# Patient Record
Sex: MALE | Race: WHITE | NOT HISPANIC OR LATINO
[De-identification: names, ages, dates, MRNs, and addresses within clinical notes are randomized per-mention and may not be internally consistent; named-entity substitution may affect disease eponyms.]

---

## 2017-08-18 ENCOUNTER — RX RENEWAL (OUTPATIENT)
Age: 47
End: 2017-08-18

## 2017-09-02 ENCOUNTER — EMERGENCY (EMERGENCY)
Facility: HOSPITAL | Age: 47
LOS: 1 days | Discharge: HOME | End: 2017-09-02
Attending: EMERGENCY MEDICINE | Admitting: EMERGENCY MEDICINE
Payer: MEDICARE

## 2017-09-02 VITALS
HEART RATE: 70 BPM | WEIGHT: 205.03 LBS | DIASTOLIC BLOOD PRESSURE: 99 MMHG | RESPIRATION RATE: 16 BRPM | TEMPERATURE: 99 F | SYSTOLIC BLOOD PRESSURE: 162 MMHG | HEIGHT: 70 IN | OXYGEN SATURATION: 98 %

## 2017-09-02 VITALS
TEMPERATURE: 98 F | OXYGEN SATURATION: 98 % | RESPIRATION RATE: 16 BRPM | SYSTOLIC BLOOD PRESSURE: 162 MMHG | HEART RATE: 62 BPM | DIASTOLIC BLOOD PRESSURE: 97 MMHG

## 2017-09-02 DIAGNOSIS — Z98.1 ARTHRODESIS STATUS: Chronic | ICD-10-CM

## 2017-09-02 LAB
ALBUMIN SERPL ELPH-MCNC: 4 G/DL — SIGNIFICANT CHANGE UP (ref 3.3–5)
ALP SERPL-CCNC: 87 U/L — SIGNIFICANT CHANGE UP (ref 30–120)
ALT FLD-CCNC: 43 U/L DA — SIGNIFICANT CHANGE UP (ref 10–60)
AMPHET UR-MCNC: NEGATIVE — SIGNIFICANT CHANGE UP
ANION GAP SERPL CALC-SCNC: 12 MMOL/L — SIGNIFICANT CHANGE UP (ref 5–17)
APPEARANCE UR: CLEAR — SIGNIFICANT CHANGE UP
AST SERPL-CCNC: 41 U/L — HIGH (ref 10–40)
BACTERIA # UR AUTO: NEGATIVE — SIGNIFICANT CHANGE UP
BARBITURATES UR SCN-MCNC: NEGATIVE — SIGNIFICANT CHANGE UP
BASOPHILS # BLD AUTO: 0.2 K/UL — SIGNIFICANT CHANGE UP (ref 0–0.2)
BASOPHILS NFR BLD AUTO: 1.6 % — SIGNIFICANT CHANGE UP (ref 0–2)
BENZODIAZ UR-MCNC: NEGATIVE — SIGNIFICANT CHANGE UP
BILIRUB SERPL-MCNC: 1.2 MG/DL — SIGNIFICANT CHANGE UP (ref 0.2–1.2)
BILIRUB UR-MCNC: NEGATIVE — SIGNIFICANT CHANGE UP
BUN SERPL-MCNC: 16 MG/DL — SIGNIFICANT CHANGE UP (ref 7–23)
CALCIUM SERPL-MCNC: 9.1 MG/DL — SIGNIFICANT CHANGE UP (ref 8.4–10.5)
CHLORIDE SERPL-SCNC: 107 MMOL/L — SIGNIFICANT CHANGE UP (ref 96–108)
CK MB CFR SERPL CALC: 4.7 NG/ML — HIGH (ref 0–3.6)
CO2 SERPL-SCNC: 24 MMOL/L — SIGNIFICANT CHANGE UP (ref 22–31)
COCAINE METAB.OTHER UR-MCNC: NEGATIVE — SIGNIFICANT CHANGE UP
COLOR SPEC: YELLOW — SIGNIFICANT CHANGE UP
CREAT SERPL-MCNC: 0.94 MG/DL — SIGNIFICANT CHANGE UP (ref 0.5–1.3)
DIFF PNL FLD: ABNORMAL
EOSINOPHIL # BLD AUTO: 0.1 K/UL — SIGNIFICANT CHANGE UP (ref 0–0.5)
EOSINOPHIL NFR BLD AUTO: 0.9 % — SIGNIFICANT CHANGE UP (ref 0–6)
EPI CELLS # UR: SIGNIFICANT CHANGE UP
GLUCOSE SERPL-MCNC: 79 MG/DL — SIGNIFICANT CHANGE UP (ref 70–99)
GLUCOSE UR QL: NEGATIVE MG/DL — SIGNIFICANT CHANGE UP
HCT VFR BLD CALC: 45.6 % — SIGNIFICANT CHANGE UP (ref 39–50)
HGB BLD-MCNC: 14.4 G/DL — SIGNIFICANT CHANGE UP (ref 13–17)
KETONES UR-MCNC: ABNORMAL
LEUKOCYTE ESTERASE UR-ACNC: NEGATIVE — SIGNIFICANT CHANGE UP
LYMPHOCYTES # BLD AUTO: 2.1 K/UL — SIGNIFICANT CHANGE UP (ref 1–3.3)
LYMPHOCYTES # BLD AUTO: 22 % — SIGNIFICANT CHANGE UP (ref 13–44)
MCHC RBC-ENTMCNC: 28.9 PG — SIGNIFICANT CHANGE UP (ref 27–34)
MCHC RBC-ENTMCNC: 31.6 GM/DL — LOW (ref 32–36)
MCV RBC AUTO: 91.3 FL — SIGNIFICANT CHANGE UP (ref 80–100)
METHADONE UR-MCNC: NEGATIVE — SIGNIFICANT CHANGE UP
MONOCYTES # BLD AUTO: 0.8 K/UL — SIGNIFICANT CHANGE UP (ref 0–0.9)
MONOCYTES NFR BLD AUTO: 8.3 % — SIGNIFICANT CHANGE UP (ref 2–14)
NEUTROPHILS # BLD AUTO: 6.3 K/UL — SIGNIFICANT CHANGE UP (ref 1.8–7.4)
NEUTROPHILS NFR BLD AUTO: 67.1 % — SIGNIFICANT CHANGE UP (ref 43–77)
NITRITE UR-MCNC: NEGATIVE — SIGNIFICANT CHANGE UP
OPIATES UR-MCNC: NEGATIVE — SIGNIFICANT CHANGE UP
PCP SPEC-MCNC: SIGNIFICANT CHANGE UP
PCP UR-MCNC: NEGATIVE — SIGNIFICANT CHANGE UP
PH UR: 5 — SIGNIFICANT CHANGE UP (ref 5–8)
PLATELET # BLD AUTO: 268 K/UL — SIGNIFICANT CHANGE UP (ref 150–400)
POTASSIUM SERPL-MCNC: 3.7 MMOL/L — SIGNIFICANT CHANGE UP (ref 3.5–5.3)
POTASSIUM SERPL-SCNC: 3.7 MMOL/L — SIGNIFICANT CHANGE UP (ref 3.5–5.3)
PROT SERPL-MCNC: 7.4 G/DL — SIGNIFICANT CHANGE UP (ref 6–8.3)
PROT UR-MCNC: 15 MG/DL
RBC # BLD: 5 M/UL — SIGNIFICANT CHANGE UP (ref 4.2–5.8)
RBC # FLD: 12 % — SIGNIFICANT CHANGE UP (ref 10.3–14.5)
RBC CASTS # UR COMP ASSIST: SIGNIFICANT CHANGE UP /HPF (ref 0–4)
SODIUM SERPL-SCNC: 143 MMOL/L — SIGNIFICANT CHANGE UP (ref 135–145)
SP GR SPEC: 1.02 — SIGNIFICANT CHANGE UP (ref 1.01–1.02)
THC UR QL: POSITIVE
TROPONIN I SERPL-MCNC: 0 NG/ML — LOW (ref 0.02–0.06)
UROBILINOGEN FLD QL: NEGATIVE MG/DL — SIGNIFICANT CHANGE UP
WBC # BLD: 9.3 K/UL — SIGNIFICANT CHANGE UP (ref 3.8–10.5)
WBC # FLD AUTO: 9.3 K/UL — SIGNIFICANT CHANGE UP (ref 3.8–10.5)
WBC UR QL: SIGNIFICANT CHANGE UP

## 2017-09-02 PROCEDURE — 80053 COMPREHEN METABOLIC PANEL: CPT

## 2017-09-02 PROCEDURE — 99284 EMERGENCY DEPT VISIT MOD MDM: CPT | Mod: 25

## 2017-09-02 PROCEDURE — 84484 ASSAY OF TROPONIN QUANT: CPT

## 2017-09-02 PROCEDURE — 70450 CT HEAD/BRAIN W/O DYE: CPT

## 2017-09-02 PROCEDURE — 82553 CREATINE MB FRACTION: CPT

## 2017-09-02 PROCEDURE — 70450 CT HEAD/BRAIN W/O DYE: CPT | Mod: 26

## 2017-09-02 PROCEDURE — 71010: CPT | Mod: 26

## 2017-09-02 PROCEDURE — 80307 DRUG TEST PRSMV CHEM ANLYZR: CPT

## 2017-09-02 PROCEDURE — 71045 X-RAY EXAM CHEST 1 VIEW: CPT

## 2017-09-02 PROCEDURE — 99285 EMERGENCY DEPT VISIT HI MDM: CPT

## 2017-09-02 PROCEDURE — 85027 COMPLETE CBC AUTOMATED: CPT

## 2017-09-02 PROCEDURE — 93005 ELECTROCARDIOGRAM TRACING: CPT

## 2017-09-02 PROCEDURE — 81001 URINALYSIS AUTO W/SCOPE: CPT

## 2017-09-02 PROCEDURE — 93010 ELECTROCARDIOGRAM REPORT: CPT

## 2017-09-02 RX ORDER — PANTOPRAZOLE SODIUM 20 MG/1
40 TABLET, DELAYED RELEASE ORAL
Qty: 0 | Refills: 0 | Status: DISCONTINUED | OUTPATIENT
Start: 2017-09-02 | End: 2017-09-06

## 2017-09-02 RX ORDER — SODIUM CHLORIDE 9 MG/ML
3 INJECTION INTRAMUSCULAR; INTRAVENOUS; SUBCUTANEOUS ONCE
Qty: 0 | Refills: 0 | Status: COMPLETED | OUTPATIENT
Start: 2017-09-02 | End: 2017-09-02

## 2017-09-02 RX ADMIN — Medication 30 MILLILITER(S): at 20:03

## 2017-09-02 RX ADMIN — PANTOPRAZOLE SODIUM 40 MILLIGRAM(S): 20 TABLET, DELAYED RELEASE ORAL at 20:03

## 2017-09-02 RX ADMIN — Medication 0.2 MILLIGRAM(S): at 18:01

## 2017-09-02 RX ADMIN — SODIUM CHLORIDE 3 MILLILITER(S): 9 INJECTION INTRAMUSCULAR; INTRAVENOUS; SUBCUTANEOUS at 18:00

## 2017-09-02 NOTE — ED PROVIDER NOTE - PMH
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

## 2017-09-02 NOTE — ED ADULT TRIAGE NOTE - CHIEF COMPLAINT QUOTE
46 yr. old male with fever x 6 days.  Today c/o SOB, and dizziness. 46 yr. old male with fever x 6 days.  Today c/o difficulty breathing, nausea, back pain and dizziness.

## 2017-09-02 NOTE — ED PROVIDER NOTE - OBJECTIVE STATEMENT
47 yo male with hx of HTN on no meds, co 6 days of feeling sick, headache, unsteady, feverish. Yesterday started taking cold medicine thinking he was getting sick with a cold. Today feels like BP is high. Has BP med at home but doesn't take it. Denies CP, SOB, N, V, abd pain or other symptom. 47 yo male with hx of HTN on no meds, co 6 days of feeling sick, headache, unsteady, feverish. Yesterday started taking cold medicine thinking he was getting sick with a cold. Admits to taking falguni seltzer cold and tylenol cold at the same time. Today feels like BP is high and feels anxious. Has BP med at home but doesn't take it. Denies CP, SOB, N, V, abd pain or other symptom.

## 2017-09-02 NOTE — ED ADULT NURSE NOTE - OBJECTIVE STATEMENT
Pt presents with complaint of nausea and vomiting with sharp  constant abdominal pain for 6 days. States chronic back problem is increased as well, No vomiting noted. Extremely anxious. Rapid speech.poor concentration. Abdomen soft Bilateral upper quadrant abdominal tenderness ed. Positive bowel sounds noted

## 2017-09-02 NOTE — ED PROVIDER NOTE - PROGRESS NOTE DETAILS
BP improved. Feels somewhat better. Results of all tests discussed with pt and family. Will avoid decongestants and FU with his PMD on tuesday for repeat BP check.

## 2017-09-02 NOTE — ED ADULT NURSE NOTE - PMH
Ankle Fracture  left  Herniated Disc  with nerve damage  Obesity    Old Disrupted ACL (Anterior Cruciate Ligament)  left Ankle Fracture  left  Herniated Disc  with nerve damage  Hypothyroid    Obesity    Old Disrupted ACL (Anterior Cruciate Ligament)  left

## 2017-09-02 NOTE — ED ADULT NURSE NOTE - CHPI ED SYMPTOMS NEG
no chills/no blood in stool/no fever/no burning urination/no vomiting/no diarrhea/no dysuria/no abdominal distension/no hematuria

## 2017-09-02 NOTE — ED PROVIDER NOTE - MEDICAL DECISION MAKING DETAILS
47 yo male with vague symptoms for 6 days has had htn but doesn't take meds, taking cold medicine since yesterday now with elevated BP. PE otherwise normal. Plan - BP med, labs, ekg, ct brain. Reassess.

## 2017-09-02 NOTE — ED ADULT NURSE NOTE - CHIEF COMPLAINT QUOTE
46 yr. old male with fever x 6 days.  Today c/o difficulty breathing, nausea, back pain and dizziness.

## 2017-09-05 ENCOUNTER — APPOINTMENT (OUTPATIENT)
Dept: FAMILY MEDICINE | Facility: CLINIC | Age: 47
End: 2017-09-05
Payer: MEDICARE

## 2017-09-05 VITALS
BODY MASS INDEX: 29.35 KG/M2 | OXYGEN SATURATION: 98 % | RESPIRATION RATE: 15 BRPM | WEIGHT: 205 LBS | HEIGHT: 70 IN | SYSTOLIC BLOOD PRESSURE: 150 MMHG | HEART RATE: 74 BPM | DIASTOLIC BLOOD PRESSURE: 100 MMHG | TEMPERATURE: 98.4 F

## 2017-09-05 PROCEDURE — 99214 OFFICE O/P EST MOD 30 MIN: CPT

## 2017-10-10 ENCOUNTER — RX RENEWAL (OUTPATIENT)
Age: 47
End: 2017-10-10

## 2018-02-20 ENCOUNTER — RX RENEWAL (OUTPATIENT)
Age: 48
End: 2018-02-20

## 2018-03-26 ENCOUNTER — RX RENEWAL (OUTPATIENT)
Age: 48
End: 2018-03-26

## 2018-04-25 ENCOUNTER — RX RENEWAL (OUTPATIENT)
Age: 48
End: 2018-04-25

## 2018-05-25 ENCOUNTER — RX RENEWAL (OUTPATIENT)
Age: 48
End: 2018-05-25

## 2018-06-26 ENCOUNTER — RX RENEWAL (OUTPATIENT)
Age: 48
End: 2018-06-26

## 2018-07-30 ENCOUNTER — RX RENEWAL (OUTPATIENT)
Age: 48
End: 2018-07-30

## 2018-08-27 ENCOUNTER — RX RENEWAL (OUTPATIENT)
Age: 48
End: 2018-08-27

## 2018-09-03 ENCOUNTER — FORM ENCOUNTER (OUTPATIENT)
Age: 48
End: 2018-09-03

## 2018-09-04 ENCOUNTER — APPOINTMENT (OUTPATIENT)
Age: 48
End: 2018-09-04
Payer: MEDICARE

## 2018-09-04 ENCOUNTER — OUTPATIENT (OUTPATIENT)
Dept: OUTPATIENT SERVICES | Facility: HOSPITAL | Age: 48
LOS: 1 days | End: 2018-09-04
Payer: MEDICARE

## 2018-09-04 ENCOUNTER — APPOINTMENT (OUTPATIENT)
Dept: RADIOLOGY | Facility: HOSPITAL | Age: 48
End: 2018-09-04
Payer: MEDICARE

## 2018-09-04 VITALS
TEMPERATURE: 98.2 F | OXYGEN SATURATION: 98 % | HEART RATE: 61 BPM | BODY MASS INDEX: 31.21 KG/M2 | WEIGHT: 218 LBS | HEIGHT: 70 IN | DIASTOLIC BLOOD PRESSURE: 94 MMHG | SYSTOLIC BLOOD PRESSURE: 152 MMHG

## 2018-09-04 DIAGNOSIS — Z98.1 ARTHRODESIS STATUS: Chronic | ICD-10-CM

## 2018-09-04 DIAGNOSIS — R53.82 CHRONIC FATIGUE, UNSPECIFIED: ICD-10-CM

## 2018-09-04 PROBLEM — E03.9 HYPOTHYROIDISM, UNSPECIFIED: Chronic | Status: ACTIVE | Noted: 2017-09-02

## 2018-09-04 PROCEDURE — 93000 ELECTROCARDIOGRAM COMPLETE: CPT | Mod: 59

## 2018-09-04 PROCEDURE — 99214 OFFICE O/P EST MOD 30 MIN: CPT | Mod: 25

## 2018-09-04 PROCEDURE — 71046 X-RAY EXAM CHEST 2 VIEWS: CPT

## 2018-09-04 PROCEDURE — 71046 X-RAY EXAM CHEST 2 VIEWS: CPT | Mod: 26

## 2018-09-04 NOTE — PLAN
[FreeTextEntry1] : labs ordered. \par EKG sinus bradycardia.\par cardiology referral.\par daily weight, avoid salt. \par monitor BP.\par meds refilled.\par f/u within a month

## 2018-09-04 NOTE — HISTORY OF PRESENT ILLNESS
[FreeTextEntry8] :  C/o weight gain and feet swelling. He ran out of his meds-synthroid, metoprolol which he did not take. no chest pain , sob , fever, chills. He had right shoulder arthroscopic surgery 7 weeks ago. c/o right shoulder pain . He is taking advil  for pain 4 pills every 8 hours. He gained 15 pound in 3 week. c/o fatigue and sob for 5 weeks. denies chest pain , nausea, vomiting.

## 2018-09-04 NOTE — COUNSELING
[Healthy eating counseling provided] : healthy eating [Activity counseling provided] : activity [Behavioral health counseling provided] : behavioral health  [Walking] : Walking [Engage in a relaxing activity] : Engage in a relaxing activity [None] : None [Needs reinforcement, provided] : Patient needs reinforcement on understanding lifestyle changes and  the steps needed to achieve self management goals and reinforcement was provided

## 2018-09-04 NOTE — HEALTH RISK ASSESSMENT
[No falls in past year] : Patient reported no falls in the past year [0] : 2) Feeling down, depressed, or hopeless: Not at all (0) [] : No [de-identified] : orthopedic  [[id number]] : 0

## 2018-09-07 LAB
25(OH)D3 SERPL-MCNC: 26.4 NG/ML
ALBUMIN SERPL ELPH-MCNC: 4.2 G/DL
ALP BLD-CCNC: 77 U/L
ALT SERPL-CCNC: 22 U/L
ANION GAP SERPL CALC-SCNC: 12 MMOL/L
APPEARANCE: CLEAR
AST SERPL-CCNC: 25 U/L
BACTERIA: NEGATIVE
BASOPHILS # BLD AUTO: 0.04 K/UL
BASOPHILS NFR BLD AUTO: 0.5 %
BILIRUB SERPL-MCNC: 0.5 MG/DL
BILIRUBIN URINE: NEGATIVE
BLOOD URINE: NEGATIVE
BUN SERPL-MCNC: 16 MG/DL
CALCIUM SERPL-MCNC: 9.3 MG/DL
CHLORIDE SERPL-SCNC: 108 MMOL/L
CHOLEST SERPL-MCNC: 164 MG/DL
CHOLEST/HDLC SERPL: 2.8 RATIO
CO2 SERPL-SCNC: 26 MMOL/L
COLOR: YELLOW
CREAT SERPL-MCNC: 0.73 MG/DL
CREAT SPEC-SCNC: 282 MG/DL
EOSINOPHIL # BLD AUTO: 0.14 K/UL
EOSINOPHIL NFR BLD AUTO: 1.9 %
GLUCOSE QUALITATIVE U: NEGATIVE MG/DL
GLUCOSE SERPL-MCNC: 90 MG/DL
HBA1C MFR BLD HPLC: 5.5 %
HCT VFR BLD CALC: 42.6 %
HCV AB SER QL: NONREACTIVE
HCV S/CO RATIO: 0.18 S/CO
HDLC SERPL-MCNC: 59 MG/DL
HGB BLD-MCNC: 13.8 G/DL
HIV1+2 AB SPEC QL IA.RAPID: NONREACTIVE
HYALINE CASTS: 1 /LPF
IMM GRANULOCYTES NFR BLD AUTO: 0.3 %
KETONES URINE: NEGATIVE
LDLC SERPL CALC-MCNC: 88 MG/DL
LEUKOCYTE ESTERASE URINE: NEGATIVE
LYMPHOCYTES # BLD AUTO: 2.1 K/UL
LYMPHOCYTES NFR BLD AUTO: 28.5 %
MAN DIFF?: NORMAL
MCHC RBC-ENTMCNC: 29.9 PG
MCHC RBC-ENTMCNC: 32.4 GM/DL
MCV RBC AUTO: 92.4 FL
MICROALBUMIN 24H UR DL<=1MG/L-MCNC: 1.4 MG/DL
MICROALBUMIN/CREAT 24H UR-RTO: 5 MG/G
MICROSCOPIC-UA: NORMAL
MONOCYTES # BLD AUTO: 0.58 K/UL
MONOCYTES NFR BLD AUTO: 7.9 %
NEUTROPHILS # BLD AUTO: 4.48 K/UL
NEUTROPHILS NFR BLD AUTO: 60.9 %
NITRITE URINE: NEGATIVE
NT-PROBNP SERPL-MCNC: 146 PG/ML
PH URINE: 6
PLATELET # BLD AUTO: 244 K/UL
POTASSIUM SERPL-SCNC: 5.1 MMOL/L
PROT SERPL-MCNC: 6.5 G/DL
PROTEIN URINE: NEGATIVE MG/DL
RBC # BLD: 4.61 M/UL
RBC # FLD: 13.8 %
RED BLOOD CELLS URINE: 4 /HPF
SODIUM SERPL-SCNC: 146 MMOL/L
SPECIFIC GRAVITY URINE: 1.03
SQUAMOUS EPITHELIAL CELLS: 1 /HPF
T4 FREE SERPL-MCNC: 1.6 NG/DL
TRIGL SERPL-MCNC: 86 MG/DL
TSH SERPL-ACNC: 1.39 UIU/ML
URATE SERPL-MCNC: 6 MG/DL
UROBILINOGEN URINE: NEGATIVE MG/DL
WBC # FLD AUTO: 7.36 K/UL
WHITE BLOOD CELLS URINE: 1 /HPF

## 2018-09-10 ENCOUNTER — MEDICATION RENEWAL (OUTPATIENT)
Age: 48
End: 2018-09-10

## 2018-11-14 ENCOUNTER — EMERGENCY (EMERGENCY)
Facility: HOSPITAL | Age: 48
LOS: 1 days | Discharge: HOME | End: 2018-11-14
Attending: EMERGENCY MEDICINE | Admitting: EMERGENCY MEDICINE
Payer: MEDICARE

## 2018-11-14 ENCOUNTER — APPOINTMENT (OUTPATIENT)
Dept: FAMILY MEDICINE | Facility: CLINIC | Age: 48
End: 2018-11-14
Payer: MEDICARE

## 2018-11-14 VITALS
HEART RATE: 68 BPM | RESPIRATION RATE: 14 BRPM | SYSTOLIC BLOOD PRESSURE: 126 MMHG | OXYGEN SATURATION: 98 % | DIASTOLIC BLOOD PRESSURE: 83 MMHG | TEMPERATURE: 98 F

## 2018-11-14 VITALS
BODY MASS INDEX: 31.57 KG/M2 | OXYGEN SATURATION: 98 % | WEIGHT: 220 LBS | RESPIRATION RATE: 15 BRPM | HEART RATE: 74 BPM | TEMPERATURE: 98 F

## 2018-11-14 VITALS
TEMPERATURE: 98.2 F | OXYGEN SATURATION: 98 % | SYSTOLIC BLOOD PRESSURE: 118 MMHG | DIASTOLIC BLOOD PRESSURE: 78 MMHG | RESPIRATION RATE: 15 BRPM | HEART RATE: 70 BPM

## 2018-11-14 VITALS
RESPIRATION RATE: 16 BRPM | DIASTOLIC BLOOD PRESSURE: 93 MMHG | TEMPERATURE: 98 F | OXYGEN SATURATION: 97 % | HEART RATE: 70 BPM | HEIGHT: 70 IN | SYSTOLIC BLOOD PRESSURE: 154 MMHG | WEIGHT: 218.92 LBS

## 2018-11-14 DIAGNOSIS — Z98.1 ARTHRODESIS STATUS: Chronic | ICD-10-CM

## 2018-11-14 PROCEDURE — 99282 EMERGENCY DEPT VISIT SF MDM: CPT

## 2018-11-14 PROCEDURE — 99214 OFFICE O/P EST MOD 30 MIN: CPT

## 2018-11-14 PROCEDURE — 99283 EMERGENCY DEPT VISIT LOW MDM: CPT

## 2018-11-14 RX ORDER — QUETIAPINE FUMARATE 50 MG/1
50 TABLET ORAL
Qty: 90 | Refills: 0 | Status: DISCONTINUED | COMMUNITY
Start: 2018-04-11 | End: 2018-11-14

## 2018-11-14 RX ORDER — MELOXICAM 15 MG/1
15 TABLET ORAL
Qty: 40 | Refills: 0 | Status: DISCONTINUED | COMMUNITY
Start: 2018-08-24 | End: 2018-11-14

## 2018-11-14 RX ORDER — HYDROCHLOROTHIAZIDE 12.5 MG/1
12.5 TABLET ORAL
Qty: 10 | Refills: 0 | Status: DISCONTINUED | COMMUNITY
Start: 2018-09-04 | End: 2018-11-14

## 2018-11-14 RX ORDER — PANTOPRAZOLE 40 MG/1
40 TABLET, DELAYED RELEASE ORAL
Qty: 90 | Refills: 0 | Status: DISCONTINUED | COMMUNITY
Start: 2018-08-10 | End: 2018-11-14

## 2018-11-14 RX ORDER — ONDANSETRON 8 MG/1
1 TABLET, FILM COATED ORAL
Qty: 10 | Refills: 0 | OUTPATIENT
Start: 2018-11-14

## 2018-11-14 RX ORDER — ONDANSETRON 8 MG/1
4 TABLET, FILM COATED ORAL ONCE
Qty: 0 | Refills: 0 | Status: DISCONTINUED | OUTPATIENT
Start: 2018-11-14 | End: 2018-11-14

## 2018-11-14 RX ORDER — ONDANSETRON 8 MG/1
4 TABLET, FILM COATED ORAL ONCE
Qty: 0 | Refills: 0 | Status: COMPLETED | OUTPATIENT
Start: 2018-11-14 | End: 2018-11-14

## 2018-11-14 RX ORDER — ALPRAZOLAM 0.25 MG
0.25 TABLET ORAL ONCE
Qty: 0 | Refills: 0 | Status: DISCONTINUED | OUTPATIENT
Start: 2018-11-14 | End: 2018-11-14

## 2018-11-14 RX ADMIN — ONDANSETRON 4 MILLIGRAM(S): 8 TABLET, FILM COATED ORAL at 21:21

## 2018-11-14 RX ADMIN — Medication 0.25 MILLIGRAM(S): at 21:17

## 2018-11-14 NOTE — ED PROVIDER NOTE - ENMT, MLM
Airway patent, large deep ulceration on the septal surface of R nare. No active bleeding. Post uvulectomy.

## 2018-11-14 NOTE — ED PROVIDER NOTE - OBJECTIVE STATEMENT
49 y/o M pt w/ PMHx hypothyroid, obesity presents to the ED c/o spontaneous epistaxis x 3-4 months. Pt reports that he had R shoulder surgery 5 months ago. Since the surgery he has been eating more than usual and noted spontaneous bleeding from his nose. States that he could be talking to someone and suddenly blood is running down his face. Also states that 3 weeks ago started with nausea but admits to having 20 episodes of vomiting since the surgery. Reports that he had an endoscopy performed 6-7 months ago with revealed irritated stomach lining, denies ulcer. Reports he saw his PMD recently, was Rxd a z-pack but when he had another episode of bleeding today he decided to come to ED for further evaluation. Pt denies dizziness, abd pain, diarrhea or any other complaints at this time. 47 y/o M pt w/ PMHx hypothyroid, obesity and PSHx R shoulder, uvulectomy presents to the ED c/o spontaneous epistaxis x 3-4 months. Pt reports that he had R shoulder surgery 5 months ago. Since the surgery he has been eating more than usual and noted spontaneous bleeding from his nose. States that he could be talking to someone and suddenly blood is running down his face. Also states that 3 weeks ago started with nausea and admits to having 20 episodes of vomiting since the surgery. Reports that he had an endoscopy performed 6-7 months ago with revealed irritated stomach lining, denies ulcer. Reports he saw his PMD recently, was Rxd a z-pack but when he had another episode of bleeding today he decided to come to ED for further evaluation. Pt denies dizziness, abd pain, diarrhea or any other complaints at this time.

## 2018-11-14 NOTE — ED ADULT NURSE NOTE - NSIMPLEMENTINTERV_GEN_ALL_ED
Implemented All Universal Safety Interventions:  [place] to call system. Call bell, personal items and telephone within reach. Instruct patient to call for assistance. Room bathroom lighting operational. Non-slip footwear when patient is off stretcher. Physically safe environment: no spills, clutter or unnecessary equipment. Stretcher in lowest position, wheels locked, appropriate side rails in place.

## 2018-11-14 NOTE — ED PROVIDER NOTE - NS_ ATTENDINGSCRIBEDETAILS _ED_A_ED_FT
Shemar Redman MD - The scribe's documentation has been prepared under my direction and personally reviewed by me in its entirety. I confirm that the note above accurately reflects all work, treatment, procedures, and medical decision making performed by me.

## 2018-12-11 ENCOUNTER — RX RENEWAL (OUTPATIENT)
Age: 48
End: 2018-12-11

## 2019-01-03 ENCOUNTER — RX RENEWAL (OUTPATIENT)
Age: 49
End: 2019-01-03

## 2019-01-10 ENCOUNTER — RX RENEWAL (OUTPATIENT)
Age: 49
End: 2019-01-10

## 2019-01-14 ENCOUNTER — APPOINTMENT (OUTPATIENT)
Dept: FAMILY MEDICINE | Facility: CLINIC | Age: 49
End: 2019-01-14
Payer: MEDICARE

## 2019-01-14 VITALS
OXYGEN SATURATION: 99 % | SYSTOLIC BLOOD PRESSURE: 130 MMHG | TEMPERATURE: 97.7 F | WEIGHT: 226 LBS | HEIGHT: 70 IN | HEART RATE: 59 BPM | DIASTOLIC BLOOD PRESSURE: 78 MMHG | BODY MASS INDEX: 32.35 KG/M2

## 2019-01-14 PROCEDURE — 99214 OFFICE O/P EST MOD 30 MIN: CPT

## 2019-01-16 NOTE — PHYSICAL EXAM

## 2019-01-16 NOTE — REVIEW OF SYSTEMS
[Back Pain] : back pain [Negative] : Heme/Lymph [FreeTextEntry9] : right foot pain, right shoulder pain

## 2019-01-16 NOTE — HEALTH RISK ASSESSMENT
[Any fall with injury in past year] : Patient reported fall with injury in the past year [0] : 2) Feeling down, depressed, or hopeless: Not at all (0) [] : No [[id number]] : 0
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
## 2019-01-21 ENCOUNTER — FORM ENCOUNTER (OUTPATIENT)
Age: 49
End: 2019-01-21

## 2019-01-22 ENCOUNTER — OUTPATIENT (OUTPATIENT)
Dept: OUTPATIENT SERVICES | Facility: HOSPITAL | Age: 49
LOS: 1 days | End: 2019-01-22
Payer: MEDICARE

## 2019-01-22 ENCOUNTER — APPOINTMENT (OUTPATIENT)
Dept: ULTRASOUND IMAGING | Facility: HOSPITAL | Age: 49
End: 2019-01-22
Payer: MEDICARE

## 2019-01-22 DIAGNOSIS — Z98.1 ARTHRODESIS STATUS: Chronic | ICD-10-CM

## 2019-01-22 DIAGNOSIS — Z00.8 ENCOUNTER FOR OTHER GENERAL EXAMINATION: ICD-10-CM

## 2019-01-22 PROCEDURE — 76775 US EXAM ABDO BACK WALL LIM: CPT | Mod: 26

## 2019-01-22 PROCEDURE — 76775 US EXAM ABDO BACK WALL LIM: CPT

## 2019-01-24 ENCOUNTER — OTHER (OUTPATIENT)
Age: 49
End: 2019-01-24

## 2019-01-25 ENCOUNTER — FORM ENCOUNTER (OUTPATIENT)
Age: 49
End: 2019-01-25

## 2019-01-26 ENCOUNTER — OUTPATIENT (OUTPATIENT)
Dept: OUTPATIENT SERVICES | Facility: HOSPITAL | Age: 49
LOS: 1 days | End: 2019-01-26
Payer: MEDICARE

## 2019-01-26 ENCOUNTER — APPOINTMENT (OUTPATIENT)
Dept: CT IMAGING | Facility: CLINIC | Age: 49
End: 2019-01-26
Payer: MEDICARE

## 2019-01-26 DIAGNOSIS — Z00.8 ENCOUNTER FOR OTHER GENERAL EXAMINATION: ICD-10-CM

## 2019-01-26 DIAGNOSIS — Z98.1 ARTHRODESIS STATUS: Chronic | ICD-10-CM

## 2019-01-26 PROCEDURE — 74178 CT ABD&PLV WO CNTR FLWD CNTR: CPT

## 2019-01-26 PROCEDURE — 74178 CT ABD&PLV WO CNTR FLWD CNTR: CPT | Mod: 26

## 2019-01-28 ENCOUNTER — APPOINTMENT (OUTPATIENT)
Dept: MRI IMAGING | Facility: CLINIC | Age: 49
End: 2019-01-28
Payer: MEDICARE

## 2019-01-28 ENCOUNTER — OUTPATIENT (OUTPATIENT)
Dept: OUTPATIENT SERVICES | Facility: HOSPITAL | Age: 49
LOS: 1 days | End: 2019-01-28
Payer: MEDICARE

## 2019-01-28 DIAGNOSIS — Z98.1 ARTHRODESIS STATUS: Chronic | ICD-10-CM

## 2019-01-28 DIAGNOSIS — Z00.8 ENCOUNTER FOR OTHER GENERAL EXAMINATION: ICD-10-CM

## 2019-01-28 PROCEDURE — 82565 ASSAY OF CREATININE: CPT

## 2019-01-28 PROCEDURE — 73718 MRI LOWER EXTREMITY W/O DYE: CPT | Mod: 26,RT

## 2019-01-28 PROCEDURE — 73718 MRI LOWER EXTREMITY W/O DYE: CPT

## 2019-02-20 ENCOUNTER — APPOINTMENT (OUTPATIENT)
Dept: UROLOGY | Facility: CLINIC | Age: 49
End: 2019-02-20
Payer: MEDICARE

## 2019-02-20 VITALS
HEIGHT: 70 IN | DIASTOLIC BLOOD PRESSURE: 83 MMHG | HEART RATE: 59 BPM | TEMPERATURE: 98.2 F | SYSTOLIC BLOOD PRESSURE: 131 MMHG | WEIGHT: 220 LBS | BODY MASS INDEX: 31.5 KG/M2 | RESPIRATION RATE: 17 BRPM

## 2019-02-20 PROCEDURE — 99203 OFFICE O/P NEW LOW 30 MIN: CPT

## 2019-02-20 NOTE — ADDENDUM
[FreeTextEntry1] :  I, Paz Chang, acted solely as a scribe for Dr. Joe Parry. The documentation recorded by the scribe accurately reflects the service I personally performed and the decision by me.\par
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
## 2019-02-20 NOTE — REVIEW OF SYSTEMS
[Feeling Tired] : feeling tired [Recent Weight Gain (___ Lbs)] : recent [unfilled] ~Ulb weight gain [Earache] : earache [Sore Throat] : sore throat [Abdominal Pain] : abdominal pain [Heartburn] : heartburn [see HPI] : see HPI [Loss of interest] : loss of interest in sexual activity [Joint Pain] : joint pain [Limb Swelling] : limb swelling [Anxiety] : anxiety [Negative] : Heme/Lymph
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
## 2019-02-21 ENCOUNTER — APPOINTMENT (OUTPATIENT)
Dept: MRI IMAGING | Facility: CLINIC | Age: 49
End: 2019-02-21

## 2019-02-25 ENCOUNTER — APPOINTMENT (OUTPATIENT)
Dept: UROLOGY | Facility: CLINIC | Age: 49
End: 2019-02-25

## 2019-04-01 ENCOUNTER — FORM ENCOUNTER (OUTPATIENT)
Age: 49
End: 2019-04-01

## 2019-04-02 ENCOUNTER — APPOINTMENT (OUTPATIENT)
Dept: MRI IMAGING | Facility: HOSPITAL | Age: 49
End: 2019-04-02
Payer: MEDICARE

## 2019-04-02 ENCOUNTER — OUTPATIENT (OUTPATIENT)
Dept: OUTPATIENT SERVICES | Facility: HOSPITAL | Age: 49
LOS: 1 days | End: 2019-04-02
Payer: MEDICARE

## 2019-04-02 DIAGNOSIS — N28.89 OTHER SPECIFIED DISORDERS OF KIDNEY AND URETER: ICD-10-CM

## 2019-04-02 DIAGNOSIS — Z98.1 ARTHRODESIS STATUS: Chronic | ICD-10-CM

## 2019-04-02 PROCEDURE — 74183 MRI ABD W/O CNTR FLWD CNTR: CPT | Mod: 26

## 2019-04-02 PROCEDURE — 72197 MRI PELVIS W/O & W/DYE: CPT | Mod: 26

## 2019-04-02 PROCEDURE — A9579: CPT

## 2019-04-02 PROCEDURE — 72197 MRI PELVIS W/O & W/DYE: CPT

## 2019-04-02 PROCEDURE — 74183 MRI ABD W/O CNTR FLWD CNTR: CPT

## 2019-04-11 ENCOUNTER — APPOINTMENT (OUTPATIENT)
Age: 49
End: 2019-04-11
Payer: MEDICARE

## 2019-04-11 VITALS
TEMPERATURE: 98.4 F | OXYGEN SATURATION: 96 % | SYSTOLIC BLOOD PRESSURE: 132 MMHG | WEIGHT: 226 LBS | DIASTOLIC BLOOD PRESSURE: 82 MMHG | HEART RATE: 66 BPM | BODY MASS INDEX: 33.47 KG/M2 | HEIGHT: 69 IN

## 2019-04-11 PROCEDURE — 99214 OFFICE O/P EST MOD 30 MIN: CPT

## 2019-04-11 RX ORDER — ALPRAZOLAM 1 MG/1
1 TABLET ORAL
Qty: 30 | Refills: 0 | Status: DISCONTINUED | COMMUNITY
Start: 2018-07-02 | End: 2019-04-11

## 2019-04-11 RX ORDER — CHOLECALCIFEROL (VITAMIN D3) 1250 MCG
1.25 MG CAPSULE ORAL
Qty: 12 | Refills: 0 | Status: DISCONTINUED | COMMUNITY
Start: 2018-09-10 | End: 2019-04-11

## 2019-04-11 RX ORDER — CALCIUM POLYCARBOPHIL 625 MG
625 TABLET ORAL DAILY
Qty: 30 | Refills: 0 | Status: DISCONTINUED | COMMUNITY
Start: 2018-11-14 | End: 2019-04-11

## 2019-04-11 RX ORDER — AZITHROMYCIN 250 MG/1
250 TABLET, FILM COATED ORAL
Qty: 1 | Refills: 0 | Status: DISCONTINUED | COMMUNITY
Start: 2018-11-14 | End: 2019-04-11

## 2019-04-11 NOTE — HISTORY OF PRESENT ILLNESS
[Moderate] : moderate [___ Days ago] : [unfilled] days ago [Paroxysmal] : paroxysmal [Congestion] : congestion [Cough] : cough [Sore Throat] : sore throat [Wheezing] : wheezing [Anorexia] : anorexia [Earache] : earache [Fatigue] : fatigue [Headache] : headache [OTC Remedies] : OTC remedies [At Night] : at night [In Morning] : in the morning [Worsening] : worsening [Chills] : no chills [Shortness Of Breath] : no shortness of breath [Fever] : no fever [FreeTextEntry2] : voice loss [FreeTextEntry5] : ibuprofen [FreeTextEntry8] : No sick contacts, no recent travel.

## 2019-04-11 NOTE — HEALTH RISK ASSESSMENT
[No falls in past year] : Patient reported no falls in the past year [0] : 2) Feeling down, depressed, or hopeless: Not at all (0) [[id number]] : 0 [] : No

## 2019-04-11 NOTE — PHYSICAL EXAM
[No Acute Distress] : no acute distress [Well Developed] : well developed [Normal Sclera/Conjunctiva] : normal sclera/conjunctiva [Well-Appearing] : well-appearing [PERRL] : pupils equal round and reactive to light [Normal Outer Ear/Nose] : the outer ears and nose were normal in appearance [EOMI] : extraocular movements intact [Supple] : supple [No JVD] : no jugular venous distention [Thyroid Normal, No Nodules] : the thyroid was normal and there were no nodules present [No Lymphadenopathy] : no lymphadenopathy [No Respiratory Distress] : no respiratory distress  [Clear to Auscultation] : lungs were clear to auscultation bilaterally [Normal Rate] : normal rate  [No Accessory Muscle Use] : no accessory muscle use [Regular Rhythm] : with a regular rhythm [No Murmur] : no murmur heard [Normal S1, S2] : normal S1 and S2 [No Carotid Bruits] : no carotid bruits [No Abdominal Bruit] : a ~M bruit was not heard ~T in the abdomen [Pedal Pulses Present] : the pedal pulses are present [No Varicosities] : no varicosities [No Extremity Clubbing/Cyanosis] : no extremity clubbing/cyanosis [No Edema] : there was no peripheral edema [No Palpable Aorta] : no palpable aorta [Normal Posterior Cervical Nodes] : no posterior cervical lymphadenopathy [No CVA Tenderness] : no CVA  tenderness [Normal Anterior Cervical Nodes] : no anterior cervical lymphadenopathy [No Spinal Tenderness] : no spinal tenderness [No Rash] : no rash [Normal Affect] : the affect was normal [de-identified] : throat mild redness, nasal congestion  [Normal Insight/Judgement] : insight and judgment were intact

## 2019-04-11 NOTE — COUNSELING
[Healthy eating counseling provided] : healthy eating [Activity counseling provided] : activity [None] : None [Walking] : Walking [Needs reinforcement, provided] : Patient needs reinforcement on understanding lifestyle changes and  the steps needed to achieve self management goals and reinforcement was provided

## 2019-04-11 NOTE — HISTORY OF PRESENT ILLNESS
[Cold Symptoms] : cold symptoms [Moderate] : moderate [___ Weeks ago] :  [unfilled] weeks ago [Gradual] : gradually [Paroxysmal] : paroxysmal [Congestion] : congestion [Fatigue] : fatigue [Rest] : rest [At Night] : at night [In Morning] : in the morning [Worsening] : worsening [Wheezing] : no wheezing [Cough] : no cough [Chills] : no chills [Anorexia] : no anorexia [Shortness Of Breath] : no shortness of breath [Earache] : no earache [Headache] : no headache [Fever] : no fever [FreeTextEntry2] : body aches,nasal mucus with blood [FreeTextEntry8] : sick contacts around him.

## 2019-04-11 NOTE — HEALTH RISK ASSESSMENT
[No falls in past year] : Patient reported no falls in the past year [0] : 2) Feeling down, depressed, or hopeless: Not at all (0) [] : No [[id number]] : 0

## 2019-04-11 NOTE — REVIEW OF SYSTEMS
[Fatigue] : fatigue [Nasal Discharge] : nasal discharge [Cough] : cough [Negative] : Psychiatric [Fever] : no fever [Chills] : no chills [Earache] : no earache [Hearing Loss] : no hearing loss [Nosebleeds] : no nosebleeds [Postnasal Drip] : no postnasal drip [Sore Throat] : no sore throat [Hoarseness] : no hoarseness [Shortness Of Breath] : no shortness of breath [Wheezing] : no wheezing [de-identified] : blood in nasal mucus 3 times

## 2019-04-11 NOTE — PLAN
[FreeTextEntry1] : voice rest. fluids\par salt water gargles.\par supportive care, fluids. \par  rest. tylenol prn.\par if no improvement, return to office.\par probiotic, antibiotic.\par  pulm. f/u.\par  urology apt. next week re: renal mass left side evaluation.

## 2019-04-11 NOTE — PHYSICAL EXAM
[No Acute Distress] : no acute distress [Well Developed] : well developed [Well-Appearing] : well-appearing [Normal Sclera/Conjunctiva] : normal sclera/conjunctiva [PERRL] : pupils equal round and reactive to light [EOMI] : extraocular movements intact [Normal Outer Ear/Nose] : the outer ears and nose were normal in appearance [No JVD] : no jugular venous distention [Supple] : supple [No Lymphadenopathy] : no lymphadenopathy [Thyroid Normal, No Nodules] : the thyroid was normal and there were no nodules present [No Respiratory Distress] : no respiratory distress  [No Accessory Muscle Use] : no accessory muscle use [Normal Rate] : normal rate  [Regular Rhythm] : with a regular rhythm [Normal S1, S2] : normal S1 and S2 [No Murmur] : no murmur heard [No Carotid Bruits] : no carotid bruits [No Abdominal Bruit] : a ~M bruit was not heard ~T in the abdomen [No Varicosities] : no varicosities [Pedal Pulses Present] : the pedal pulses are present [No Edema] : there was no peripheral edema [No Extremity Clubbing/Cyanosis] : no extremity clubbing/cyanosis [No Palpable Aorta] : no palpable aorta [Normal Posterior Cervical Nodes] : no posterior cervical lymphadenopathy [Normal Anterior Cervical Nodes] : no anterior cervical lymphadenopathy [No CVA Tenderness] : no CVA  tenderness [No Spinal Tenderness] : no spinal tenderness [No Rash] : no rash [Normal Affect] : the affect was normal [Normal Insight/Judgement] : insight and judgment were intact [de-identified] : wheezing b/l

## 2019-04-15 ENCOUNTER — APPOINTMENT (OUTPATIENT)
Dept: UROLOGY | Facility: CLINIC | Age: 49
End: 2019-04-15
Payer: MEDICARE

## 2019-04-15 ENCOUNTER — APPOINTMENT (OUTPATIENT)
Dept: UROLOGY | Facility: CLINIC | Age: 49
End: 2019-04-15

## 2019-04-15 PROCEDURE — 99214 OFFICE O/P EST MOD 30 MIN: CPT
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
## 2019-04-15 NOTE — PHYSICAL EXAM
[General Appearance - Well Developed] : well developed [General Appearance - Well Nourished] : well nourished [General Appearance - In No Acute Distress] : no acute distress [Abdomen Soft] : soft [Abdomen Tenderness] : non-tender [Costovertebral Angle Tenderness] : no ~M costovertebral angle tenderness [Edema] : no peripheral edema [Exaggerated Use Of Accessory Muscles For Inspiration] : no accessory muscle use [Oriented To Time, Place, And Person] : oriented to person, place, and time [Normal Station and Gait] : the gait and station were normal for the patient's age

## 2019-05-01 ENCOUNTER — OUTPATIENT (OUTPATIENT)
Dept: OUTPATIENT SERVICES | Facility: HOSPITAL | Age: 49
LOS: 1 days | End: 2019-05-01
Payer: MEDICARE

## 2019-05-01 VITALS
RESPIRATION RATE: 16 BRPM | WEIGHT: 220.02 LBS | SYSTOLIC BLOOD PRESSURE: 130 MMHG | OXYGEN SATURATION: 98 % | HEART RATE: 64 BPM | DIASTOLIC BLOOD PRESSURE: 80 MMHG | HEIGHT: 68 IN | TEMPERATURE: 98 F

## 2019-05-01 DIAGNOSIS — R06.2 WHEEZING: ICD-10-CM

## 2019-05-01 DIAGNOSIS — N28.89 OTHER SPECIFIED DISORDERS OF KIDNEY AND URETER: ICD-10-CM

## 2019-05-01 DIAGNOSIS — M12.9 ARTHROPATHY, UNSPECIFIED: Chronic | ICD-10-CM

## 2019-05-01 DIAGNOSIS — R06.83 SNORING: ICD-10-CM

## 2019-05-01 DIAGNOSIS — Z98.1 ARTHRODESIS STATUS: Chronic | ICD-10-CM

## 2019-05-01 DIAGNOSIS — M17.10 UNILATERAL PRIMARY OSTEOARTHRITIS, UNSPECIFIED KNEE: Chronic | ICD-10-CM

## 2019-05-01 DIAGNOSIS — R06.83 SNORING: Chronic | ICD-10-CM

## 2019-05-01 LAB
ANION GAP SERPL CALC-SCNC: 15 MMO/L — HIGH (ref 7–14)
APPEARANCE UR: CLEAR — SIGNIFICANT CHANGE UP
BILIRUB UR-MCNC: NEGATIVE — SIGNIFICANT CHANGE UP
BLD GP AB SCN SERPL QL: NEGATIVE — SIGNIFICANT CHANGE UP
BLOOD UR QL VISUAL: NEGATIVE — SIGNIFICANT CHANGE UP
BUN SERPL-MCNC: 14 MG/DL — SIGNIFICANT CHANGE UP (ref 7–23)
CALCIUM SERPL-MCNC: 9.3 MG/DL — SIGNIFICANT CHANGE UP (ref 8.4–10.5)
CHLORIDE SERPL-SCNC: 104 MMOL/L — SIGNIFICANT CHANGE UP (ref 98–107)
CO2 SERPL-SCNC: 23 MMOL/L — SIGNIFICANT CHANGE UP (ref 22–31)
COLOR SPEC: YELLOW — SIGNIFICANT CHANGE UP
CREAT SERPL-MCNC: 0.92 MG/DL — SIGNIFICANT CHANGE UP (ref 0.5–1.3)
GLUCOSE SERPL-MCNC: 70 MG/DL — SIGNIFICANT CHANGE UP (ref 70–99)
GLUCOSE UR-MCNC: NEGATIVE — SIGNIFICANT CHANGE UP
HCT VFR BLD CALC: 44.4 % — SIGNIFICANT CHANGE UP (ref 39–50)
HGB BLD-MCNC: 14 G/DL — SIGNIFICANT CHANGE UP (ref 13–17)
KETONES UR-MCNC: NEGATIVE — SIGNIFICANT CHANGE UP
LEUKOCYTE ESTERASE UR-ACNC: NEGATIVE — SIGNIFICANT CHANGE UP
MCHC RBC-ENTMCNC: 29.1 PG — SIGNIFICANT CHANGE UP (ref 27–34)
MCHC RBC-ENTMCNC: 31.5 % — LOW (ref 32–36)
MCV RBC AUTO: 92.3 FL — SIGNIFICANT CHANGE UP (ref 80–100)
NITRITE UR-MCNC: NEGATIVE — SIGNIFICANT CHANGE UP
NRBC # FLD: 0 K/UL — SIGNIFICANT CHANGE UP (ref 0–0)
PH UR: 6 — SIGNIFICANT CHANGE UP (ref 5–8)
PLATELET # BLD AUTO: 294 K/UL — SIGNIFICANT CHANGE UP (ref 150–400)
PMV BLD: 10.9 FL — SIGNIFICANT CHANGE UP (ref 7–13)
POTASSIUM SERPL-MCNC: 3.9 MMOL/L — SIGNIFICANT CHANGE UP (ref 3.5–5.3)
POTASSIUM SERPL-SCNC: 3.9 MMOL/L — SIGNIFICANT CHANGE UP (ref 3.5–5.3)
PROT UR-MCNC: 10 — SIGNIFICANT CHANGE UP
RBC # BLD: 4.81 M/UL — SIGNIFICANT CHANGE UP (ref 4.2–5.8)
RBC # FLD: 12.6 % — SIGNIFICANT CHANGE UP (ref 10.3–14.5)
RH IG SCN BLD-IMP: POSITIVE — SIGNIFICANT CHANGE UP
SODIUM SERPL-SCNC: 142 MMOL/L — SIGNIFICANT CHANGE UP (ref 135–145)
SP GR SPEC: 1.03 — SIGNIFICANT CHANGE UP (ref 1–1.04)
UROBILINOGEN FLD QL: NORMAL — SIGNIFICANT CHANGE UP
WBC # BLD: 8.35 K/UL — SIGNIFICANT CHANGE UP (ref 3.8–10.5)
WBC # FLD AUTO: 8.35 K/UL — SIGNIFICANT CHANGE UP (ref 3.8–10.5)

## 2019-05-01 PROCEDURE — 93010 ELECTROCARDIOGRAM REPORT: CPT

## 2019-05-01 RX ORDER — AZITHROMYCIN 500 MG/1
1 TABLET, FILM COATED ORAL
Qty: 0 | Refills: 0 | COMMUNITY

## 2019-05-01 RX ORDER — SODIUM CHLORIDE 9 MG/ML
1000 INJECTION, SOLUTION INTRAVENOUS
Refills: 0 | Status: DISCONTINUED | OUTPATIENT
Start: 2019-05-15 | End: 2019-05-15

## 2019-05-01 NOTE — H&P PST ADULT - NSICDXPASTMEDICALHX_GEN_ALL_CORE_FT
PAST MEDICAL HISTORY:  Ankle Fracture left    Anxiety     Hearing loss bilateral    Herniated Disc with nerve damage    Hypertension     Hypothyroid     Infection 2010 staph infection -- had PICC line    Kidney mass bilateral -- operating on the left kidney mass on 5-15-19    Migraines     Nephrolithiasis     Obesity     Old Disrupted ACL (Anterior Cruciate Ligament) left    Snoring ZE precautions -- responds affirmatively to STOP BANG questionnaire PAST MEDICAL HISTORY:  Ankle Fracture left    Anxiety     Hearing loss bilateral    Herniated Disc with nerve damage    Hypertension     Hypothyroid     Infection 2010 staph infection in the blood -- had PICC line    Kidney mass bilateral -- operating on the left kidney mass on 5-15-19    Migraines     Nephrolithiasis     Obesity     Old Disrupted ACL (Anterior Cruciate Ligament) left    Snoring ZE precautions -- responds affirmatively to STOP BANG questionnaire

## 2019-05-01 NOTE — H&P PST ADULT - NEGATIVE GASTROINTESTINAL SYMPTOMS
no vomiting/no constipation/no abdominal pain/no diarrhea/no nausea no vomiting/no diarrhea/no constipation/no nausea
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
## 2019-05-01 NOTE — H&P PST ADULT - RESPIRATORY AND THORAX COMMENTS
has had episodes of wheezing but patient stopped the inhalers on his own -- to see pcp for evaluation

## 2019-05-02 PROBLEM — N28.89 OTHER SPECIFIED DISORDERS OF KIDNEY AND URETER: Chronic | Status: ACTIVE | Noted: 2019-05-01

## 2019-05-02 PROBLEM — R06.83 SNORING: Chronic | Status: ACTIVE | Noted: 2019-05-01

## 2019-05-02 PROBLEM — H91.90 UNSPECIFIED HEARING LOSS, UNSPECIFIED EAR: Chronic | Status: ACTIVE | Noted: 2019-05-01

## 2019-05-02 PROBLEM — B99.9 UNSPECIFIED INFECTIOUS DISEASE: Chronic | Status: ACTIVE | Noted: 2019-05-01

## 2019-05-04 LAB
BACTERIA UR CULT: SIGNIFICANT CHANGE UP
SPECIMEN SOURCE: SIGNIFICANT CHANGE UP

## 2019-05-06 ENCOUNTER — APPOINTMENT (OUTPATIENT)
Dept: FAMILY MEDICINE | Facility: CLINIC | Age: 49
End: 2019-05-06
Payer: MEDICARE

## 2019-05-06 VITALS
BODY MASS INDEX: 33.18 KG/M2 | TEMPERATURE: 98 F | HEART RATE: 95 BPM | WEIGHT: 224 LBS | SYSTOLIC BLOOD PRESSURE: 134 MMHG | OXYGEN SATURATION: 97 % | HEIGHT: 69 IN | DIASTOLIC BLOOD PRESSURE: 80 MMHG

## 2019-05-06 DIAGNOSIS — Z87.19 PERSONAL HISTORY OF OTHER DISEASES OF THE DIGESTIVE SYSTEM: ICD-10-CM

## 2019-05-06 DIAGNOSIS — M79.673 PAIN IN UNSPECIFIED FOOT: ICD-10-CM

## 2019-05-06 DIAGNOSIS — Z86.69 PERSONAL HISTORY OF OTHER DISEASES OF THE NERVOUS SYSTEM AND SENSE ORGANS: ICD-10-CM

## 2019-05-06 DIAGNOSIS — M25.512 PAIN IN LEFT SHOULDER: ICD-10-CM

## 2019-05-06 DIAGNOSIS — Z87.898 PERSONAL HISTORY OF OTHER SPECIFIED CONDITIONS: ICD-10-CM

## 2019-05-06 DIAGNOSIS — M79.89 OTHER SPECIFIED SOFT TISSUE DISORDERS: ICD-10-CM

## 2019-05-06 DIAGNOSIS — R09.89 OTHER SPECIFIED SYMPTOMS AND SIGNS INVOLVING THE CIRCULATORY AND RESPIRATORY SYSTEMS: ICD-10-CM

## 2019-05-06 DIAGNOSIS — Z87.448 PERSONAL HISTORY OF OTHER DISEASES OF URINARY SYSTEM: ICD-10-CM

## 2019-05-06 DIAGNOSIS — M25.511 PAIN IN RIGHT SHOULDER: ICD-10-CM

## 2019-05-06 DIAGNOSIS — Z86.79 PERSONAL HISTORY OF OTHER DISEASES OF THE CIRCULATORY SYSTEM: ICD-10-CM

## 2019-05-06 DIAGNOSIS — M54.9 DORSALGIA, UNSPECIFIED: ICD-10-CM

## 2019-05-06 DIAGNOSIS — Z86.39 PERSONAL HISTORY OF OTHER ENDOCRINE, NUTRITIONAL AND METABOLIC DISEASE: ICD-10-CM

## 2019-05-06 DIAGNOSIS — N28.1 CYST OF KIDNEY, ACQUIRED: ICD-10-CM

## 2019-05-06 DIAGNOSIS — Z11.3 ENCOUNTER FOR SCREENING FOR INFECTIONS WITH A PREDOMINANTLY SEXUAL MODE OF TRANSMISSION: ICD-10-CM

## 2019-05-06 PROBLEM — F41.9 ANXIETY DISORDER, UNSPECIFIED: Chronic | Status: ACTIVE | Noted: 2019-05-01

## 2019-05-06 PROBLEM — N20.0 CALCULUS OF KIDNEY: Chronic | Status: ACTIVE | Noted: 2019-05-01

## 2019-05-06 PROBLEM — I10 ESSENTIAL (PRIMARY) HYPERTENSION: Chronic | Status: ACTIVE | Noted: 2019-05-01

## 2019-05-06 PROBLEM — G43.909 MIGRAINE, UNSPECIFIED, NOT INTRACTABLE, WITHOUT STATUS MIGRAINOSUS: Chronic | Status: ACTIVE | Noted: 2019-05-01

## 2019-05-06 PROCEDURE — 99215 OFFICE O/P EST HI 40 MIN: CPT

## 2019-05-06 RX ORDER — IBUPROFEN 800 MG/1
800 TABLET, FILM COATED ORAL EVERY 8 HOURS
Qty: 30 | Refills: 0 | Status: DISCONTINUED | COMMUNITY
Start: 2019-01-11 | End: 2019-05-06

## 2019-05-06 RX ORDER — FLUTICASONE PROPIONATE 44 UG/1
44 AEROSOL, METERED RESPIRATORY (INHALATION)
Qty: 1 | Refills: 0 | Status: DISCONTINUED | COMMUNITY
Start: 2019-04-11 | End: 2019-05-06

## 2019-05-06 RX ORDER — LEVOFLOXACIN 500 MG/1
500 TABLET, FILM COATED ORAL DAILY
Qty: 10 | Refills: 0 | Status: DISCONTINUED | COMMUNITY
Start: 2019-04-11 | End: 2019-05-06

## 2019-05-06 RX ORDER — PROMETHAZINE HYDROCHLORIDE AND DEXTROMETHORPHAN HYDROBROMIDE ORAL SOLUTION 15; 6.25 MG/5ML; MG/5ML
6.25-15 SOLUTION ORAL
Qty: 100 | Refills: 0 | Status: DISCONTINUED | COMMUNITY
Start: 2019-04-11 | End: 2019-05-06

## 2019-05-06 RX ORDER — ALBUTEROL SULFATE 90 UG/1
108 (90 BASE) AEROSOL, METERED RESPIRATORY (INHALATION)
Qty: 1 | Refills: 0 | Status: DISCONTINUED | COMMUNITY
Start: 2019-04-11 | End: 2019-05-06

## 2019-05-06 NOTE — RESULTS/DATA
[] : results reviewed [de-identified] : wnl [de-identified] : sinus bradycardia, sinus rhythm  [de-identified] : wnl

## 2019-05-06 NOTE — PHYSICAL EXAM
[No Acute Distress] : no acute distress [Well Nourished] : well nourished [Well Developed] : well developed [Well-Appearing] : well-appearing [Normal Sclera/Conjunctiva] : normal sclera/conjunctiva [PERRL] : pupils equal round and reactive to light [EOMI] : extraocular movements intact [Normal Outer Ear/Nose] : the outer ears and nose were normal in appearance [Normal Oropharynx] : the oropharynx was normal [No JVD] : no jugular venous distention [No Lymphadenopathy] : no lymphadenopathy [Supple] : supple [Thyroid Normal, No Nodules] : the thyroid was normal and there were no nodules present [No Respiratory Distress] : no respiratory distress  [Clear to Auscultation] : lungs were clear to auscultation bilaterally [No Accessory Muscle Use] : no accessory muscle use [Normal Rate] : normal rate  [Regular Rhythm] : with a regular rhythm [No Murmur] : no murmur heard [Normal S1, S2] : normal S1 and S2 [No Carotid Bruits] : no carotid bruits [No Abdominal Bruit] : a ~M bruit was not heard ~T in the abdomen [Pedal Pulses Present] : the pedal pulses are present [No Varicosities] : no varicosities [No Edema] : there was no peripheral edema [No Extremity Clubbing/Cyanosis] : no extremity clubbing/cyanosis [No Palpable Aorta] : no palpable aorta [Non Tender] : non-tender [Soft] : abdomen soft [Non-distended] : non-distended [No Masses] : no abdominal mass palpated [No HSM] : no HSM [Normal Bowel Sounds] : normal bowel sounds [Normal Posterior Cervical Nodes] : no posterior cervical lymphadenopathy [No CVA Tenderness] : no CVA  tenderness [Normal Anterior Cervical Nodes] : no anterior cervical lymphadenopathy [No Joint Swelling] : no joint swelling [No Spinal Tenderness] : no spinal tenderness [Grossly Normal Strength/Tone] : grossly normal strength/tone [No Rash] : no rash [Normal Gait] : normal gait [Coordination Grossly Intact] : coordination grossly intact [No Focal Deficits] : no focal deficits [Deep Tendon Reflexes (DTR)] : deep tendon reflexes were 2+ and symmetric [Normal Affect] : the affect was normal [Normal Insight/Judgement] : insight and judgment were intact

## 2019-05-06 NOTE — HISTORY OF PRESENT ILLNESS
[No Pertinent Cardiac History] : no history of aortic stenosis, atrial fibrillation, coronary artery disease, recent myocardial infarction, or implantable device/pacemaker [No Adverse Anesthesia Reaction] : no adverse anesthesia reaction in self or family member [No Pertinent Pulmonary History] : no history of asthma, COPD, sleep apnea, or smoking [(Patient denies any chest pain, claudication, dyspnea on exertion, orthopnea, palpitations or syncope)] : Patient denies any chest pain, claudication, dyspnea on exertion, orthopnea, palpitations or syncope [Chronic Anticoagulation] : no chronic anticoagulation [Chronic Kidney Disease] : no chronic kidney disease [Diabetes] : no diabetes [FreeTextEntry1] : left partial nephrectomy possibly open [FreeTextEntry2] : 05/15/2019 [FreeTextEntry3] : dr. Candi Mallory [FreeTextEntry4] : Here for preop. clearance. c/o foggy brain due to left kidney pain .

## 2019-05-06 NOTE — PLAN
[FreeTextEntry1] : no contraindication to surgery. \par labs and EKG reviewed. wnl.\par fax note to MICHELE.

## 2019-05-06 NOTE — COUNSELING
[Healthy eating counseling provided] : healthy eating [Activity counseling provided] : activity [Behavioral health counseling provided] : behavioral health  [___ min/wk activity recommended] : [unfilled] min/wk activity recommended [Walking] : Walking [Engage in a relaxing activity] : Engage in a relaxing activity [None] : None [Needs reinforcement, provided] : Patient needs reinforcement on understanding lifestyle changes and  the steps needed to achieve self management goals and reinforcement was provided

## 2019-05-06 NOTE — ASSESSMENT
[High Risk Surgery - Intraperitoneal, Intrathoracic or Supringuinal Vascular Procedures] : High Risk Surgery - Intraperitoneal, Intrathoracic or Supringuinal Vascular Procedures - No (0) [Ischemic Heart Disease] : Ischemic Heart Disease - No (0) [Prior Cerebrovascular Accident or TIA] : Prior Cerebrovascular Accident or TIA - No (0) [Congestive Heart Failure] : Congestive Heart Failure - No (0) [Creatinine >= 2mg/dL (1 Point)] : Creatinine >= 2mg/dL - No (0) [Insulin-dependent Diabetic (1 Point)] : Insulin-dependent Diabetic - No (0) [Patient Optimized for Surgery] : Patient optimized for surgery [0] : 0 , RCRI Class: I, Risk of Post-Op Cardiac Complications: 0.4%, Procedure Risk: Low-Risk [No Further Testing Recommended] : no further testing recommended

## 2019-05-07 ENCOUNTER — MESSAGE (OUTPATIENT)
Age: 49
End: 2019-05-07

## 2019-05-09 ENCOUNTER — APPOINTMENT (OUTPATIENT)
Dept: UROLOGY | Facility: CLINIC | Age: 49
End: 2019-05-09
Payer: MEDICARE

## 2019-05-09 VITALS
SYSTOLIC BLOOD PRESSURE: 140 MMHG | HEIGHT: 70 IN | RESPIRATION RATE: 14 BRPM | HEART RATE: 63 BPM | BODY MASS INDEX: 31.64 KG/M2 | DIASTOLIC BLOOD PRESSURE: 90 MMHG | OXYGEN SATURATION: 98 % | WEIGHT: 221 LBS | TEMPERATURE: 97.9 F

## 2019-05-09 PROCEDURE — 99214 OFFICE O/P EST MOD 30 MIN: CPT

## 2019-05-13 NOTE — ASSESSMENT
[FreeTextEntry1] : Small renal mass. Discussed that there is about 70-80% chance of this potentially being consistent with kidney cancer, 20-30% benign. Discussed the imperfect nature of radiologic exams in distinguishing between benign and malignant lesions. Also discussed the imperfect nature of renal biopsy with respect to negative predictive value. Discussed options for management including observation, ablation and extirpation. Lesions less <3cm in size have low metastatic potential. Pt most interested in definitive tx which is reasonable given his young age. DIscussed partial nephrectomy via laparoscopic approach. Discussed risks including but not limited to bleeding, infection, injury to intra-abdominal contents, urine leak, conversion to radical nephrectomy conversion to open. Again reinforced with pt that current sx do not appear to be related to renal lesion\par --lap partial nephrectomy as planned\par --Medical clearance prior to surgery
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
## 2019-05-13 NOTE — PHYSICAL EXAM
[General Appearance - Well Developed] : well developed [General Appearance - Well Nourished] : well nourished [General Appearance - In No Acute Distress] : no acute distress [Abdomen Soft] : soft [Abdomen Tenderness] : non-tender [Costovertebral Angle Tenderness] : no ~M costovertebral angle tenderness [Edema] : no peripheral edema [Exaggerated Use Of Accessory Muscles For Inspiration] : no accessory muscle use [Normal Station and Gait] : the gait and station were normal for the patient's age [Oriented To Time, Place, And Person] : oriented to person, place, and time

## 2019-05-14 ENCOUNTER — TRANSCRIPTION ENCOUNTER (OUTPATIENT)
Age: 49
End: 2019-05-14

## 2019-05-15 ENCOUNTER — APPOINTMENT (OUTPATIENT)
Dept: UROLOGY | Facility: HOSPITAL | Age: 49
End: 2019-05-15

## 2019-05-15 ENCOUNTER — INPATIENT (INPATIENT)
Facility: HOSPITAL | Age: 49
LOS: 3 days | Discharge: HOME | End: 2019-05-19
Attending: UROLOGY | Admitting: UROLOGY
Payer: MEDICARE

## 2019-05-15 ENCOUNTER — RESULT REVIEW (OUTPATIENT)
Age: 49
End: 2019-05-15

## 2019-05-15 VITALS
RESPIRATION RATE: 16 BRPM | WEIGHT: 220.02 LBS | HEART RATE: 60 BPM | OXYGEN SATURATION: 96 % | SYSTOLIC BLOOD PRESSURE: 99 MMHG | TEMPERATURE: 98 F | DIASTOLIC BLOOD PRESSURE: 77 MMHG | HEIGHT: 68 IN

## 2019-05-15 DIAGNOSIS — M12.9 ARTHROPATHY, UNSPECIFIED: Chronic | ICD-10-CM

## 2019-05-15 DIAGNOSIS — Z98.1 ARTHRODESIS STATUS: Chronic | ICD-10-CM

## 2019-05-15 DIAGNOSIS — N28.89 OTHER SPECIFIED DISORDERS OF KIDNEY AND URETER: ICD-10-CM

## 2019-05-15 DIAGNOSIS — M17.10 UNILATERAL PRIMARY OSTEOARTHRITIS, UNSPECIFIED KNEE: Chronic | ICD-10-CM

## 2019-05-15 DIAGNOSIS — R06.83 SNORING: Chronic | ICD-10-CM

## 2019-05-15 LAB
ANION GAP SERPL CALC-SCNC: 13 MMO/L — SIGNIFICANT CHANGE UP (ref 7–14)
BASOPHILS # BLD AUTO: 0.02 K/UL — SIGNIFICANT CHANGE UP (ref 0–0.2)
BASOPHILS NFR BLD AUTO: 0.1 % — SIGNIFICANT CHANGE UP (ref 0–2)
BUN SERPL-MCNC: 13 MG/DL — SIGNIFICANT CHANGE UP (ref 7–23)
CALCIUM SERPL-MCNC: 8.4 MG/DL — SIGNIFICANT CHANGE UP (ref 8.4–10.5)
CHLORIDE SERPL-SCNC: 106 MMOL/L — SIGNIFICANT CHANGE UP (ref 98–107)
CO2 SERPL-SCNC: 21 MMOL/L — LOW (ref 22–31)
CREAT SERPL-MCNC: 0.93 MG/DL — SIGNIFICANT CHANGE UP (ref 0.5–1.3)
EOSINOPHIL # BLD AUTO: 0 K/UL — SIGNIFICANT CHANGE UP (ref 0–0.5)
EOSINOPHIL NFR BLD AUTO: 0 % — SIGNIFICANT CHANGE UP (ref 0–6)
GLUCOSE SERPL-MCNC: 145 MG/DL — HIGH (ref 70–99)
HCT VFR BLD CALC: 38.3 % — LOW (ref 39–50)
HGB BLD-MCNC: 12.3 G/DL — LOW (ref 13–17)
IMM GRANULOCYTES NFR BLD AUTO: 0.5 % — SIGNIFICANT CHANGE UP (ref 0–1.5)
LYMPHOCYTES # BLD AUTO: 1.03 K/UL — SIGNIFICANT CHANGE UP (ref 1–3.3)
LYMPHOCYTES # BLD AUTO: 5.6 % — LOW (ref 13–44)
MCHC RBC-ENTMCNC: 28.9 PG — SIGNIFICANT CHANGE UP (ref 27–34)
MCHC RBC-ENTMCNC: 32.1 % — SIGNIFICANT CHANGE UP (ref 32–36)
MCV RBC AUTO: 89.9 FL — SIGNIFICANT CHANGE UP (ref 80–100)
MONOCYTES # BLD AUTO: 0.33 K/UL — SIGNIFICANT CHANGE UP (ref 0–0.9)
MONOCYTES NFR BLD AUTO: 1.8 % — LOW (ref 2–14)
NEUTROPHILS # BLD AUTO: 16.79 K/UL — HIGH (ref 1.8–7.4)
NEUTROPHILS NFR BLD AUTO: 92 % — HIGH (ref 43–77)
NRBC # FLD: 0 K/UL — SIGNIFICANT CHANGE UP (ref 0–0)
PLATELET # BLD AUTO: 289 K/UL — SIGNIFICANT CHANGE UP (ref 150–400)
PMV BLD: 10.2 FL — SIGNIFICANT CHANGE UP (ref 7–13)
POTASSIUM SERPL-MCNC: 4.5 MMOL/L — SIGNIFICANT CHANGE UP (ref 3.5–5.3)
POTASSIUM SERPL-SCNC: 4.5 MMOL/L — SIGNIFICANT CHANGE UP (ref 3.5–5.3)
RBC # BLD: 4.26 M/UL — SIGNIFICANT CHANGE UP (ref 4.2–5.8)
RBC # FLD: 12.6 % — SIGNIFICANT CHANGE UP (ref 10.3–14.5)
RH IG SCN BLD-IMP: POSITIVE — SIGNIFICANT CHANGE UP
SODIUM SERPL-SCNC: 140 MMOL/L — SIGNIFICANT CHANGE UP (ref 135–145)
WBC # BLD: 18.27 K/UL — HIGH (ref 3.8–10.5)
WBC # FLD AUTO: 18.27 K/UL — HIGH (ref 3.8–10.5)

## 2019-05-15 PROCEDURE — 50543 LAPARO PARTIAL NEPHRECTOMY: CPT | Mod: LT

## 2019-05-15 PROCEDURE — 76998 US GUIDE INTRAOP: CPT | Mod: 26

## 2019-05-15 PROCEDURE — 88307 TISSUE EXAM BY PATHOLOGIST: CPT | Mod: 26

## 2019-05-15 PROCEDURE — 88312 SPECIAL STAINS GROUP 1: CPT | Mod: 26

## 2019-05-15 RX ORDER — ONDANSETRON 8 MG/1
4 TABLET, FILM COATED ORAL EVERY 6 HOURS
Refills: 0 | Status: DISCONTINUED | OUTPATIENT
Start: 2019-05-15 | End: 2019-05-19

## 2019-05-15 RX ORDER — TRAMADOL HYDROCHLORIDE 50 MG/1
25 TABLET ORAL EVERY 4 HOURS
Refills: 0 | Status: DISCONTINUED | OUTPATIENT
Start: 2019-05-15 | End: 2019-05-18

## 2019-05-15 RX ORDER — LEVOTHYROXINE SODIUM 125 MCG
100 TABLET ORAL DAILY
Refills: 0 | Status: DISCONTINUED | OUTPATIENT
Start: 2019-05-15 | End: 2019-05-19

## 2019-05-15 RX ORDER — ACETAMINOPHEN 500 MG
1000 TABLET ORAL ONCE
Refills: 0 | Status: COMPLETED | OUTPATIENT
Start: 2019-05-16 | End: 2019-05-16

## 2019-05-15 RX ORDER — SODIUM CHLORIDE 9 MG/ML
1000 INJECTION, SOLUTION INTRAVENOUS
Refills: 0 | Status: DISCONTINUED | OUTPATIENT
Start: 2019-05-15 | End: 2019-05-16

## 2019-05-15 RX ORDER — ACETAMINOPHEN 500 MG
1000 TABLET ORAL ONCE
Refills: 0 | Status: COMPLETED | OUTPATIENT
Start: 2019-05-15 | End: 2019-05-15

## 2019-05-15 RX ORDER — METOPROLOL TARTRATE 50 MG
25 TABLET ORAL DAILY
Refills: 0 | Status: DISCONTINUED | OUTPATIENT
Start: 2019-05-15 | End: 2019-05-19

## 2019-05-15 RX ORDER — FENTANYL CITRATE 50 UG/ML
25 INJECTION INTRAVENOUS
Refills: 0 | Status: DISCONTINUED | OUTPATIENT
Start: 2019-05-15 | End: 2019-05-15

## 2019-05-15 RX ORDER — ONDANSETRON 8 MG/1
4 TABLET, FILM COATED ORAL ONCE
Refills: 0 | Status: DISCONTINUED | OUTPATIENT
Start: 2019-05-15 | End: 2019-05-15

## 2019-05-15 RX ORDER — HYDROMORPHONE HYDROCHLORIDE 2 MG/ML
1 INJECTION INTRAMUSCULAR; INTRAVENOUS; SUBCUTANEOUS
Refills: 0 | Status: DISCONTINUED | OUTPATIENT
Start: 2019-05-15 | End: 2019-05-15

## 2019-05-15 RX ORDER — HEPARIN SODIUM 5000 [USP'U]/ML
5000 INJECTION INTRAVENOUS; SUBCUTANEOUS EVERY 8 HOURS
Refills: 0 | Status: DISCONTINUED | OUTPATIENT
Start: 2019-05-15 | End: 2019-05-19

## 2019-05-15 RX ORDER — HEPARIN SODIUM 5000 [USP'U]/ML
5000 INJECTION INTRAVENOUS; SUBCUTANEOUS EVERY 8 HOURS
Refills: 0 | Status: DISCONTINUED | OUTPATIENT
Start: 2019-05-15 | End: 2019-05-15

## 2019-05-15 RX ORDER — TRAMADOL HYDROCHLORIDE 50 MG/1
50 TABLET ORAL EVERY 4 HOURS
Refills: 0 | Status: DISCONTINUED | OUTPATIENT
Start: 2019-05-15 | End: 2019-05-18

## 2019-05-15 RX ADMIN — SODIUM CHLORIDE 125 MILLILITER(S): 9 INJECTION, SOLUTION INTRAVENOUS at 23:03

## 2019-05-15 RX ADMIN — TRAMADOL HYDROCHLORIDE 50 MILLIGRAM(S): 50 TABLET ORAL at 23:59

## 2019-05-15 RX ADMIN — Medication 1000 MILLIGRAM(S): at 23:04

## 2019-05-15 RX ADMIN — HYDROMORPHONE HYDROCHLORIDE 1 MILLIGRAM(S): 2 INJECTION INTRAMUSCULAR; INTRAVENOUS; SUBCUTANEOUS at 21:47

## 2019-05-15 RX ADMIN — HYDROMORPHONE HYDROCHLORIDE 1 MILLIGRAM(S): 2 INJECTION INTRAMUSCULAR; INTRAVENOUS; SUBCUTANEOUS at 22:05

## 2019-05-15 RX ADMIN — Medication 400 MILLIGRAM(S): at 23:03

## 2019-05-15 RX ADMIN — HYDROMORPHONE HYDROCHLORIDE 1 MILLIGRAM(S): 2 INJECTION INTRAMUSCULAR; INTRAVENOUS; SUBCUTANEOUS at 21:50

## 2019-05-15 RX ADMIN — HEPARIN SODIUM 5000 UNIT(S): 5000 INJECTION INTRAVENOUS; SUBCUTANEOUS at 21:36

## 2019-05-15 RX ADMIN — HYDROMORPHONE HYDROCHLORIDE 1 MILLIGRAM(S): 2 INJECTION INTRAMUSCULAR; INTRAVENOUS; SUBCUTANEOUS at 21:27

## 2019-05-16 DIAGNOSIS — M54.5 LOW BACK PAIN: ICD-10-CM

## 2019-05-16 DIAGNOSIS — Z29.9 ENCOUNTER FOR PROPHYLACTIC MEASURES, UNSPECIFIED: ICD-10-CM

## 2019-05-16 DIAGNOSIS — E03.9 HYPOTHYROIDISM, UNSPECIFIED: ICD-10-CM

## 2019-05-16 DIAGNOSIS — I10 ESSENTIAL (PRIMARY) HYPERTENSION: ICD-10-CM

## 2019-05-16 DIAGNOSIS — F41.9 ANXIETY DISORDER, UNSPECIFIED: ICD-10-CM

## 2019-05-16 LAB
ANION GAP SERPL CALC-SCNC: 11 MMO/L — SIGNIFICANT CHANGE UP (ref 7–14)
BUN SERPL-MCNC: 14 MG/DL — SIGNIFICANT CHANGE UP (ref 7–23)
CALCIUM SERPL-MCNC: 8.3 MG/DL — LOW (ref 8.4–10.5)
CHLORIDE SERPL-SCNC: 105 MMOL/L — SIGNIFICANT CHANGE UP (ref 98–107)
CO2 SERPL-SCNC: 23 MMOL/L — SIGNIFICANT CHANGE UP (ref 22–31)
CREAT SERPL-MCNC: 1.02 MG/DL — SIGNIFICANT CHANGE UP (ref 0.5–1.3)
GLUCOSE SERPL-MCNC: 119 MG/DL — HIGH (ref 70–99)
HCT VFR BLD CALC: 34.1 % — LOW (ref 39–50)
HGB BLD-MCNC: 11.1 G/DL — LOW (ref 13–17)
MCHC RBC-ENTMCNC: 28.6 PG — SIGNIFICANT CHANGE UP (ref 27–34)
MCHC RBC-ENTMCNC: 32.6 % — SIGNIFICANT CHANGE UP (ref 32–36)
MCV RBC AUTO: 87.9 FL — SIGNIFICANT CHANGE UP (ref 80–100)
NRBC # FLD: 0 K/UL — SIGNIFICANT CHANGE UP (ref 0–0)
PLATELET # BLD AUTO: 259 K/UL — SIGNIFICANT CHANGE UP (ref 150–400)
PMV BLD: 10.3 FL — SIGNIFICANT CHANGE UP (ref 7–13)
POTASSIUM SERPL-MCNC: 4.2 MMOL/L — SIGNIFICANT CHANGE UP (ref 3.5–5.3)
POTASSIUM SERPL-SCNC: 4.2 MMOL/L — SIGNIFICANT CHANGE UP (ref 3.5–5.3)
RBC # BLD: 3.88 M/UL — LOW (ref 4.2–5.8)
RBC # FLD: 12.8 % — SIGNIFICANT CHANGE UP (ref 10.3–14.5)
SODIUM SERPL-SCNC: 139 MMOL/L — SIGNIFICANT CHANGE UP (ref 135–145)
WBC # BLD: 16 K/UL — HIGH (ref 3.8–10.5)
WBC # FLD AUTO: 16 K/UL — HIGH (ref 3.8–10.5)

## 2019-05-16 PROCEDURE — 99223 1ST HOSP IP/OBS HIGH 75: CPT

## 2019-05-16 PROCEDURE — 93010 ELECTROCARDIOGRAM REPORT: CPT

## 2019-05-16 RX ORDER — HYDROMORPHONE HYDROCHLORIDE 2 MG/ML
0.5 INJECTION INTRAMUSCULAR; INTRAVENOUS; SUBCUTANEOUS ONCE
Refills: 0 | Status: DISCONTINUED | OUTPATIENT
Start: 2019-05-16 | End: 2019-05-16

## 2019-05-16 RX ORDER — SODIUM CHLORIDE 9 MG/ML
1000 INJECTION, SOLUTION INTRAVENOUS
Refills: 0 | Status: DISCONTINUED | OUTPATIENT
Start: 2019-05-16 | End: 2019-05-16

## 2019-05-16 RX ORDER — CYCLOBENZAPRINE HYDROCHLORIDE 10 MG/1
5 TABLET, FILM COATED ORAL THREE TIMES A DAY
Refills: 0 | Status: DISCONTINUED | OUTPATIENT
Start: 2019-05-16 | End: 2019-05-19

## 2019-05-16 RX ORDER — SODIUM CHLORIDE 9 MG/ML
1000 INJECTION, SOLUTION INTRAVENOUS
Refills: 0 | Status: DISCONTINUED | OUTPATIENT
Start: 2019-05-16 | End: 2019-05-17

## 2019-05-16 RX ORDER — ALPRAZOLAM 0.25 MG
1 TABLET ORAL ONCE
Refills: 0 | Status: DISCONTINUED | OUTPATIENT
Start: 2019-05-16 | End: 2019-05-16

## 2019-05-16 RX ORDER — ALPRAZOLAM 0.25 MG
1 TABLET ORAL EVERY 6 HOURS
Refills: 0 | Status: DISCONTINUED | OUTPATIENT
Start: 2019-05-16 | End: 2019-05-19

## 2019-05-16 RX ORDER — ALPRAZOLAM 0.25 MG
2 TABLET ORAL EVERY 12 HOURS
Refills: 0 | Status: DISCONTINUED | OUTPATIENT
Start: 2019-05-16 | End: 2019-05-16

## 2019-05-16 RX ORDER — KETOROLAC TROMETHAMINE 30 MG/ML
15 SYRINGE (ML) INJECTION ONCE
Refills: 0 | Status: DISCONTINUED | OUTPATIENT
Start: 2019-05-16 | End: 2019-05-16

## 2019-05-16 RX ORDER — DOCUSATE SODIUM 100 MG
100 CAPSULE ORAL
Refills: 0 | Status: DISCONTINUED | OUTPATIENT
Start: 2019-05-16 | End: 2019-05-19

## 2019-05-16 RX ORDER — SENNA PLUS 8.6 MG/1
2 TABLET ORAL AT BEDTIME
Refills: 0 | Status: DISCONTINUED | OUTPATIENT
Start: 2019-05-16 | End: 2019-05-19

## 2019-05-16 RX ORDER — KETOROLAC TROMETHAMINE 30 MG/ML
15 SYRINGE (ML) INJECTION EVERY 6 HOURS
Refills: 0 | Status: DISCONTINUED | OUTPATIENT
Start: 2019-05-16 | End: 2019-05-17

## 2019-05-16 RX ADMIN — ONDANSETRON 4 MILLIGRAM(S): 8 TABLET, FILM COATED ORAL at 00:54

## 2019-05-16 RX ADMIN — HEPARIN SODIUM 5000 UNIT(S): 5000 INJECTION INTRAVENOUS; SUBCUTANEOUS at 21:11

## 2019-05-16 RX ADMIN — TRAMADOL HYDROCHLORIDE 50 MILLIGRAM(S): 50 TABLET ORAL at 17:49

## 2019-05-16 RX ADMIN — Medication 15 MILLIGRAM(S): at 09:00

## 2019-05-16 RX ADMIN — ONDANSETRON 4 MILLIGRAM(S): 8 TABLET, FILM COATED ORAL at 07:21

## 2019-05-16 RX ADMIN — TRAMADOL HYDROCHLORIDE 50 MILLIGRAM(S): 50 TABLET ORAL at 22:10

## 2019-05-16 RX ADMIN — Medication 15 MILLIGRAM(S): at 14:18

## 2019-05-16 RX ADMIN — CYCLOBENZAPRINE HYDROCHLORIDE 5 MILLIGRAM(S): 10 TABLET, FILM COATED ORAL at 15:36

## 2019-05-16 RX ADMIN — Medication 1 MILLIGRAM(S): at 04:13

## 2019-05-16 RX ADMIN — Medication 400 MILLIGRAM(S): at 12:33

## 2019-05-16 RX ADMIN — SENNA PLUS 2 TABLET(S): 8.6 TABLET ORAL at 21:11

## 2019-05-16 RX ADMIN — Medication 100 MICROGRAM(S): at 06:32

## 2019-05-16 RX ADMIN — HEPARIN SODIUM 5000 UNIT(S): 5000 INJECTION INTRAVENOUS; SUBCUTANEOUS at 14:16

## 2019-05-16 RX ADMIN — Medication 400 MILLIGRAM(S): at 18:23

## 2019-05-16 RX ADMIN — Medication 100 MILLIGRAM(S): at 18:23

## 2019-05-16 RX ADMIN — Medication 1 MILLIGRAM(S): at 20:42

## 2019-05-16 RX ADMIN — HYDROMORPHONE HYDROCHLORIDE 0.5 MILLIGRAM(S): 2 INJECTION INTRAMUSCULAR; INTRAVENOUS; SUBCUTANEOUS at 00:55

## 2019-05-16 RX ADMIN — TRAMADOL HYDROCHLORIDE 50 MILLIGRAM(S): 50 TABLET ORAL at 21:12

## 2019-05-16 RX ADMIN — TRAMADOL HYDROCHLORIDE 50 MILLIGRAM(S): 50 TABLET ORAL at 07:19

## 2019-05-16 RX ADMIN — Medication 1000 MILLIGRAM(S): at 13:10

## 2019-05-16 RX ADMIN — TRAMADOL HYDROCHLORIDE 50 MILLIGRAM(S): 50 TABLET ORAL at 11:30

## 2019-05-16 RX ADMIN — CYCLOBENZAPRINE HYDROCHLORIDE 5 MILLIGRAM(S): 10 TABLET, FILM COATED ORAL at 22:13

## 2019-05-16 RX ADMIN — Medication 400 MILLIGRAM(S): at 06:31

## 2019-05-16 RX ADMIN — TRAMADOL HYDROCHLORIDE 50 MILLIGRAM(S): 50 TABLET ORAL at 16:39

## 2019-05-16 RX ADMIN — Medication 15 MILLIGRAM(S): at 20:43

## 2019-05-16 RX ADMIN — TRAMADOL HYDROCHLORIDE 50 MILLIGRAM(S): 50 TABLET ORAL at 07:55

## 2019-05-16 RX ADMIN — Medication 15 MILLIGRAM(S): at 14:37

## 2019-05-16 RX ADMIN — SODIUM CHLORIDE 75 MILLILITER(S): 9 INJECTION, SOLUTION INTRAVENOUS at 12:35

## 2019-05-16 RX ADMIN — Medication 1 MILLIGRAM(S): at 13:00

## 2019-05-16 RX ADMIN — Medication 1000 MILLIGRAM(S): at 18:56

## 2019-05-16 RX ADMIN — HYDROMORPHONE HYDROCHLORIDE 0.5 MILLIGRAM(S): 2 INJECTION INTRAMUSCULAR; INTRAVENOUS; SUBCUTANEOUS at 01:20

## 2019-05-16 RX ADMIN — SODIUM CHLORIDE 75 MILLILITER(S): 9 INJECTION, SOLUTION INTRAVENOUS at 21:12

## 2019-05-16 RX ADMIN — Medication 15 MILLIGRAM(S): at 08:51

## 2019-05-16 RX ADMIN — Medication 10 MILLIGRAM(S): at 12:34

## 2019-05-16 RX ADMIN — Medication 1 MILLIGRAM(S): at 04:51

## 2019-05-16 RX ADMIN — TRAMADOL HYDROCHLORIDE 50 MILLIGRAM(S): 50 TABLET ORAL at 10:44

## 2019-05-16 RX ADMIN — Medication 25 MILLIGRAM(S): at 06:31

## 2019-05-16 RX ADMIN — HEPARIN SODIUM 5000 UNIT(S): 5000 INJECTION INTRAVENOUS; SUBCUTANEOUS at 07:19

## 2019-05-16 RX ADMIN — TRAMADOL HYDROCHLORIDE 50 MILLIGRAM(S): 50 TABLET ORAL at 00:22

## 2019-05-16 NOTE — CONSULT NOTE ADULT - PROBLEM SELECTOR RECOMMENDATION 2
BP well controlled.  -Continue Metoprolol No signs of infection, likely due to stress from the procedure.  -Trend CBC  -Monitor for signs of infection

## 2019-05-16 NOTE — CONSULT NOTE ADULT - ASSESSMENT
This is a 49 y/o male with h/o HTN, [place], herniated disc, low back pain, anxiety admitted for lap left partial nephrectomy. S/P OR, POD#1.

## 2019-05-16 NOTE — CONSULT NOTE ADULT - PROBLEM SELECTOR RECOMMENDATION 5
Asymptomatic. Pt has been anxious, had one episode of panic attack last night.   -Xanax 1mg PO Q6H PRN for anxiety

## 2019-05-16 NOTE — CONSULT NOTE ADULT - SUBJECTIVE AND OBJECTIVE BOX
Patient is a 48y old  Male who presents with a chief complaint of renal mass      HPI:  This is a 47 y/o male with h/o HTN, [place], herniated disc, low back pain, anxiety admitted for lap left partial nephrectomy. S/P OR, POD#1.  Only c/o anxiety and should pain. Denies any CP or SOB, pt had a panic attack last night, relieved by Xanx. (+) flatus this morning.     History limited due to: [ ] Dementia  [ ] Delirium  [ ] Condition    Pain Symptoms if applicable:             	                         	   mild         Pain:	                            	    1-3	       Location:	abd and shoulder  Modifying factors:	  Associated symptoms:	    Function: [x ] Independent  [ ] Assistance  [ ] Total care  [ ] Non-ambulatory    Allergies    No Known Allergies    Intolerances        HOME MEDICATIONS: [x ] Reviewed    MEDICATIONS  (STANDING):  acetaminophen  IVPB .. 1000 milliGRAM(s) IV Intermittent once  dextrose 5% + sodium chloride 0.45%. 1000 milliLiter(s) (75 mL/Hr) IV Continuous <Continuous>  docusate sodium 100 milliGRAM(s) Oral two times a day  heparin  Injectable 5000 Unit(s) SubCutaneous every 8 hours  ketorolac   Injectable 15 milliGRAM(s) IV Push every 6 hours  levothyroxine 100 MICROGram(s) Oral daily  metoprolol succinate ER 25 milliGRAM(s) Oral daily  senna 2 Tablet(s) Oral at bedtime    MEDICATIONS  (PRN):  ALPRAZolam 1 milliGRAM(s) Oral every 6 hours PRN Anxiety  ondansetron Injectable 4 milliGRAM(s) IV Push every 6 hours PRN Nausea and/or Vomiting  traMADol 25 milliGRAM(s) Oral every 4 hours PRN Moderate Pain (4 - 6)  traMADol 50 milliGRAM(s) Oral every 4 hours PRN Severe Pain (7 - 10)      PAST MEDICAL & SURGICAL HISTORY:  Migraines  Hearing loss: bilateral  Snoring: ZE precautions -- responds affirmatively to STOP BANG questionnaire  Infection: 2010 staph infection in the blood -- had PICC line  Nephrolithiasis  Kidney mass: bilateral -- operating on the left kidney mass on 5-15-19  Hypertension  Anxiety  Hypothyroid  Obesity  Ankle Fracture: left  Old Disrupted ACL (Anterior Cruciate Ligament): left  Herniated Disc: with nerve damage  Arthropathy: right shoulder surgery  Snoring: UPPP in approximately 2016  Knee arthropathy: left ACL in 2010  S/P lumbar fusion: double fusion 2010  S/P Injection of Intervertebral Space for Herniated Disc: 2004  [x ] Reviewed     SOCIAL HISTORY:  Residence: [ ] [facility]  [ ] SNF  [x ] Community  [ ] Substance abuse: denies  [ ] Tobacco: denies  [ ] Alcohol use: denies    FAMILY HISTORY:  No pertinent family history in first degree relatives  [ ] No pertinent family history in first degree relatives     REVIEW OF SYSTEMS:    CONSTITUTIONAL: No fever, weight loss, or fatigue  EYES: No eye pain, visual disturbances, or discharge  ENMT:  No difficulty hearing, tinnitus, vertigo; No sinus or throat pain  NECK: No pain or stiffness  BREASTS: No pain, masses, or nipple discharge  RESPIRATORY: No cough, wheezing, chills or hemoptysis; No shortness of breath  CARDIOVASCULAR: No chest pain, palpitations, dizziness, or leg swelling  GASTROINTESTINAL: (+) mild abdominal pain. No nausea, vomiting, or hematemesis; No diarrhea or constipation. No melena or hematochezia. (+) flatus  GENITOURINARY: (+) dawkins  NEUROLOGICAL: No headaches, memory loss, loss of strength, numbness, or tremors  SKIN: No itching, burning, rashes, or lesions   LYMPH NODES: No enlarged glands  ENDOCRINE: No heat or cold intolerance; No hair loss  MUSCULOSKELETAL: No muscle or back pain, (+) shoulder pain  PSYCHIATRIC: No depression, (+) anxiety, no mood swings, or difficulty sleeping  HEME/LYMPH: No easy bruising, or bleeding gums  ALLERGY AND IMMUNOLOGIC: No hives or eczema    [  ] All other ROS negative  [  ] Unable to obtain due to poor mental status    Vital Signs Last 24 Hrs  T(C): 36.9 (16 May 2019 14:06), Max: 36.9 (16 May 2019 14:06)  T(F): 98.5 (16 May 2019 14:06), Max: 98.5 (16 May 2019 14:06)  HR: 73 (16 May 2019 14:06) (58 - 87)  BP: 100/59 (16 May 2019 14:06) (100/59 - 142/78)  BP(mean): 84 (15 May 2019 22:45) (76 - 106)  RR: 18 (16 May 2019 14:06) (13 - 21)  SpO2: 97% (16 May 2019 14:06) (93% - 100%)    PHYSICAL EXAM:    GENERAL: NAD, well-groomed, well-developed, anxious  HEAD:  Atraumatic, Normocephalic  EYES: EOMI, PERRLA, conjunctiva and sclera clear  ENMT: Moist mucous membranes  NECK: Supple, No JVD  RESPIRATORY: Clear to auscultation bilaterally; No rales, rhonchi, wheezing, or rubs  CARDIOVASCULAR: Regular rate and rhythm; No murmurs, rubs, or gallops  GASTROINTESTINAL: Soft, mildly tender, Nondistended; Bowel sounds present  GENITOURINARY: Not examined  EXTREMITIES:  2+ Peripheral Pulses, No clubbing, cyanosis, or edema  NERVOUS SYSTEM:  Alert & Oriented X3; Moving all 4 extremities; No gross sensory deficits  HEME/LYMPH: No lymphadenopathy noted  SKIN: No rashes or lesions; Incisions C/D/I    LABS:                        11.1   16.00 )-----------( 259      ( 16 May 2019 06:56 )             34.1     05-16    139  |  105  |  14  ----------------------------<  119<H>  4.2   |  23  |  1.02    Ca    8.3<L>      16 May 2019 06:56          CAPILLARY BLOOD GLUCOSE          RADIOLOGY & ADDITIONAL STUDIES:    EKG:   Personally Reviewed:  [ ] YES     Imaging:   Personally Reviewed:  [ ] YES               Consultant(s) notes reviewed:    Care Discussed with Consultant(s)/Other Providers:    Advanced Directives: [ ] DNR  [ ] No feeding tube  [ ] MOLST in chart  [ ] MOLST completed today  [ x] Unknown Patient is a 48y old  Male who presents with a chief complaint of renal mass      HPI:  This is a 47 y/o male with h/o HTN, [place], herniated disc, low back pain, anxiety admitted for lap left partial nephrectomy. S/P OR, POD#1.  Only c/o anxiety and should pain. Denies any CP or SOB, pt had a panic attack last night, relieved by Xanx. (+) flatus this morning.     History limited due to: [ ] Dementia  [ ] Delirium  [ ] Condition    Pain Symptoms if applicable:             	                         	   mild         Pain:	                            	    1-3	       Location:	abd and shoulder  Modifying factors:	  Associated symptoms:	    Function: [x ] Independent  [ ] Assistance  [ ] Total care  [ ] Non-ambulatory    Allergies    No Known Allergies    Intolerances        HOME MEDICATIONS: [x ] Reviewed    MEDICATIONS  (STANDING):  acetaminophen  IVPB .. 1000 milliGRAM(s) IV Intermittent once  dextrose 5% + sodium chloride 0.45%. 1000 milliLiter(s) (75 mL/Hr) IV Continuous <Continuous>  docusate sodium 100 milliGRAM(s) Oral two times a day  heparin  Injectable 5000 Unit(s) SubCutaneous every 8 hours  ketorolac   Injectable 15 milliGRAM(s) IV Push every 6 hours  levothyroxine 100 MICROGram(s) Oral daily  metoprolol succinate ER 25 milliGRAM(s) Oral daily  senna 2 Tablet(s) Oral at bedtime    MEDICATIONS  (PRN):  ALPRAZolam 1 milliGRAM(s) Oral every 6 hours PRN Anxiety  ondansetron Injectable 4 milliGRAM(s) IV Push every 6 hours PRN Nausea and/or Vomiting  traMADol 25 milliGRAM(s) Oral every 4 hours PRN Moderate Pain (4 - 6)  traMADol 50 milliGRAM(s) Oral every 4 hours PRN Severe Pain (7 - 10)      PAST MEDICAL & SURGICAL HISTORY:  Migraines  Hearing loss: bilateral  Snoring: ZE precautions -- responds affirmatively to STOP BANG questionnaire  Infection: 2010 staph infection in the blood -- had PICC line  Nephrolithiasis  Kidney mass: bilateral -- operating on the left kidney mass on 5-15-19  Hypertension  Anxiety  Hypothyroid  Obesity  Ankle Fracture: left  Old Disrupted ACL (Anterior Cruciate Ligament): left  Herniated Disc: with nerve damage  Arthropathy: right shoulder surgery  Snoring: UPPP in approximately 2016  Knee arthropathy: left ACL in 2010  S/P lumbar fusion: double fusion 2010  S/P Injection of Intervertebral Space for Herniated Disc: 2004  [x ] Reviewed     SOCIAL HISTORY:  Residence: [ ] detention  [ ] SNF  [x ] Community  [ ] Substance abuse: denies  [ ] Tobacco: denies  [ ] Alcohol use: denies    FAMILY HISTORY:  No pertinent family history in first degree relatives  [ ] No pertinent family history in first degree relatives     REVIEW OF SYSTEMS:    CONSTITUTIONAL: No fever, weight loss, or fatigue  EYES: No eye pain, visual disturbances, or discharge  ENMT:  No difficulty hearing, tinnitus, vertigo; No sinus or throat pain  NECK: No pain or stiffness  BREASTS: No pain, masses, or nipple discharge  RESPIRATORY: No cough, wheezing, chills or hemoptysis; No shortness of breath  CARDIOVASCULAR: No chest pain, palpitations, dizziness, or leg swelling  GASTROINTESTINAL: (+) mild abdominal pain. No nausea, vomiting, or hematemesis; No diarrhea or constipation. No melena or hematochezia. (+) flatus  GENITOURINARY: (+) dawkins  NEUROLOGICAL: No headaches, memory loss, loss of strength, numbness, or tremors  SKIN: No itching, burning, rashes, or lesions   LYMPH NODES: No enlarged glands  ENDOCRINE: No heat or cold intolerance; No hair loss  MUSCULOSKELETAL: No muscle or back pain, (+) shoulder pain  PSYCHIATRIC: No depression, (+) anxiety, no mood swings, or difficulty sleeping  HEME/LYMPH: No easy bruising, or bleeding gums  ALLERGY AND IMMUNOLOGIC: No hives or eczema    [  ] All other ROS negative  [  ] Unable to obtain due to poor mental status    Vital Signs Last 24 Hrs  T(C): 36.9 (16 May 2019 14:06), Max: 36.9 (16 May 2019 14:06)  T(F): 98.5 (16 May 2019 14:06), Max: 98.5 (16 May 2019 14:06)  HR: 73 (16 May 2019 14:06) (58 - 87)  BP: 100/59 (16 May 2019 14:06) (100/59 - 142/78)  BP(mean): 84 (15 May 2019 22:45) (76 - 106)  RR: 18 (16 May 2019 14:06) (13 - 21)  SpO2: 97% (16 May 2019 14:06) (93% - 100%)    PHYSICAL EXAM:    GENERAL: NAD, well-groomed, well-developed, anxious  HEAD:  Atraumatic, Normocephalic  EYES: EOMI, PERRLA, conjunctiva and sclera clear  ENMT: Moist mucous membranes  NECK: Supple, No JVD  RESPIRATORY: Clear to auscultation bilaterally; No rales, rhonchi, wheezing, or rubs  CARDIOVASCULAR: Regular rate and rhythm; No murmurs, rubs, or gallops  GASTROINTESTINAL: Soft, mildly tender, Nondistended; Bowel sounds present  GENITOURINARY: Not examined  EXTREMITIES:  2+ Peripheral Pulses, No clubbing, cyanosis, or edema  NERVOUS SYSTEM:  Alert & Oriented X3; Moving all 4 extremities; No gross sensory deficits  HEME/LYMPH: No lymphadenopathy noted  SKIN: No rashes or lesions; Incisions C/D/I    LABS:                        11.1   16.00 )-----------( 259      ( 16 May 2019 06:56 )             34.1     05-16    139  |  105  |  14  ----------------------------<  119<H>  4.2   |  23  |  1.02    Ca    8.3<L>      16 May 2019 06:56          CAPILLARY BLOOD GLUCOSE          RADIOLOGY & ADDITIONAL STUDIES:    EKG:   Personally Reviewed:  [x ] YES Sinus luis at 58, no ST, TW changes    Imaging:   Personally Reviewed:  [ ] YES               Consultant(s) notes reviewed:    Care Discussed with Consultant(s)/Other Providers:    Advanced Directives: [ ] DNR  [ ] No feeding tube  [ ] MOLST in chart  [ ] MOLST completed today  [ x] Unknown

## 2019-05-16 NOTE — PATIENT PROFILE ADULT - HARM RISK FACTORS
[FreeTextEntry1] : 19 mo here with right acute otitis media and bronchiolitis \par No respiratory distress, given one neb in office for wheezing and patient had a good response \par Mother educated re: neb setup, to continue every 4-6 hours \par Augmentin given recent hx of otitis BID x 10 days \par Supportive measures for upper respiratory infection were discussed. These measures including placing a humidifier in the room where the child sleeps, use nasal saline and suction as needed to clear the nasal passages and ensure adequate hydration. Tylenol can be used every 4 hours as needed for fever or pain and Motrin can be used every 6 hours as needed for fever or pain.\par Return on Thursday for recheck, sooner PRN \par Follow up PRN worsening symptoms, persistent fever of 100.4 or more or failure to improve.\par 
yes

## 2019-05-16 NOTE — CONSULT NOTE ADULT - PROBLEM SELECTOR RECOMMENDATION 4
Pt has been anxious, had one episode of panic attack last night.   -Xanax 1mg PO Q6H PRN for anxiety Cont. Levothyroxine

## 2019-05-16 NOTE — CONSULT NOTE ADULT - PROBLEM SELECTOR RECOMMENDATION 9
S/P left partial nephrectomy, POD#1.  -Management as per   -Pain control  -Bowel regimen  -Incentive spirometer  -OOB and ambulate  -DVT prophylaxis

## 2019-05-17 LAB
ANION GAP SERPL CALC-SCNC: 8 MMO/L — SIGNIFICANT CHANGE UP (ref 7–14)
BUN SERPL-MCNC: 12 MG/DL — SIGNIFICANT CHANGE UP (ref 7–23)
CALCIUM SERPL-MCNC: 8.2 MG/DL — LOW (ref 8.4–10.5)
CHLORIDE SERPL-SCNC: 106 MMOL/L — SIGNIFICANT CHANGE UP (ref 98–107)
CO2 SERPL-SCNC: 24 MMOL/L — SIGNIFICANT CHANGE UP (ref 22–31)
CREAT FLD-MCNC: 1.13 MG/DL — SIGNIFICANT CHANGE UP
CREAT SERPL-MCNC: 1.09 MG/DL — SIGNIFICANT CHANGE UP (ref 0.5–1.3)
GLUCOSE SERPL-MCNC: 109 MG/DL — HIGH (ref 70–99)
POTASSIUM SERPL-MCNC: 4.2 MMOL/L — SIGNIFICANT CHANGE UP (ref 3.5–5.3)
POTASSIUM SERPL-SCNC: 4.2 MMOL/L — SIGNIFICANT CHANGE UP (ref 3.5–5.3)
SODIUM SERPL-SCNC: 138 MMOL/L — SIGNIFICANT CHANGE UP (ref 135–145)

## 2019-05-17 PROCEDURE — 99233 SBSQ HOSP IP/OBS HIGH 50: CPT

## 2019-05-17 RX ORDER — MAGNESIUM HYDROXIDE 400 MG/1
30 TABLET, CHEWABLE ORAL DAILY
Refills: 0 | Status: DISCONTINUED | OUTPATIENT
Start: 2019-05-17 | End: 2019-05-19

## 2019-05-17 RX ORDER — ACETAMINOPHEN 500 MG
650 TABLET ORAL EVERY 6 HOURS
Refills: 0 | Status: DISCONTINUED | OUTPATIENT
Start: 2019-05-17 | End: 2019-05-18

## 2019-05-17 RX ADMIN — HEPARIN SODIUM 5000 UNIT(S): 5000 INJECTION INTRAVENOUS; SUBCUTANEOUS at 21:40

## 2019-05-17 RX ADMIN — TRAMADOL HYDROCHLORIDE 50 MILLIGRAM(S): 50 TABLET ORAL at 02:00

## 2019-05-17 RX ADMIN — Medication 100 MILLIGRAM(S): at 05:12

## 2019-05-17 RX ADMIN — Medication 15 MILLIGRAM(S): at 11:19

## 2019-05-17 RX ADMIN — TRAMADOL HYDROCHLORIDE 50 MILLIGRAM(S): 50 TABLET ORAL at 05:40

## 2019-05-17 RX ADMIN — CYCLOBENZAPRINE HYDROCHLORIDE 5 MILLIGRAM(S): 10 TABLET, FILM COATED ORAL at 04:41

## 2019-05-17 RX ADMIN — HEPARIN SODIUM 5000 UNIT(S): 5000 INJECTION INTRAVENOUS; SUBCUTANEOUS at 05:12

## 2019-05-17 RX ADMIN — HEPARIN SODIUM 5000 UNIT(S): 5000 INJECTION INTRAVENOUS; SUBCUTANEOUS at 13:18

## 2019-05-17 RX ADMIN — Medication 100 MILLIGRAM(S): at 16:50

## 2019-05-17 RX ADMIN — SENNA PLUS 2 TABLET(S): 8.6 TABLET ORAL at 21:40

## 2019-05-17 RX ADMIN — Medication 1 MILLIGRAM(S): at 03:21

## 2019-05-17 RX ADMIN — TRAMADOL HYDROCHLORIDE 50 MILLIGRAM(S): 50 TABLET ORAL at 06:40

## 2019-05-17 RX ADMIN — TRAMADOL HYDROCHLORIDE 50 MILLIGRAM(S): 50 TABLET ORAL at 20:22

## 2019-05-17 RX ADMIN — MAGNESIUM HYDROXIDE 30 MILLILITER(S): 400 TABLET, CHEWABLE ORAL at 13:18

## 2019-05-17 RX ADMIN — Medication 15 MILLIGRAM(S): at 05:12

## 2019-05-17 RX ADMIN — Medication 1 ENEMA: at 15:55

## 2019-05-17 RX ADMIN — Medication 1 MILLIGRAM(S): at 12:14

## 2019-05-17 RX ADMIN — Medication 10 MILLIGRAM(S): at 15:11

## 2019-05-17 RX ADMIN — Medication 15 MILLIGRAM(S): at 16:46

## 2019-05-17 RX ADMIN — TRAMADOL HYDROCHLORIDE 50 MILLIGRAM(S): 50 TABLET ORAL at 12:00

## 2019-05-17 RX ADMIN — TRAMADOL HYDROCHLORIDE 50 MILLIGRAM(S): 50 TABLET ORAL at 01:23

## 2019-05-17 RX ADMIN — CYCLOBENZAPRINE HYDROCHLORIDE 5 MILLIGRAM(S): 10 TABLET, FILM COATED ORAL at 16:46

## 2019-05-17 RX ADMIN — CYCLOBENZAPRINE HYDROCHLORIDE 5 MILLIGRAM(S): 10 TABLET, FILM COATED ORAL at 21:40

## 2019-05-17 RX ADMIN — TRAMADOL HYDROCHLORIDE 50 MILLIGRAM(S): 50 TABLET ORAL at 21:10

## 2019-05-17 RX ADMIN — Medication 100 MICROGRAM(S): at 05:12

## 2019-05-17 RX ADMIN — Medication 1 MILLIGRAM(S): at 20:22

## 2019-05-17 RX ADMIN — Medication 25 MILLIGRAM(S): at 05:12

## 2019-05-17 RX ADMIN — TRAMADOL HYDROCHLORIDE 50 MILLIGRAM(S): 50 TABLET ORAL at 11:18

## 2019-05-18 DIAGNOSIS — R50.82 POSTPROCEDURAL FEVER: ICD-10-CM

## 2019-05-18 LAB
HCT VFR BLD CALC: 30.6 % — LOW (ref 39–50)
HGB BLD-MCNC: 10 G/DL — LOW (ref 13–17)
MCHC RBC-ENTMCNC: 29.4 PG — SIGNIFICANT CHANGE UP (ref 27–34)
MCHC RBC-ENTMCNC: 32.7 % — SIGNIFICANT CHANGE UP (ref 32–36)
MCV RBC AUTO: 90 FL — SIGNIFICANT CHANGE UP (ref 80–100)
NRBC # FLD: 0 K/UL — SIGNIFICANT CHANGE UP (ref 0–0)
PLATELET # BLD AUTO: 206 K/UL — SIGNIFICANT CHANGE UP (ref 150–400)
PMV BLD: 10.5 FL — SIGNIFICANT CHANGE UP (ref 7–13)
RBC # BLD: 3.4 M/UL — LOW (ref 4.2–5.8)
RBC # FLD: 12.9 % — SIGNIFICANT CHANGE UP (ref 10.3–14.5)
WBC # BLD: 12.33 K/UL — HIGH (ref 3.8–10.5)
WBC # FLD AUTO: 12.33 K/UL — HIGH (ref 3.8–10.5)

## 2019-05-18 PROCEDURE — 99233 SBSQ HOSP IP/OBS HIGH 50: CPT

## 2019-05-18 PROCEDURE — 71045 X-RAY EXAM CHEST 1 VIEW: CPT | Mod: 26

## 2019-05-18 RX ORDER — OXYCODONE HYDROCHLORIDE 5 MG/1
10 TABLET ORAL
Refills: 0 | Status: DISCONTINUED | OUTPATIENT
Start: 2019-05-18 | End: 2019-05-18

## 2019-05-18 RX ORDER — IBUPROFEN 200 MG
600 TABLET ORAL EVERY 6 HOURS
Refills: 0 | Status: DISCONTINUED | OUTPATIENT
Start: 2019-05-18 | End: 2019-05-19

## 2019-05-18 RX ORDER — KETOROLAC TROMETHAMINE 30 MG/ML
15 SYRINGE (ML) INJECTION ONCE
Refills: 0 | Status: DISCONTINUED | OUTPATIENT
Start: 2019-05-18 | End: 2019-05-18

## 2019-05-18 RX ORDER — ACETAMINOPHEN 500 MG
1000 TABLET ORAL ONCE
Refills: 0 | Status: COMPLETED | OUTPATIENT
Start: 2019-05-18 | End: 2019-05-18

## 2019-05-18 RX ORDER — ACETAMINOPHEN 500 MG
975 TABLET ORAL EVERY 6 HOURS
Refills: 0 | Status: DISCONTINUED | OUTPATIENT
Start: 2019-05-18 | End: 2019-05-19

## 2019-05-18 RX ORDER — OXYCODONE HYDROCHLORIDE 5 MG/1
5 TABLET ORAL EVERY 4 HOURS
Refills: 0 | Status: DISCONTINUED | OUTPATIENT
Start: 2019-05-18 | End: 2019-05-19

## 2019-05-18 RX ORDER — OXYCODONE HYDROCHLORIDE 5 MG/1
5 TABLET ORAL
Refills: 0 | Status: DISCONTINUED | OUTPATIENT
Start: 2019-05-18 | End: 2019-05-18

## 2019-05-18 RX ADMIN — Medication 100 MILLIGRAM(S): at 06:16

## 2019-05-18 RX ADMIN — Medication 100 MICROGRAM(S): at 06:16

## 2019-05-18 RX ADMIN — HEPARIN SODIUM 5000 UNIT(S): 5000 INJECTION INTRAVENOUS; SUBCUTANEOUS at 14:05

## 2019-05-18 RX ADMIN — SENNA PLUS 2 TABLET(S): 8.6 TABLET ORAL at 22:07

## 2019-05-18 RX ADMIN — Medication 1 MILLIGRAM(S): at 15:24

## 2019-05-18 RX ADMIN — HEPARIN SODIUM 5000 UNIT(S): 5000 INJECTION INTRAVENOUS; SUBCUTANEOUS at 22:07

## 2019-05-18 RX ADMIN — Medication 15 MILLIGRAM(S): at 19:57

## 2019-05-18 RX ADMIN — Medication 100 MILLIGRAM(S): at 19:05

## 2019-05-18 RX ADMIN — CYCLOBENZAPRINE HYDROCHLORIDE 5 MILLIGRAM(S): 10 TABLET, FILM COATED ORAL at 22:07

## 2019-05-18 RX ADMIN — OXYCODONE HYDROCHLORIDE 10 MILLIGRAM(S): 5 TABLET ORAL at 15:22

## 2019-05-18 RX ADMIN — TRAMADOL HYDROCHLORIDE 50 MILLIGRAM(S): 50 TABLET ORAL at 07:02

## 2019-05-18 RX ADMIN — TRAMADOL HYDROCHLORIDE 50 MILLIGRAM(S): 50 TABLET ORAL at 07:45

## 2019-05-18 RX ADMIN — OXYCODONE HYDROCHLORIDE 10 MILLIGRAM(S): 5 TABLET ORAL at 21:12

## 2019-05-18 RX ADMIN — OXYCODONE HYDROCHLORIDE 5 MILLIGRAM(S): 5 TABLET ORAL at 23:33

## 2019-05-18 RX ADMIN — OXYCODONE HYDROCHLORIDE 10 MILLIGRAM(S): 5 TABLET ORAL at 14:04

## 2019-05-18 RX ADMIN — Medication 15 MILLIGRAM(S): at 09:29

## 2019-05-18 RX ADMIN — Medication 1 MILLIGRAM(S): at 08:12

## 2019-05-18 RX ADMIN — HEPARIN SODIUM 5000 UNIT(S): 5000 INJECTION INTRAVENOUS; SUBCUTANEOUS at 06:16

## 2019-05-18 RX ADMIN — Medication 15 MILLIGRAM(S): at 19:05

## 2019-05-18 RX ADMIN — Medication 15 MILLIGRAM(S): at 10:01

## 2019-05-18 RX ADMIN — Medication 25 MILLIGRAM(S): at 06:15

## 2019-05-18 RX ADMIN — Medication 600 MILLIGRAM(S): at 22:07

## 2019-05-18 RX ADMIN — OXYCODONE HYDROCHLORIDE 10 MILLIGRAM(S): 5 TABLET ORAL at 20:37

## 2019-05-18 RX ADMIN — CYCLOBENZAPRINE HYDROCHLORIDE 5 MILLIGRAM(S): 10 TABLET, FILM COATED ORAL at 07:02

## 2019-05-18 RX ADMIN — Medication 1 MILLIGRAM(S): at 22:08

## 2019-05-18 RX ADMIN — Medication 600 MILLIGRAM(S): at 22:49

## 2019-05-18 RX ADMIN — Medication 400 MILLIGRAM(S): at 08:08

## 2019-05-18 NOTE — PROVIDER CONTACT NOTE (OTHER) - ASSESSMENT
Pt c/o abd pain 8/10 extending from suprapubic to mid abd. Describes pain as sharp. Pt is noted to be diaphoretic. All VS stable: Temp 98.5 deg F (oral), HR 78 (electronic), /73 (electronic), O2 sat 98% on room air. Pt c/o abd pain 8/10 extending from suprapubic to mid abd. Describes pain as sharp. Pt is noted to be diaphoretic. All VS stable: Temp 98.5 deg F (oral), HR 78 (electronic), /73 (electronic), O2 sat 98% on room air. Pt states that he is passing flatus and had "large, loose" BM this morning. Abd is softly distended, tender to palpation, BS hypoactive. LLQ MANNY in place w/small serosang drainage. No pain meds due at this time; pt previously given Tramadol and IV Tylenol.

## 2019-05-19 ENCOUNTER — TRANSCRIPTION ENCOUNTER (OUTPATIENT)
Age: 49
End: 2019-05-19

## 2019-05-19 VITALS
TEMPERATURE: 99 F | RESPIRATION RATE: 16 BRPM | SYSTOLIC BLOOD PRESSURE: 118 MMHG | DIASTOLIC BLOOD PRESSURE: 72 MMHG | HEART RATE: 80 BPM | OXYGEN SATURATION: 99 %

## 2019-05-19 LAB
SPECIMEN SOURCE: SIGNIFICANT CHANGE UP
SPECIMEN SOURCE: SIGNIFICANT CHANGE UP

## 2019-05-19 PROCEDURE — 99233 SBSQ HOSP IP/OBS HIGH 50: CPT

## 2019-05-19 RX ORDER — IBUPROFEN 200 MG
1 TABLET ORAL
Qty: 0 | Refills: 0 | DISCHARGE
Start: 2019-05-19

## 2019-05-19 RX ORDER — OXYCODONE HYDROCHLORIDE 5 MG/1
1 TABLET ORAL
Qty: 8 | Refills: 0
Start: 2019-05-19

## 2019-05-19 RX ORDER — OXYCODONE HYDROCHLORIDE 5 MG/1
1 TABLET ORAL
Qty: 10 | Refills: 0
Start: 2019-05-19

## 2019-05-19 RX ORDER — CYCLOBENZAPRINE HYDROCHLORIDE 10 MG/1
1 TABLET, FILM COATED ORAL
Qty: 10 | Refills: 0
Start: 2019-05-19

## 2019-05-19 RX ORDER — DOCUSATE SODIUM 100 MG
1 CAPSULE ORAL
Qty: 0 | Refills: 0 | DISCHARGE
Start: 2019-05-19

## 2019-05-19 RX ORDER — ACETAMINOPHEN 500 MG
3 TABLET ORAL
Qty: 0 | Refills: 0 | DISCHARGE
Start: 2019-05-19

## 2019-05-19 RX ORDER — SENNA PLUS 8.6 MG/1
2 TABLET ORAL
Qty: 0 | Refills: 0 | DISCHARGE
Start: 2019-05-19

## 2019-05-19 RX ADMIN — Medication 25 MILLIGRAM(S): at 05:27

## 2019-05-19 RX ADMIN — OXYCODONE HYDROCHLORIDE 5 MILLIGRAM(S): 5 TABLET ORAL at 10:40

## 2019-05-19 RX ADMIN — Medication 975 MILLIGRAM(S): at 02:17

## 2019-05-19 RX ADMIN — OXYCODONE HYDROCHLORIDE 5 MILLIGRAM(S): 5 TABLET ORAL at 05:28

## 2019-05-19 RX ADMIN — OXYCODONE HYDROCHLORIDE 5 MILLIGRAM(S): 5 TABLET ORAL at 00:05

## 2019-05-19 RX ADMIN — CYCLOBENZAPRINE HYDROCHLORIDE 5 MILLIGRAM(S): 10 TABLET, FILM COATED ORAL at 13:20

## 2019-05-19 RX ADMIN — Medication 975 MILLIGRAM(S): at 01:30

## 2019-05-19 RX ADMIN — Medication 100 MICROGRAM(S): at 05:27

## 2019-05-19 RX ADMIN — Medication 1 MILLIGRAM(S): at 12:17

## 2019-05-19 RX ADMIN — Medication 100 MILLIGRAM(S): at 05:27

## 2019-05-19 RX ADMIN — Medication 1 MILLIGRAM(S): at 05:28

## 2019-05-19 RX ADMIN — HEPARIN SODIUM 5000 UNIT(S): 5000 INJECTION INTRAVENOUS; SUBCUTANEOUS at 05:28

## 2019-05-19 RX ADMIN — HEPARIN SODIUM 5000 UNIT(S): 5000 INJECTION INTRAVENOUS; SUBCUTANEOUS at 13:21

## 2019-05-19 RX ADMIN — OXYCODONE HYDROCHLORIDE 5 MILLIGRAM(S): 5 TABLET ORAL at 06:10

## 2019-05-19 RX ADMIN — OXYCODONE HYDROCHLORIDE 5 MILLIGRAM(S): 5 TABLET ORAL at 10:00

## 2019-05-19 RX ADMIN — OXYCODONE HYDROCHLORIDE 5 MILLIGRAM(S): 5 TABLET ORAL at 15:07

## 2019-05-19 RX ADMIN — CYCLOBENZAPRINE HYDROCHLORIDE 5 MILLIGRAM(S): 10 TABLET, FILM COATED ORAL at 05:27

## 2019-05-19 NOTE — PROGRESS NOTE ADULT - SUBJECTIVE AND OBJECTIVE BOX
Shawna Locke MD  Pager [phone]    CHIEF COMPLAINT: Patient is a 48y old  male who presents with a chief complaint of renal mass (15 May 2019 23:03)      SUBJECTIVE / OVERNIGHT EVENTS:  pt passed flatus and had BM, c/o incisional pain LLQ and scrotal/penile swelling and difficulty urinating, denies chest pain/sob; he had fever 101.4F last night, cultured and CXR sent    MEDICATIONS  (STANDING):  docusate sodium 100 milliGRAM(s) Oral two times a day  heparin  Injectable 5000 Unit(s) SubCutaneous every 8 hours  ketorolac   Injectable 15 milliGRAM(s) IV Push once  levothyroxine 100 MICROGram(s) Oral daily  metoprolol succinate ER 25 milliGRAM(s) Oral daily  senna 2 Tablet(s) Oral at bedtime    MEDICATIONS  (PRN):  acetaminophen   Tablet .. 650 milliGRAM(s) Oral every 6 hours PRN Temp greater or equal to 38C (100.4F), Mild Pain (1 - 3)  ALPRAZolam 1 milliGRAM(s) Oral every 6 hours PRN Anxiety  cyclobenzaprine 5 milliGRAM(s) Oral three times a day PRN Muscle Spasm  magnesium hydroxide Suspension 30 milliLiter(s) Oral daily PRN Constipation  ondansetron Injectable 4 milliGRAM(s) IV Push every 6 hours PRN Nausea and/or Vomiting  oxyCODONE    IR 5 milliGRAM(s) Oral every 3 hours PRN Moderate Pain (4 - 6)  oxyCODONE    IR 10 milliGRAM(s) Oral every 3 hours PRN Severe Pain (7 - 10)      VITALS:  T(F): 98.5 (05-18-19 @ 08:49), Max: 101.4 (05-18-19 @ 01:19)  HR: 78 (05-18-19 @ 08:49) (71 - 86)  BP: 127/73 (05-18-19 @ 08:49) (119/64 - 145/78)  RR: 19 (05-18-19 @ 08:49) (17 - 19)  SpO2: 98% (05-18-19 @ 08:49)      CAPILLARY BLOOD GLUCOSE    Output     I&O's Summary  T(F): 98.5 (05-18-19 @ 08:49), Max: 101.4 (05-18-19 @ 01:19)  HR: 78 (05-18-19 @ 08:49) (71 - 86)  BP: 127/73 (05-18-19 @ 08:49) (119/64 - 145/78)  RR: 19 (05-18-19 @ 08:49) (17 - 19)  SpO2: 98% (05-18-19 @ 08:49)    PHYSICAL EXAM:  GENERAL: NAD, well-developed  HEAD:  Atraumatic, Normocephalic  EYES: EOMI, PERRLA, conjunctiva and sclera clear  NECK: Supple, No JVD  CHEST/LUNG: Clear to auscultation bilaterally; No wheeze  HEART: Regular rate and rhythm; No murmurs, rubs, or gallops  ABDOMEN: Soft, incisional tenderness, Nondistended; Bowel sounds present   penile/scrotal edema/erythema  EXTREMITIES:  2+ Peripheral Pulses, No clubbing, cyanosis, or edema  PSYCH: AAOx3  NEUROLOGY: non-focal  SKIN: No rashes or lesions    LABS:              10.0                 x    | x    | x            12.33 >-----------< 206     ------------------------< x                     30.6                 x    | x    | x                                            Ca x     Mg x     Ph x          MICROBIOLOGY:    RADIOLOGY & ADDITIONAL TESTS:    Imaging Personally Reviewed:    [ ] Consultant(s) Notes Reviewed:  [x ] Care Discussed with Consultants/Other Providers: urology JORGE L [place], pt spiked fever last night, cultured and CXR
 Note    Post op Check    s/p : L lap part nephx    Pt seen / examined without complaints pain controlled    Vital Signs Last 24 Hrs  T(C): 36.4 (15 May 2019 22:00), Max: 36.6 (15 May 2019 10:16)  T(F): 97.6 (15 May 2019 22:00), Max: 97.9 (15 May 2019 10:16)  HR: 78 (15 May 2019 22:45) (58 - 87)  BP: 128/73 (15 May 2019 22:45) (99/77 - 142/78)  BP(mean): 84 (15 May 2019 22:45) (76 - 106)  RR: 17 (15 May 2019 22:45) (13 - 21)  SpO2: 99% (15 May 2019 22:45) (93% - 100%)    I&O's Summary    15 May 2019 07:01  -  15 May 2019 23:06  --------------------------------------------------------  IN: 500 mL / OUT: 240 mL / NET: 260 mL    ZNE=006  Veb=861  UJ=828    PHYSICAL EXAM:       Constitutional: awake alert NAD    Respiratory: no resp distress    Cardiovascular: RRR    Gastrointestinal: softly distended trocar sites CDI and appropriately tender    Genitourinary: dawkins in place draining well    Extremities: + venodynes                     12.3   18.27 )-----------( 289      ( 15 May 2019 19:20 )             38.3       05-15    140  |  106  |  13  ----------------------------<  145<H>  4.5   |  21<L>  |  0.93    Ca    8.4      15 May 2019 19:20
ANESTHESIA POSTOP CHECK    48y Male POSTOP DAY 1 S/P   [x ] General Anesthesia  [ ] Thomas Anesthesia  [ ] MAC    Vital Signs Last 24 Hrs  T(C): 36.6 (16 May 2019 09:18), Max: 36.6 (15 May 2019 23:39)  T(F): 97.9 (16 May 2019 09:18), Max: 97.9 (16 May 2019 09:18)  HR: 65 (16 May 2019 09:18) (58 - 87)  BP: 114/61 (16 May 2019 09:18) (114/61 - 142/78)  BP(mean): 84 (15 May 2019 22:45) (76 - 106)  RR: 18 (16 May 2019 09:18) (13 - 21)  SpO2: 99% (16 May 2019 09:18) (93% - 100%)  I&O's Summary    15 May 2019 07:01  -  16 May 2019 07:00  --------------------------------------------------------  IN: 500 mL / OUT: 285 mL / NET: 215 mL    16 May 2019 07:01  -  16 May 2019 10:35  --------------------------------------------------------  IN: 0 mL / OUT: 400 mL / NET: -400 mL        [x ] NO APPARENT ANESTHESIA COMPLICATIONS      Comments:
Overnight events:  Pt febrile to 101.4 overnight with stable BP, labs, cultures and CXR ordered, 100.6 this am    Subjective:  Pt c/o incisional pain, no N/V + flatus, OOB    Objective:    Vital signs  T(C): , Max: 38.6 (05-18-19 @ 01:19)  HR: 86 (05-18-19 @ 06:14)  BP: 135/76 (05-18-19 @ 06:14)  SpO2: 97% (05-18-19 @ 06:14)  Wt(kg): --    Output   Void: 575  MANNY: 30  05-17 @ 07:01  -  05-18 @ 07:00  --------------------------------------------------------  IN: 0 mL / OUT: 1910 mL / NET: -1910 mL    05-18 @ 07:01  -  05-18 @ 08:15  --------------------------------------------------------  IN: 0 mL / OUT: 605 mL / NET: -605 mL        Gen: NAD  Abd: incisions c/d/i, softly distended, nontender  : edema of foreskin and scrotum    Labs                        10.0   12.33 )-----------( 206      ( 18 May 2019 02:10 )             30.6     17 May 2019 06:00    138    |  106    |  12     ----------------------------<  109    4.2     |  24     |  1.09     Ca    8.2        17 May 2019 06:00        Urine Cx: P  Blood Cx: P    Imaging: Pending official report
Patient is a 48y old  Male who presents with a chief complaint of renal mass (15 May 2019 23:03)      SUBJECTIVE / OVERNIGHT EVENTS: Feels better. denies any CP or SOB. shoulder  pain resolving with muscle relaxant.  (+) flatus and BM    MEDICATIONS  (STANDING):  docusate sodium 100 milliGRAM(s) Oral two times a day  heparin  Injectable 5000 Unit(s) SubCutaneous every 8 hours  ketorolac   Injectable 15 milliGRAM(s) IV Push every 6 hours  levothyroxine 100 MICROGram(s) Oral daily  metoprolol succinate ER 25 milliGRAM(s) Oral daily  senna 2 Tablet(s) Oral at bedtime    MEDICATIONS  (PRN):  acetaminophen   Tablet .. 650 milliGRAM(s) Oral every 6 hours PRN Temp greater or equal to 38C (100.4F), Mild Pain (1 - 3)  ALPRAZolam 1 milliGRAM(s) Oral every 6 hours PRN Anxiety  cyclobenzaprine 5 milliGRAM(s) Oral three times a day PRN Muscle Spasm  ondansetron Injectable 4 milliGRAM(s) IV Push every 6 hours PRN Nausea and/or Vomiting  traMADol 25 milliGRAM(s) Oral every 4 hours PRN Moderate Pain (4 - 6)  traMADol 50 milliGRAM(s) Oral every 4 hours PRN Severe Pain (7 - 10)      Vital Signs Last 24 Hrs  T(C): 37.2 (17 May 2019 09:11), Max: 37.7 (17 May 2019 05:10)  T(F): 98.9 (17 May 2019 09:11), Max: 99.8 (17 May 2019 05:10)  HR: 79 (17 May 2019 09:11) (63 - 87)  BP: 109/53 (17 May 2019 09:11) (99/57 - 116/64)  BP(mean): --  RR: 18 (17 May 2019 09:11) (16 - 18)  SpO2: 97% (17 May 2019 09:11) (97% - 100%)  CAPILLARY BLOOD GLUCOSE        I&O's Summary    16 May 2019 07:01  -  17 May 2019 07:00  --------------------------------------------------------  IN: 900 mL / OUT: 1790 mL / NET: -890 mL        PHYSICAL EXAM:  GENERAL: NAD, well-developed  HEAD:  Atraumatic, Normocephalic  EYES: EOMI, PERRLA, conjunctiva and sclera clear  NECK: Supple, No JVD  CHEST/LUNG: Clear to auscultation bilaterally; No wheeze  HEART: Regular rate and rhythm; No murmurs, rubs, or gallops  ABDOMEN: Soft, Nontender, Nondistended; Bowel sounds present  EXTREMITIES:  2+ Peripheral Pulses, No clubbing, cyanosis, or edema  PSYCH: AAOx3  NEUROLOGY: non-focal  SKIN: No rashes or lesions    LABS:                        11.1   16.00 )-----------( 259      ( 16 May 2019 06:56 )             34.1     05-17    138  |  106  |  12  ----------------------------<  109<H>  4.2   |  24  |  1.09    Ca    8.2<L>      17 May 2019 06:00                RADIOLOGY & ADDITIONAL TESTS:    Imaging Personally Reviewed:    Consultant(s) Notes Reviewed:      Care Discussed with Consultants/Other Providers:
Shawna Locke MD  Pager [phone]    CHIEF COMPLAINT: Patient is a 48y old  male who presents with a chief complaint of s/p surgery (18 May 2019 09:11)      SUBJECTIVE / OVERNIGHT EVENTS:  pt reports pain better controlled, scrotal/penile swelling/pain improved , afebrile yesterday and today    MEDICATIONS  (STANDING):  docusate sodium 100 milliGRAM(s) Oral two times a day  heparin  Injectable 5000 Unit(s) SubCutaneous every 8 hours  levothyroxine 100 MICROGram(s) Oral daily  metoprolol succinate ER 25 milliGRAM(s) Oral daily  senna 2 Tablet(s) Oral at bedtime    MEDICATIONS  (PRN):  acetaminophen   Tablet .. 975 milliGRAM(s) Oral every 6 hours PRN Mild Pain (1 - 3)  ALPRAZolam 1 milliGRAM(s) Oral every 6 hours PRN Anxiety  cyclobenzaprine 5 milliGRAM(s) Oral three times a day PRN Muscle Spasm  ibuprofen  Tablet. 600 milliGRAM(s) Oral every 6 hours PRN Moderate Pain (4 - 6)  magnesium hydroxide Suspension 30 milliLiter(s) Oral daily PRN Constipation  ondansetron Injectable 4 milliGRAM(s) IV Push every 6 hours PRN Nausea and/or Vomiting  oxyCODONE    IR 5 milliGRAM(s) Oral every 4 hours PRN Severe Pain (7 - 10)      VITALS:  T(F): 97.5 (05-19-19 @ 09:43), Max: 98.3 (05-18-19 @ 13:59)  HR: 74 (05-19-19 @ 09:43) (60 - 74)  BP: 112/70 (05-19-19 @ 09:43) (103/59 - 121/77)  RR: 18 (05-19-19 @ 09:43) (16 - 18)  SpO2: 98% (05-19-19 @ 09:43)      CAPILLARY BLOOD GLUCOSE    Output     I&O's Summary  T(F): 97.5 (05-19-19 @ 09:43), Max: 98.3 (05-18-19 @ 13:59)  HR: 74 (05-19-19 @ 09:43) (60 - 74)  BP: 112/70 (05-19-19 @ 09:43) (103/59 - 121/77)  RR: 18 (05-19-19 @ 09:43) (16 - 18)  SpO2: 98% (05-19-19 @ 09:43)    PHYSICAL EXAM:  GENERAL: NAD, well-developed  HEAD:  Atraumatic, Normocephalic  EYES: EOMI, PERRLA, conjunctiva and sclera clear  NECK: Supple, No JVD  CHEST/LUNG: Clear to auscultation bilaterally; No wheeze  HEART: Regular rate and rhythm; No murmurs, rubs, or gallops  ABDOMEN: Soft, mild incisional tenderness, Nondistended; Bowel sounds present   penile/scrotal edema/erythema resolving  EXTREMITIES:  2+ Peripheral Pulses, No clubbing, cyanosis, or edema  PSYCH: AAOx3  NEUROLOGY: non-focal  SKIN: No rashes or lesions    LABS:              10.0                 x    | x    | x            12.33 >-----------< 206     ------------------------< x                     30.6                 x    | x    | x                                            Ca x     Mg x     Ph x            MICROBIOLOGY:    Culture - Blood (collected 18 May 2019 06:42)  Source: BLOOD PERIPHERAL  Preliminary Report (19 May 2019 06:43):    NO ORGANISMS ISOLATED    NO ORGANISMS ISOLATED AT 24 HOURS    Culture - Blood (collected 18 May 2019 06:42)  Source: BLOOD VENOUS  Preliminary Report (19 May 2019 06:43):    NO ORGANISMS ISOLATED    NO ORGANISMS ISOLATED AT 24 HOURS      RADIOLOGY & ADDITIONAL TESTS:    Imaging Personally Reviewed:  < from: Xray Chest 1 View- PORTABLE-Urgent (05.18.19 @ 02:06) >  IMPRESSION:   Clear lungs.      [ ] Consultant(s) Notes Reviewed:  [ ] Care Discussed with Consultants/Other Providers:
UROLOGY DAILY PROGRESS NOTE:     Subjective:    Patient was seen and examined this morning during morning rounds. Pain is improved. Continues to have GI function and tolerate regular diet.    Objective:    NAD, awake and alert  Respirations nonlabored  Abdomen soft, mildly tender, mildly distended  No guarding or rebound tenderness   Incisions CDI  Genital swelling improving    MEDICATIONS  (STANDING):  docusate sodium 100 milliGRAM(s) Oral two times a day  heparin  Injectable 5000 Unit(s) SubCutaneous every 8 hours  levothyroxine 100 MICROGram(s) Oral daily  metoprolol succinate ER 25 milliGRAM(s) Oral daily  senna 2 Tablet(s) Oral at bedtime    MEDICATIONS  (PRN):  acetaminophen   Tablet .. 975 milliGRAM(s) Oral every 6 hours PRN Mild Pain (1 - 3)  ALPRAZolam 1 milliGRAM(s) Oral every 6 hours PRN Anxiety  cyclobenzaprine 5 milliGRAM(s) Oral three times a day PRN Muscle Spasm  ibuprofen  Tablet. 600 milliGRAM(s) Oral every 6 hours PRN Moderate Pain (4 - 6)  magnesium hydroxide Suspension 30 milliLiter(s) Oral daily PRN Constipation  ondansetron Injectable 4 milliGRAM(s) IV Push every 6 hours PRN Nausea and/or Vomiting  oxyCODONE    IR 5 milliGRAM(s) Oral every 4 hours PRN Severe Pain (7 - 10)      Vital Signs Last 24 Hrs  T(C): 36.4 (19 May 2019 05:26), Max: 36.9 (18 May 2019 08:49)  T(F): 97.5 (19 May 2019 05:26), Max: 98.5 (18 May 2019 08:49)  HR: 63 (19 May 2019 05:26) (60 - 78)  BP: 103/59 (19 May 2019 05:26) (103/59 - 127/73)  BP(mean): --  RR: 17 (19 May 2019 05:26) (16 - 19)  SpO2: 99% (19 May 2019 05:26) (95% - 100%)    I&O's Detail    18 May 2019 07:01  -  19 May 2019 07:00  --------------------------------------------------------  IN:  Total IN: 0 mL    OUT:    Bulb: 175 mL    Voided: 3150 mL  Total OUT: 3325 mL    Total NET: -3325 mL          Daily     Daily Weight in k (19 May 2019 01:26)    LABS:                        10.0   12.33 )-----------( 206      ( 18 May 2019 02:10 )             30.6
UROLOGY DAILY PROGRESS NOTE:     Subjective:    Patient was seen and examined this morning during morning rounds. Pain is well controlled. Tolerating regular diet with GI function. OOB.    Objective:    NAD, awake and alert  Respirations nonlabored  Abdomen soft, appropriately tender, mildly distended  No guarding or rebound tenderness   MANNY SS    MEDICATIONS  (STANDING):  docusate sodium 100 milliGRAM(s) Oral two times a day  heparin  Injectable 5000 Unit(s) SubCutaneous every 8 hours  ketorolac   Injectable 15 milliGRAM(s) IV Push every 6 hours  levothyroxine 100 MICROGram(s) Oral daily  metoprolol succinate ER 25 milliGRAM(s) Oral daily  senna 2 Tablet(s) Oral at bedtime    MEDICATIONS  (PRN):  acetaminophen   Tablet .. 650 milliGRAM(s) Oral every 6 hours PRN Temp greater or equal to 38C (100.4F), Mild Pain (1 - 3)  ALPRAZolam 1 milliGRAM(s) Oral every 6 hours PRN Anxiety  cyclobenzaprine 5 milliGRAM(s) Oral three times a day PRN Muscle Spasm  ondansetron Injectable 4 milliGRAM(s) IV Push every 6 hours PRN Nausea and/or Vomiting  traMADol 25 milliGRAM(s) Oral every 4 hours PRN Moderate Pain (4 - 6)  traMADol 50 milliGRAM(s) Oral every 4 hours PRN Severe Pain (7 - 10)      Vital Signs Last 24 Hrs  T(C): 37.7 (17 May 2019 05:10), Max: 37.7 (17 May 2019 05:10)  T(F): 99.8 (17 May 2019 05:10), Max: 99.8 (17 May 2019 05:10)  HR: 85 (17 May 2019 05:10) (63 - 87)  BP: 116/64 (17 May 2019 05:10) (99/57 - 116/64)  BP(mean): --  RR: 16 (17 May 2019 05:10) (16 - 18)  SpO2: 98% (17 May 2019 05:10) (97% - 100%)    I&O's Detail    16 May 2019 07:01  -  17 May 2019 07:00  --------------------------------------------------------  IN:    dextrose 5% + sodium chloride 0.45%.: 900 mL  Total IN: 900 mL    OUT:    Bulb: 150 mL    Voided: 1640 mL  Total OUT: 1790 mL    Total NET: -890 mL          LABS:                        11.1   16.00 )-----------( 259      ( 16 May 2019 06:56 )             34.1     05-17    138  |  106  |  12  ----------------------------<  109<H>  4.2   |  24  |  1.09    Ca    8.2<L>      17 May 2019 06:00
Overnight events:  Pt had "panic attack" overnight, required dose of xanax with relief    Subjective:  Pt c/o incisional pain, no GI function yet, no N/V, not OOB yet    Objective:    Vital signs  T(C): , Max: 36.6 (05-15-19 @ 10:16)  HR: 67 (05-15-19 @ 23:39)  BP: 126/72 (05-15-19 @ 23:39)  SpO2: 97% (05-15-19 @ 23:39)  Wt(kg): --    Output   Dawkins: 125 + approx 200 in dawkins  MANNY: 160  05-15 @ 07:01  -  05-16 @ 07:00  --------------------------------------------------------  IN: 500 mL / OUT: 285 mL / NET: 215 mL        Gen: NAD  Abd: dressings c/d/i, softly distended, appropriately tender  : scrotal edema, dawkins removed on rounds    Labs                        11.1   16.00 )-----------( 259      ( 16 May 2019 06:56 )             34.1     16 May 2019 06:56    139    |  105    |  14     ----------------------------<  119    4.2     |  23     |  1.02     Ca    8.3        16 May 2019 06:56

## 2019-05-19 NOTE — PROGRESS NOTE ADULT - ASSESSMENT
47 yo M POD #1 left lap partial nephrectomy    -IVM  -Continue CLD, monitor GI function  -D/c dawkins, check PVR  -xanax prn  -add toradol  -scrotal elevation  -AM labs reviewed  -DVT prophy, IS, OOB, ambulate  -F/U med
This is a 49 y/o male with h/o HTN, [place], herniated disc, low back pain, anxiety , s/p  lap left partial nephrectomy 5/15, postop fever on pod3
47 yo M POD #2 left lap partial nephrectomy    -Continue regular diet, monitor GI function  -xanax prn  -pain control prn  -scrotal elevation  -AM labs reviewed  -DVT prophy, IS, OOB, ambulate  -F/U med
47 yo M POD #4 left lap partial nephrectomy, afebrile    -Continue regular diet, monitor GI function  -Monitor VS  -F/U cultures; BCx prelim neg  -xanax prn  -pain control prn  -scrotal elevation  -DVT prophy, IS, OOB, ambulate  -F/U med
48 male s/p L lap part nephx
49 yo M POD #3 left lap partial nephrectomy, febrile to 101.4 5/17 pm, cultures sent    -Continue regular diet, monitor GI function  -CBC reviewed  -Monitor VS  -F/U cultures  -xanax prn  -pain control prn  -scrotal elevation  -DVT prophy, IS, OOB, ambulate  -F/U med
This is a 47 y/o male with h/o HTN, [place], herniated disc, low back pain, anxiety admitted for lap left partial nephrectomy. S/P OR, POD#2.
This is a 49 y/o male with h/o HTN, [place], herniated disc, low back pain, anxiety , s/p  lap left partial nephrectomy 5/15, postop fever on pod3

## 2019-05-19 NOTE — PROGRESS NOTE ADULT - ATTENDING COMMENTS
Pt seen and examined. Agree with above.   --OOB  --Pain control  --clears until flatus
Pt seen/examined.  Case discussed with housestaff/PA team.  Agree with above note history, physical and assessment/plan.
Pt seen/examined.  Case discussed with housestaff/PA team.  Agree with above note history, physical and assessment/plan.
Pt seen and examined. C/o muscle aches all over and anxiety attack last night. Needs OOB and ambulate.

## 2019-05-19 NOTE — PROGRESS NOTE ADULT - PROBLEM SELECTOR PLAN 4
BP well controlled  -Continue metoprolol
BP well controlled  -Continue metoprolol
Continue Levothyroxine

## 2019-05-19 NOTE — PROGRESS NOTE ADULT - PROBLEM SELECTOR PLAN 5
[FreeTextEntry1] : 2-month-old male with an upper respiratory infection.Recommend supportive care including antipyretics, fluids, and nasal suction. Return if symptoms worsen or persist.\par 
Continue Levothyroxine
Continue Levothyroxine
Well controlled.  Continue Xanax 1mg Q6H PRN

## 2019-05-19 NOTE — DISCHARGE NOTE PROVIDER - NSDCCPCAREPLAN_GEN_ALL_CORE_FT
PRINCIPAL DISCHARGE DIAGNOSIS  Diagnosis: Kidney tumor  Assessment and Plan of Treatment: Drink plenty of fluids.  No heavy lifting (greater than 10 pounds) or straining for 4 to 6 weeks.  You may shower, just pat white strips dry, they will fall off in a few weeks. Change dressing at drain site daily or as needed until dry. Do not drive when taking pain medication.  Call Dr. Mallory office  to schedule a follow up appointment.  Call the office if you have fever greater than 101, difficulty urinating, pain not relieved with pain medication, nausea/vomiting. PRINCIPAL DISCHARGE DIAGNOSIS  Diagnosis: Kidney tumor  Assessment and Plan of Treatment: Drink plenty of fluids. You can take stool softeners, senna and colace over the counter to help with bowel movements. No heavy lifting (greater than 10 pounds) or straining for 4 to 6 weeks.  You may shower, just pat white strips dry, they will fall off in a few weeks. Change dressing at drain site daily or as needed until dry. Do not drive when taking pain medication.  Call Dr. Mallory office  to schedule a follow up appointment.  Call the office if you have fever greater than 101, difficulty urinating, pain not relieved with pain medication, nausea/vomiting.

## 2019-05-19 NOTE — DISCHARGE NOTE PROVIDER - HOSPITAL COURSE
Patient underwent uncomplicated left lap partial nephrectomy on 5/15.  Had successful TOV on POD #1,  pain controlled, ambulating.  Return of GI function on POD # 2 , diet advanced without incident. On POD 2/3 patient had temp to 101.4. CXR was normal and blood and urine cultures were sent. Both were negative and pt was d/c on POD #4 to f/u with Dr. Mallory.    I-stop checked.

## 2019-05-19 NOTE — DISCHARGE NOTE NURSING/CASE MANAGEMENT/SOCIAL WORK - NSDCPNINST_GEN_ALL_CORE
Schedule follow-up appointment(s) as instructed. Notify physician for signs/symptoms of infection, including chills and/or fever; nausea, vomiting, and/or diarrhea; increased pain and/or pain not relieved by medications; increased swelling, redness, and/or drainage at incision site(s); difficulty urinating and/or cloudy or foul-smelling urine. Maintain abdominal incisions clean dry and intact; steri strips will fall off on their own in 1-2 weeks. Drink plenty of fluids. No heavy lifting or straining. Take over-the-counter stool softeners to prevent constipation, which can be a side effect from taking some pain medications. no concerns

## 2019-05-19 NOTE — DISCHARGE NOTE PROVIDER - CARE PROVIDER_API CALL
Candi Mallory)  Urology  [address] [place] [address]  Phone: [phone]  Fax: [phone]  Follow Up Time: 2 weeks

## 2019-05-19 NOTE — PROGRESS NOTE ADULT - PROBLEM SELECTOR PLAN 2
S/P partial nephrectomy on 5/15  -Management as per   -c/o incisional pain, pain control toradol/oxycodone prn  -Bowel regimen  -Incentive spirometer  -OOB and ambulate, DVT prophylaxis
Likely due to the procedure  No signs of infection  -Continue to trend CBC
S/P partial nephrectomy on 5/15  -Management as per   -scrotal/penile swelling improved  -pain control ibuprofen/oxycodone prn  -d/c plan home per primary team  -Bowel regimen  -Incentive spirometer  -OOB and ambulate, DVT prophylaxis

## 2019-05-19 NOTE — PROGRESS NOTE ADULT - PROBLEM SELECTOR PLAN 3
spiked fever 101.4F on POD3, f/u cultures  WBC trending down
BP well controlled  -Continue metoprolol
spiked fever 101.4F on POD3, f/u cultures  WBC trending down, now afebrile

## 2019-05-19 NOTE — DISCHARGE NOTE NURSING/CASE MANAGEMENT/SOCIAL WORK - NSDCDPATPORTLINK_GEN_ALL_CORE
You can access the [facility] Portal, offered by [facility], by registering with the following website: [email or web address]

## 2019-05-19 NOTE — PROGRESS NOTE ADULT - PROBLEM SELECTOR PLAN 1
-postop fever on POD3  -nontoxic, CXR clear to my view, f/u official read  -f/u cultures, trend temp curve, incentive spirometry
-postop fever on POD3, now afebrile  -nontoxic, CXR (5/18) clear lungs  -f/u cultures (blood cx neg x24h), trend temp curve, incentive spirometry
S/P partial nephrectomy, POD#2  -Management as per   -Pain control  -Bowel regimen  -Incentive spirometer  -OOB and ambulate  -DVt prophylaxis
Strict I&O's  Analgesia Antiemetics  DVT prophylaxis  Incentive spirometry  Clears / OOB  AM labs

## 2019-05-20 LAB — SPECIMEN SOURCE: SIGNIFICANT CHANGE UP

## 2019-05-21 LAB
-  AMPICILLIN: SIGNIFICANT CHANGE UP
-  CIPROFLOXACIN: SIGNIFICANT CHANGE UP
-  NITROFURANTOIN: SIGNIFICANT CHANGE UP
-  TETRACYCLINE: SIGNIFICANT CHANGE UP
-  VANCOMYCIN: SIGNIFICANT CHANGE UP
BACTERIA UR CULT: SIGNIFICANT CHANGE UP
METHOD TYPE: SIGNIFICANT CHANGE UP
ORGANISM # SPEC MICROSCOPIC CNT: SIGNIFICANT CHANGE UP
ORGANISM # SPEC MICROSCOPIC CNT: SIGNIFICANT CHANGE UP

## 2019-05-22 LAB — SURGICAL PATHOLOGY STUDY: SIGNIFICANT CHANGE UP

## 2019-05-23 LAB
BACTERIA BLD CULT: SIGNIFICANT CHANGE UP
BACTERIA BLD CULT: SIGNIFICANT CHANGE UP

## 2019-05-24 RX ORDER — AMPICILLIN 500 MG/1
500 CAPSULE ORAL 4 TIMES DAILY
Qty: 28 | Refills: 0 | Status: DISCONTINUED | COMMUNITY
Start: 2019-05-24 | End: 2019-05-24

## 2019-05-25 ENCOUNTER — RX CHANGE (OUTPATIENT)
Age: 49
End: 2019-05-25

## 2019-05-25 ENCOUNTER — MESSAGE (OUTPATIENT)
Age: 49
End: 2019-05-25

## 2019-05-26 ENCOUNTER — EMERGENCY (EMERGENCY)
Facility: HOSPITAL | Age: 49
LOS: 1 days | Discharge: HOME | End: 2019-05-26
Attending: EMERGENCY MEDICINE | Admitting: EMERGENCY MEDICINE
Payer: MEDICARE

## 2019-05-26 VITALS
DIASTOLIC BLOOD PRESSURE: 65 MMHG | SYSTOLIC BLOOD PRESSURE: 117 MMHG | HEART RATE: 78 BPM | RESPIRATION RATE: 16 BRPM | OXYGEN SATURATION: 98 %

## 2019-05-26 VITALS
DIASTOLIC BLOOD PRESSURE: 82 MMHG | OXYGEN SATURATION: 97 % | RESPIRATION RATE: 18 BRPM | TEMPERATURE: 98 F | HEART RATE: 90 BPM | SYSTOLIC BLOOD PRESSURE: 126 MMHG

## 2019-05-26 DIAGNOSIS — Z98.1 ARTHRODESIS STATUS: Chronic | ICD-10-CM

## 2019-05-26 DIAGNOSIS — M17.10 UNILATERAL PRIMARY OSTEOARTHRITIS, UNSPECIFIED KNEE: Chronic | ICD-10-CM

## 2019-05-26 DIAGNOSIS — R06.83 SNORING: Chronic | ICD-10-CM

## 2019-05-26 DIAGNOSIS — M12.9 ARTHROPATHY, UNSPECIFIED: Chronic | ICD-10-CM

## 2019-05-26 LAB
APPEARANCE UR: ABNORMAL
BILIRUB UR-MCNC: ABNORMAL
COLOR SPEC: YELLOW — SIGNIFICANT CHANGE UP
DIFF PNL FLD: ABNORMAL
GLUCOSE UR QL: NEGATIVE MG/DL — SIGNIFICANT CHANGE UP
KETONES UR-MCNC: NEGATIVE — SIGNIFICANT CHANGE UP
LEUKOCYTE ESTERASE UR-ACNC: ABNORMAL
NITRITE UR-MCNC: POSITIVE
PH UR: 5 — SIGNIFICANT CHANGE UP (ref 5–8)
PROT UR-MCNC: 30 MG/DL
SP GR SPEC: 1.02 — SIGNIFICANT CHANGE UP (ref 1.01–1.02)
UROBILINOGEN FLD QL: 4 MG/DL

## 2019-05-26 PROCEDURE — 99284 EMERGENCY DEPT VISIT MOD MDM: CPT

## 2019-05-26 PROCEDURE — 99283 EMERGENCY DEPT VISIT LOW MDM: CPT | Mod: 25

## 2019-05-26 PROCEDURE — 81001 URINALYSIS AUTO W/SCOPE: CPT

## 2019-05-26 PROCEDURE — 87086 URINE CULTURE/COLONY COUNT: CPT

## 2019-05-26 RX ORDER — CEFUROXIME AXETIL 250 MG
1 TABLET ORAL
Qty: 14 | Refills: 0
Start: 2019-05-26 | End: 2019-06-01

## 2019-05-26 NOTE — ED PROVIDER NOTE - PMH
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

## 2019-05-26 NOTE — ED ADULT NURSE NOTE - OBJECTIVE STATEMENT
Amb to ED. Pt c/o abd pressure/urinary retention.  Pt had tumor removed from left kidney on 5/15/19 & continues to have some abd pressure with burning with urination. Two days ago told he had UTI & was started on Cipro. O/E color fair. Skin warm & dry to touch. Abd distended

## 2019-05-26 NOTE — ED PROVIDER NOTE - NSFOLLOWUPINSTRUCTIONS_ED_ALL_ED_FT
1) Follow-up with Dr Mallory in the office this week. Call next business day for prompt follow-up.  2) Return to Emergency room for any worsening or persistent pain, weakness, fever, persistent bleeding, unable to pass urine, weakness, or any other concerning symptoms.  3) See attached instruction sheets for additional information, including information regarding signs and symptoms to look out for, reasons to seek immediate care and other important instructions.  4) Continue your cipro as prescribed 1) Follow-up with Dr Mallory in the office this week. Call next business day for prompt follow-up.  2) Return to Emergency room for any worsening or persistent pain, weakness, fever, persistent bleeding, unable to pass urine, weakness, or any other concerning symptoms.  3) See attached instruction sheets for additional information, including information regarding signs and symptoms to look out for, reasons to seek immediate care and other important instructions.  4) Discontinue Cipro  5) Start Ceftin twice daily for 7 days

## 2019-05-26 NOTE — ED PROVIDER NOTE - GASTROINTESTINAL, MLM
Abdomen soft, non-tender, no guarding. +mild diffuse tenderness with palpable bladder. Abdomen soft, non-distended,  no guarding. +mild diffuse tenderness to lower abd with palpable bladder. no other abd tend.

## 2019-05-26 NOTE — ED PROVIDER NOTE - CARE PROVIDER_API CALL
Candi Mallory)  Urology  [address] [place] [address]  Phone: [phone]  Fax: [phone]  Follow Up Time:

## 2019-05-26 NOTE — ED PROVIDER NOTE - OBJECTIVE STATEMENT
47 y/o male with a PMHx of nephrolithiasis, kidney mass, HTN presents to the ED c/o urinary retention. On 5/15/19 pt had kidney tumor and partial nephrectomy of the left kidney. 2 days ago pt started to have hematuria. Yesterday pt was told had UTI and rx cipro and since taking the cipro has had urinary retention. Denies fever, nausea, vomiting. No other c/o at this time. no alleviating or aggravating factors.   PMD: Brina 47 y/o male with a PMHx of nephrolithiasis, kidney mass, HTN presents to the ED c/o urinary retention. On 5/15/19 pt had kidney tumor and partial nephrectomy of the left kidney. 2 days ago pt started to have hematuria. Yesterday pt was told had UTI and rx cipro , starting yest Today, pt has had urinary retention. Denies fever, nausea, vomiting. No other c/o at this time. no alleviating or aggravating factors. Pt states has had some overflow incontinance when trying to urinate, but unable to empty the bladder.   PMD: Brina

## 2019-05-26 NOTE — ED PROVIDER NOTE - PSH
Arthropathy  right shoulder surgery  Knee arthropathy  left ACL in 2010  S/P Injection of Intervertebral Space for Herniated Disc  2004  S/P lumbar fusion  double fusion 2010  Snoring  UPPP in approximately 2016

## 2019-05-26 NOTE — ED PROVIDER NOTE - PROGRESS NOTE DETAILS
Called viraj [place] resident. States can dc home on new abx, pt to fu with Dr Mallory on Tues.  Viraj pt re urinary retention prec / inst, importance of close, prompt fu with urology. viraj him and wife re infxn prec/ inst, fever prec and to return with any changes / issues

## 2019-05-27 LAB
CULTURE RESULTS: NO GROWTH — SIGNIFICANT CHANGE UP
SPECIMEN SOURCE: SIGNIFICANT CHANGE UP

## 2019-05-29 ENCOUNTER — APPOINTMENT (OUTPATIENT)
Dept: UROLOGY | Facility: CLINIC | Age: 49
End: 2019-05-29
Payer: MEDICARE

## 2019-05-29 PROCEDURE — 51702 INSERT TEMP BLADDER CATH: CPT | Mod: 58

## 2019-05-29 NOTE — ASSESSMENT
[FreeTextEntry1] : s/p L lap partial c/b UTI and urinary retention. Failed VT today\par --Change back to cipro (not sensitive to cipro)\par --RTC on Monday for VT\par --No heavy lifting for 6 weeks
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
## 2019-05-29 NOTE — PHYSICAL EXAM
[General Appearance - Well Nourished] : well nourished [General Appearance - Well Developed] : well developed [General Appearance - In No Acute Distress] : no acute distress [Costovertebral Angle Tenderness] : no ~M costovertebral angle tenderness [Abdomen Tenderness] : non-tender [Abdomen Soft] : soft [Edema] : no peripheral edema [Exaggerated Use Of Accessory Muscles For Inspiration] : no accessory muscle use [Oriented To Time, Place, And Person] : oriented to person, place, and time [Urethral Meatus] : meatus normal [Penis Abnormality] : normal uncircumcised penis [Normal Station and Gait] : the gait and station were normal for the patient's age [FreeTextEntry1] : incisions healing well, mild bloat

## 2019-06-03 ENCOUNTER — APPOINTMENT (OUTPATIENT)
Dept: UROLOGY | Facility: CLINIC | Age: 49
End: 2019-06-03
Payer: MEDICARE

## 2019-06-03 PROCEDURE — 99213 OFFICE O/P EST LOW 20 MIN: CPT | Mod: 24
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
## 2019-06-05 NOTE — ASSESSMENT
[FreeTextEntry1] : s/p L lap partial c/b UTI and urinary retention. Passed VT today\par --Complete cipro\par --No heavy lifting for 6 weeks\par --Renal US in 3mo

## 2019-06-05 NOTE — PHYSICAL EXAM
[General Appearance - Well Developed] : well developed [General Appearance - Well Nourished] : well nourished [General Appearance - In No Acute Distress] : no acute distress [Costovertebral Angle Tenderness] : no ~M costovertebral angle tenderness [Abdomen Soft] : soft [Abdomen Tenderness] : non-tender [Urethral Meatus] : meatus normal [Edema] : no peripheral edema [Penis Abnormality] : normal uncircumcised penis [Exaggerated Use Of Accessory Muscles For Inspiration] : no accessory muscle use [Oriented To Time, Place, And Person] : oriented to person, place, and time [Normal Station and Gait] : the gait and station were normal for the patient's age [FreeTextEntry1] : incisions healing well

## 2019-06-13 ENCOUNTER — OUTPATIENT (OUTPATIENT)
Dept: OUTPATIENT SERVICES | Facility: HOSPITAL | Age: 49
LOS: 1 days | End: 2019-06-13
Payer: MEDICARE

## 2019-06-13 ENCOUNTER — APPOINTMENT (OUTPATIENT)
Dept: FAMILY MEDICINE | Facility: CLINIC | Age: 49
End: 2019-06-13
Payer: MEDICARE

## 2019-06-13 VITALS — RESPIRATION RATE: 15 BRPM | WEIGHT: 228.7 LBS | BODY MASS INDEX: 32.74 KG/M2 | HEIGHT: 70 IN

## 2019-06-13 DIAGNOSIS — R06.83 SNORING: Chronic | ICD-10-CM

## 2019-06-13 DIAGNOSIS — M12.9 ARTHROPATHY, UNSPECIFIED: Chronic | ICD-10-CM

## 2019-06-13 DIAGNOSIS — Z98.1 ARTHRODESIS STATUS: Chronic | ICD-10-CM

## 2019-06-13 DIAGNOSIS — M17.10 UNILATERAL PRIMARY OSTEOARTHRITIS, UNSPECIFIED KNEE: Chronic | ICD-10-CM

## 2019-06-13 DIAGNOSIS — N39.0 URINARY TRACT INFECTION, SITE NOT SPECIFIED: ICD-10-CM

## 2019-06-13 DIAGNOSIS — R33.8 OTHER RETENTION OF URINE: ICD-10-CM

## 2019-06-13 PROCEDURE — 96372 THER/PROPH/DIAG INJ SC/IM: CPT

## 2019-06-13 PROCEDURE — 51702 INSERT TEMP BLADDER CATH: CPT

## 2019-06-13 RX ORDER — CIPROFLOXACIN HYDROCHLORIDE 500 MG/1
500 TABLET, FILM COATED ORAL
Qty: 14 | Refills: 0 | Status: DISCONTINUED | COMMUNITY
Start: 2019-05-24 | End: 2019-06-13

## 2019-06-13 RX ORDER — CYANOCOBALAMIN 1000 UG/ML
1000 INJECTION INTRAMUSCULAR; SUBCUTANEOUS
Qty: 0 | Refills: 0 | Status: COMPLETED | OUTPATIENT
Start: 2019-06-13

## 2019-06-13 RX ORDER — AMOXICILLIN 500 MG/1
500 TABLET, FILM COATED ORAL 3 TIMES DAILY
Qty: 15 | Refills: 0 | Status: DISCONTINUED | COMMUNITY
Start: 2019-05-25 | End: 2019-06-13

## 2019-06-13 RX ORDER — PHENAZOPYRIDINE 200 MG/1
200 TABLET, FILM COATED ORAL 3 TIMES DAILY
Qty: 21 | Refills: 0 | Status: DISCONTINUED | COMMUNITY
Start: 2019-05-29 | End: 2019-06-13

## 2019-06-13 RX ADMIN — CYANOCOBALAMIN 1 MCG/ML: 1000 INJECTION INTRAMUSCULAR; SUBCUTANEOUS at 00:00

## 2019-06-13 NOTE — PHYSICAL EXAM
[No Acute Distress] : no acute distress [Well Nourished] : well nourished [Well Developed] : well developed [Well-Appearing] : well-appearing [Normal Sclera/Conjunctiva] : normal sclera/conjunctiva [EOMI] : extraocular movements intact [PERRL] : pupils equal round and reactive to light [Normal Oropharynx] : the oropharynx was normal [No JVD] : no jugular venous distention [Normal Outer Ear/Nose] : the outer ears and nose were normal in appearance [Supple] : supple [No Lymphadenopathy] : no lymphadenopathy [No Respiratory Distress] : no respiratory distress  [Clear to Auscultation] : lungs were clear to auscultation bilaterally [Thyroid Normal, No Nodules] : the thyroid was normal and there were no nodules present [No Accessory Muscle Use] : no accessory muscle use [Normal Rate] : normal rate  [Regular Rhythm] : with a regular rhythm [Normal S1, S2] : normal S1 and S2 [No Murmur] : no murmur heard [No Varicosities] : no varicosities [No Abdominal Bruit] : a ~M bruit was not heard ~T in the abdomen [No Carotid Bruits] : no carotid bruits [Pedal Pulses Present] : the pedal pulses are present [No Extremity Clubbing/Cyanosis] : no extremity clubbing/cyanosis [No Edema] : there was no peripheral edema [No Palpable Aorta] : no palpable aorta [Non Tender] : non-tender [Soft] : abdomen soft [No HSM] : no HSM [Non-distended] : non-distended [No Masses] : no abdominal mass palpated [Normal Anterior Cervical Nodes] : no anterior cervical lymphadenopathy [Normal Bowel Sounds] : normal bowel sounds [Normal Posterior Cervical Nodes] : no posterior cervical lymphadenopathy [No CVA Tenderness] : no CVA  tenderness [No Spinal Tenderness] : no spinal tenderness [Grossly Normal Strength/Tone] : grossly normal strength/tone [No Joint Swelling] : no joint swelling [Coordination Grossly Intact] : coordination grossly intact [Normal Gait] : normal gait [No Rash] : no rash [No Focal Deficits] : no focal deficits [Normal Affect] : the affect was normal [Deep Tendon Reflexes (DTR)] : deep tendon reflexes were 2+ and symmetric [Normal Insight/Judgement] : insight and judgment were intact

## 2019-06-13 NOTE — HEALTH RISK ASSESSMENT
[0] : 2) Feeling down, depressed, or hopeless: Not at all (0) [No falls in past year] : Patient reported no falls in the past year [] : No [[id number]] : 0

## 2019-06-13 NOTE — HISTORY OF PRESENT ILLNESS
[de-identified] : Here for f/u after renal cancer surgery. Patient feels very tired and easily irritable. He denies depression or suicidal ideation. He is frustrated due to multiple surgeries and demanding daily routine.denies urinary symptoms.  [FreeTextEntry1] : fatigue

## 2019-06-13 NOTE — PLAN
[FreeTextEntry1] : b 12 shot.\par Vitamin D supplement. \par check labs.\par  counseled patient, relaxation techniques.\par

## 2019-06-14 LAB
ALBUMIN SERPL ELPH-MCNC: 4.5 G/DL
ALP BLD-CCNC: 110 U/L
ALT SERPL-CCNC: 18 U/L
ANION GAP SERPL CALC-SCNC: 11 MMOL/L
APPEARANCE: CLEAR
AST SERPL-CCNC: 15 U/L
BASOPHILS # BLD AUTO: 0.05 K/UL
BASOPHILS NFR BLD AUTO: 0.6 %
BILIRUB SERPL-MCNC: 0.9 MG/DL
BILIRUBIN URINE: NEGATIVE
BLOOD URINE: NEGATIVE
BUN SERPL-MCNC: 16 MG/DL
CALCIUM SERPL-MCNC: 9.5 MG/DL
CHLORIDE SERPL-SCNC: 109 MMOL/L
CHOLEST SERPL-MCNC: 195 MG/DL
CHOLEST/HDLC SERPL: 3.1 RATIO
CO2 SERPL-SCNC: 25 MMOL/L
COLOR: YELLOW
CREAT SERPL-MCNC: 0.93 MG/DL
CRP SERPL-MCNC: 0.24 MG/DL
EOSINOPHIL # BLD AUTO: 0.34 K/UL
EOSINOPHIL NFR BLD AUTO: 4.3 %
ERYTHROCYTE [SEDIMENTATION RATE] IN BLOOD BY WESTERGREN METHOD: 12 MM/HR
ESTIMATED AVERAGE GLUCOSE: 108 MG/DL
FERRITIN SERPL-MCNC: 73 NG/ML
FOLATE SERPL-MCNC: 17.2 NG/ML
GLUCOSE QUALITATIVE U: NEGATIVE
GLUCOSE SERPL-MCNC: 100 MG/DL
HBA1C MFR BLD HPLC: 5.4 %
HCT VFR BLD CALC: 40.3 %
HDLC SERPL-MCNC: 63 MG/DL
HGB BLD-MCNC: 12.3 G/DL
IMM GRANULOCYTES NFR BLD AUTO: 0.4 %
IRON SATN MFR SERPL: 18 %
IRON SERPL-MCNC: 67 UG/DL
KETONES URINE: NEGATIVE
LDLC SERPL CALC-MCNC: 104 MG/DL
LEUKOCYTE ESTERASE URINE: NEGATIVE
LYMPHOCYTES # BLD AUTO: 1.89 K/UL
LYMPHOCYTES NFR BLD AUTO: 23.9 %
MAGNESIUM SERPL-MCNC: 2.1 MG/DL
MAN DIFF?: NORMAL
MCHC RBC-ENTMCNC: 28.7 PG
MCHC RBC-ENTMCNC: 30.5 GM/DL
MCV RBC AUTO: 94.2 FL
MONOCYTES # BLD AUTO: 0.95 K/UL
MONOCYTES NFR BLD AUTO: 12 %
NEUTROPHILS # BLD AUTO: 4.66 K/UL
NEUTROPHILS NFR BLD AUTO: 58.8 %
NITRITE URINE: NEGATIVE
PH URINE: 6
PLATELET # BLD AUTO: 294 K/UL
POTASSIUM SERPL-SCNC: 4.9 MMOL/L
PROT SERPL-MCNC: 7.1 G/DL
PROTEIN URINE: NORMAL
PSA FREE FLD-MCNC: 24 %
PSA FREE SERPL-MCNC: 0.38 NG/ML
PSA SERPL-MCNC: 1.58 NG/ML
RBC # BLD: 4.28 M/UL
RBC # FLD: 13.5 %
SODIUM SERPL-SCNC: 145 MMOL/L
SPECIFIC GRAVITY URINE: 1.03
T3FREE SERPL-MCNC: 3.28 PG/ML
T4 FREE SERPL-MCNC: 1.5 NG/DL
THYROGLOB AB SERPL-ACNC: <20 IU/ML
THYROPEROXIDASE AB SERPL IA-ACNC: <10 IU/ML
TIBC SERPL-MCNC: 379 UG/DL
TRIGL SERPL-MCNC: 140 MG/DL
TSH SERPL-ACNC: 1.04 UIU/ML
UIBC SERPL-MCNC: 312 UG/DL
UROBILINOGEN URINE: NORMAL
VIT B12 SERPL-MCNC: >2000 PG/ML
WBC # FLD AUTO: 7.92 K/UL

## 2019-06-17 LAB
25(OH)D3 SERPL-MCNC: 22.1 NG/ML
EBV EA AB SER IA-ACNC: <5 U/ML
EBV EA AB TITR SER IF: NEGATIVE
EBV EA IGG SER QL IA: <3 U/ML
EBV EA IGG SER-ACNC: NEGATIVE
EBV EA IGM SER IA-ACNC: NEGATIVE
EBV PATRN SPEC IB-IMP: NORMAL
EBV VCA IGG SER IA-ACNC: 21.6 U/ML
EBV VCA IGM SER QL IA: <10 U/ML
EPSTEIN-BARR VIRUS CAPSID ANTIGEN IGG: ABNORMAL
HETEROPH AB SER QL: NEGATIVE
TESTOST BND SERPL-MCNC: 8.6 PG/ML
TESTOST SERPL-MCNC: 412.9 NG/DL

## 2019-06-19 LAB
HAPTOGLOB SERPL-MCNC: 234 MG/DL
LDH SERPL-CCNC: 207 U/L
RBC # BLD: 4.31 M/UL
RETICS # AUTO: 1.5 %
RETICS AGGREG/RBC NFR: 65.9 K/UL

## 2019-06-21 ENCOUNTER — OUTPATIENT (OUTPATIENT)
Dept: OUTPATIENT SERVICES | Facility: HOSPITAL | Age: 49
LOS: 1 days | Discharge: HOME | End: 2019-06-21

## 2019-06-21 DIAGNOSIS — M12.9 ARTHROPATHY, UNSPECIFIED: Chronic | ICD-10-CM

## 2019-06-21 DIAGNOSIS — R06.83 SNORING: Chronic | ICD-10-CM

## 2019-06-21 DIAGNOSIS — Z98.1 ARTHRODESIS STATUS: Chronic | ICD-10-CM

## 2019-06-21 DIAGNOSIS — M17.10 UNILATERAL PRIMARY OSTEOARTHRITIS, UNSPECIFIED KNEE: Chronic | ICD-10-CM

## 2019-06-21 DIAGNOSIS — D64.9 ANEMIA, UNSPECIFIED: ICD-10-CM

## 2019-06-26 ENCOUNTER — RESULT REVIEW (OUTPATIENT)
Age: 49
End: 2019-06-26

## 2019-06-26 ENCOUNTER — APPOINTMENT (OUTPATIENT)
Dept: HEMATOLOGY ONCOLOGY | Facility: CLINIC | Age: 49
End: 2019-06-26
Payer: MEDICARE

## 2019-06-26 ENCOUNTER — LABORATORY RESULT (OUTPATIENT)
Age: 49
End: 2019-06-26

## 2019-06-26 VITALS
BODY MASS INDEX: 34.06 KG/M2 | HEART RATE: 71 BPM | OXYGEN SATURATION: 99 % | RESPIRATION RATE: 16 BRPM | DIASTOLIC BLOOD PRESSURE: 90 MMHG | WEIGHT: 229.94 LBS | TEMPERATURE: 98.2 F | SYSTOLIC BLOOD PRESSURE: 132 MMHG | HEIGHT: 68.9 IN

## 2019-06-26 LAB
BASOPHILS # BLD AUTO: 0.1 K/UL — SIGNIFICANT CHANGE UP (ref 0–0.2)
BASOPHILS NFR BLD AUTO: 1 % — SIGNIFICANT CHANGE UP (ref 0–2)
CREAT SERPL-MCNC: 0.96 MG/DL
EOSINOPHIL # BLD AUTO: 0.1 K/UL — SIGNIFICANT CHANGE UP (ref 0–0.5)
EOSINOPHIL NFR BLD AUTO: 1.7 % — SIGNIFICANT CHANGE UP (ref 0–6)
FERRITIN SERPL-MCNC: 49 NG/ML
FOLATE SERPL-MCNC: 5.6 NG/ML
HAPTOGLOB SERPL-MCNC: 205 MG/DL
HCT VFR BLD CALC: 39.7 % — SIGNIFICANT CHANGE UP (ref 39–50)
HGB BLD-MCNC: 12.8 G/DL — LOW (ref 13–17)
IRON SATN MFR SERPL: 14 %
IRON SERPL-MCNC: 55 UG/DL
LYMPHOCYTES # BLD AUTO: 1.6 K/UL — SIGNIFICANT CHANGE UP (ref 1–3.3)
LYMPHOCYTES # BLD AUTO: 22.3 % — SIGNIFICANT CHANGE UP (ref 13–44)
MCHC RBC-ENTMCNC: 29 PG — SIGNIFICANT CHANGE UP (ref 27–34)
MCHC RBC-ENTMCNC: 32.3 G/DL — SIGNIFICANT CHANGE UP (ref 32–36)
MCV RBC AUTO: 90 FL — SIGNIFICANT CHANGE UP (ref 80–100)
MONOCYTES # BLD AUTO: 0.5 K/UL — SIGNIFICANT CHANGE UP (ref 0–0.9)
MONOCYTES NFR BLD AUTO: 7.4 % — SIGNIFICANT CHANGE UP (ref 2–14)
NEUTROPHILS # BLD AUTO: 4.7 K/UL — SIGNIFICANT CHANGE UP (ref 1.8–7.4)
NEUTROPHILS NFR BLD AUTO: 67.6 % — SIGNIFICANT CHANGE UP (ref 43–77)
PLATELET # BLD AUTO: 282 K/UL — SIGNIFICANT CHANGE UP (ref 150–400)
RBC # BLD: 4.41 M/UL — SIGNIFICANT CHANGE UP (ref 4.2–5.8)
RBC # FLD: 12.3 % — SIGNIFICANT CHANGE UP (ref 10.3–14.5)
RETICS #: 48.2 K/UL — SIGNIFICANT CHANGE UP (ref 25–125)
RETICS/RBC NFR: 1.1 % — SIGNIFICANT CHANGE UP (ref 0.5–2.5)
TIBC SERPL-MCNC: 387 UG/DL
UIBC SERPL-MCNC: 332 UG/DL
VIT B12 SERPL-MCNC: 385 PG/ML
WBC # BLD: 6.9 K/UL — SIGNIFICANT CHANGE UP (ref 3.8–10.5)
WBC # FLD AUTO: 6.9 K/UL — SIGNIFICANT CHANGE UP (ref 3.8–10.5)

## 2019-06-26 PROCEDURE — 99205 OFFICE O/P NEW HI 60 MIN: CPT
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
## 2019-06-26 NOTE — REVIEW OF SYSTEMS
[Patient Intake Form Reviewed] : Patient intake form was reviewed [Fatigue] : fatigue [Negative] : Allergic/Immunologic
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
## 2019-06-26 NOTE — CONSULT LETTER
[Consult Letter:] : I had the pleasure of evaluating your patient, [unfilled]. [Dear  ___] : Dear  [unfilled], [Consult Closing:] : Thank you very much for allowing me to participate in the care of this patient.  If you have any questions, please do not hesitate to contact me. [Please see my note below.] : Please see my note below. [Sincerely,] : Sincerely, [FreeTextEntry3] : Deborah Rodriguez M.D.

## 2019-06-26 NOTE — CONSULT LETTER
[Dear  ___] : Dear  [unfilled], [Consult Letter:] : I had the pleasure of evaluating your patient, [unfilled]. [Please see my note below.] : Please see my note below. [Consult Closing:] : Thank you very much for allowing me to participate in the care of this patient.  If you have any questions, please do not hesitate to contact me. [Sincerely,] : Sincerely, [FreeTextEntry3] : Deborah Rodriguez M.D.

## 2019-06-26 NOTE — CONSULT LETTER
[Consult Letter:] : I had the pleasure of evaluating your patient, [unfilled]. [Dear  ___] : Dear  [unfilled], [Please see my note below.] : Please see my note below. [Consult Closing:] : Thank you very much for allowing me to participate in the care of this patient.  If you have any questions, please do not hesitate to contact me. [Sincerely,] : Sincerely, [FreeTextEntry3] : Deborah Rodriguez M.D.

## 2019-06-27 LAB
ALBUMIN MFR SERPL ELPH: 60.9 %
ALBUMIN SERPL-MCNC: 4.3 G/DL
ALBUMIN/GLOB SERPL: 1.6 RATIO
ALPHA1 GLOB MFR SERPL ELPH: 4.4 %
ALPHA1 GLOB SERPL ELPH-MCNC: 0.3 G/DL
ALPHA2 GLOB MFR SERPL ELPH: 10.5 %
ALPHA2 GLOB SERPL ELPH-MCNC: 0.7 G/DL
B-GLOBULIN MFR SERPL ELPH: 12.2 %
B-GLOBULIN SERPL ELPH-MCNC: 0.9 G/DL
GAMMA GLOB FLD ELPH-MCNC: 0.8 G/DL
GAMMA GLOB MFR SERPL ELPH: 12 %
INTERPRETATION SERPL IEP-IMP: NORMAL
M PROTEIN SPEC IFE-MCNC: NORMAL
PROT SERPL-MCNC: 7 G/DL
PROT SERPL-MCNC: 7 G/DL

## 2019-06-29 LAB — IGA 24H UR QL IFE: NORMAL

## 2019-07-18 ENCOUNTER — OUTPATIENT (OUTPATIENT)
Dept: OUTPATIENT SERVICES | Facility: HOSPITAL | Age: 49
LOS: 1 days | Discharge: HOME | End: 2019-07-18

## 2019-07-18 DIAGNOSIS — R06.83 SNORING: Chronic | ICD-10-CM

## 2019-07-18 DIAGNOSIS — M17.10 UNILATERAL PRIMARY OSTEOARTHRITIS, UNSPECIFIED KNEE: Chronic | ICD-10-CM

## 2019-07-18 DIAGNOSIS — D64.9 ANEMIA, UNSPECIFIED: ICD-10-CM

## 2019-07-18 DIAGNOSIS — M12.9 ARTHROPATHY, UNSPECIFIED: Chronic | ICD-10-CM

## 2019-07-18 DIAGNOSIS — Z98.1 ARTHRODESIS STATUS: Chronic | ICD-10-CM

## 2019-07-22 ENCOUNTER — RESULT REVIEW (OUTPATIENT)
Age: 49
End: 2019-07-22

## 2019-07-22 ENCOUNTER — APPOINTMENT (OUTPATIENT)
Dept: HEMATOLOGY ONCOLOGY | Facility: CLINIC | Age: 49
End: 2019-07-22
Payer: MEDICARE

## 2019-07-22 VITALS
BODY MASS INDEX: 34.28 KG/M2 | HEART RATE: 67 BPM | DIASTOLIC BLOOD PRESSURE: 98 MMHG | OXYGEN SATURATION: 99 % | WEIGHT: 231.49 LBS | TEMPERATURE: 98.4 F | RESPIRATION RATE: 16 BRPM | SYSTOLIC BLOOD PRESSURE: 155 MMHG

## 2019-07-22 LAB
BASOPHILS # BLD AUTO: 0 K/UL — SIGNIFICANT CHANGE UP (ref 0–0.2)
BASOPHILS NFR BLD AUTO: 0.4 % — SIGNIFICANT CHANGE UP (ref 0–2)
EOSINOPHIL # BLD AUTO: 0.1 K/UL — SIGNIFICANT CHANGE UP (ref 0–0.5)
EOSINOPHIL NFR BLD AUTO: 1.5 % — SIGNIFICANT CHANGE UP (ref 0–6)
HCT VFR BLD CALC: 43.1 % — SIGNIFICANT CHANGE UP (ref 39–50)
HGB BLD-MCNC: 14 G/DL — SIGNIFICANT CHANGE UP (ref 13–17)
LYMPHOCYTES # BLD AUTO: 1.8 K/UL — SIGNIFICANT CHANGE UP (ref 1–3.3)
LYMPHOCYTES # BLD AUTO: 21.7 % — SIGNIFICANT CHANGE UP (ref 13–44)
MCHC RBC-ENTMCNC: 29.5 PG — SIGNIFICANT CHANGE UP (ref 27–34)
MCHC RBC-ENTMCNC: 32.6 G/DL — SIGNIFICANT CHANGE UP (ref 32–36)
MCV RBC AUTO: 90.4 FL — SIGNIFICANT CHANGE UP (ref 80–100)
MONOCYTES # BLD AUTO: 0.7 K/UL — SIGNIFICANT CHANGE UP (ref 0–0.9)
MONOCYTES NFR BLD AUTO: 7.6 % — SIGNIFICANT CHANGE UP (ref 2–14)
NEUTROPHILS # BLD AUTO: 5.9 K/UL — SIGNIFICANT CHANGE UP (ref 1.8–7.4)
NEUTROPHILS NFR BLD AUTO: 68.8 % — SIGNIFICANT CHANGE UP (ref 43–77)
PLATELET # BLD AUTO: 287 K/UL — SIGNIFICANT CHANGE UP (ref 150–400)
RBC # BLD: 4.76 M/UL — SIGNIFICANT CHANGE UP (ref 4.2–5.8)
RBC # FLD: 12.4 % — SIGNIFICANT CHANGE UP (ref 10.3–14.5)
WBC # BLD: 8.5 K/UL — SIGNIFICANT CHANGE UP (ref 3.8–10.5)
WBC # FLD AUTO: 8.5 K/UL — SIGNIFICANT CHANGE UP (ref 3.8–10.5)

## 2019-07-22 PROCEDURE — 99214 OFFICE O/P EST MOD 30 MIN: CPT

## 2019-07-22 NOTE — REVIEW OF SYSTEMS
[Fatigue] : fatigue [SOB on Exertion] : shortness of breath during exertion [Negative] : Allergic/Immunologic [Vomiting] : no vomiting

## 2019-07-22 NOTE — ASSESSMENT
[FreeTextEntry1] : Anemia-reviewed lab work with patient. Hemoglobin improving on June lab work. Repeat CBC today.\par In light of bloated feeling, and history of renal cancer, obtain abdominal ultrasound. Note last colonoscopy 3/2019.\par Patient's questions have been answered to his apparent satisfaction at this time. He is agreeable to recommended followup.
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
## 2019-07-23 ENCOUNTER — FORM ENCOUNTER (OUTPATIENT)
Age: 49
End: 2019-07-23

## 2019-07-23 LAB
25(OH)D3 SERPL-MCNC: 45.6 NG/ML
FERRITIN SERPL-MCNC: 49 NG/ML
FOLATE SERPL-MCNC: 8.3 NG/ML
IRON SATN MFR SERPL: 26 %
IRON SERPL-MCNC: 102 UG/DL
TIBC SERPL-MCNC: 394 UG/DL
UIBC SERPL-MCNC: 292 UG/DL
VIT B12 SERPL-MCNC: 686 PG/ML

## 2019-07-24 ENCOUNTER — OUTPATIENT (OUTPATIENT)
Dept: OUTPATIENT SERVICES | Facility: HOSPITAL | Age: 49
LOS: 1 days | End: 2019-07-24
Payer: MEDICARE

## 2019-07-24 ENCOUNTER — APPOINTMENT (OUTPATIENT)
Age: 49
End: 2019-07-24
Payer: MEDICARE

## 2019-07-24 ENCOUNTER — LABORATORY RESULT (OUTPATIENT)
Age: 49
End: 2019-07-24

## 2019-07-24 ENCOUNTER — APPOINTMENT (OUTPATIENT)
Dept: ULTRASOUND IMAGING | Facility: HOSPITAL | Age: 49
End: 2019-07-24
Payer: MEDICARE

## 2019-07-24 VITALS
DIASTOLIC BLOOD PRESSURE: 90 MMHG | BODY MASS INDEX: 33.47 KG/M2 | WEIGHT: 226 LBS | SYSTOLIC BLOOD PRESSURE: 130 MMHG | TEMPERATURE: 98 F | HEART RATE: 72 BPM | OXYGEN SATURATION: 98 % | HEIGHT: 68.9 IN | RESPIRATION RATE: 15 BRPM

## 2019-07-24 DIAGNOSIS — M12.9 ARTHROPATHY, UNSPECIFIED: Chronic | ICD-10-CM

## 2019-07-24 DIAGNOSIS — M17.10 UNILATERAL PRIMARY OSTEOARTHRITIS, UNSPECIFIED KNEE: Chronic | ICD-10-CM

## 2019-07-24 DIAGNOSIS — C64.9 MALIGNANT NEOPLASM OF UNSPECIFIED KIDNEY, EXCEPT RENAL PELVIS: ICD-10-CM

## 2019-07-24 DIAGNOSIS — Z98.1 ARTHRODESIS STATUS: Chronic | ICD-10-CM

## 2019-07-24 DIAGNOSIS — R06.83 SNORING: Chronic | ICD-10-CM

## 2019-07-24 PROCEDURE — 36415 COLL VENOUS BLD VENIPUNCTURE: CPT

## 2019-07-24 PROCEDURE — 76700 US EXAM ABDOM COMPLETE: CPT

## 2019-07-24 PROCEDURE — 76700 US EXAM ABDOM COMPLETE: CPT | Mod: 26

## 2019-07-24 PROCEDURE — 90715 TDAP VACCINE 7 YRS/> IM: CPT

## 2019-07-24 PROCEDURE — 99215 OFFICE O/P EST HI 40 MIN: CPT | Mod: 25

## 2019-07-24 PROCEDURE — 90471 IMMUNIZATION ADMIN: CPT

## 2019-07-24 PROCEDURE — 96372 THER/PROPH/DIAG INJ SC/IM: CPT

## 2019-07-24 RX ORDER — CYCLOBENZAPRINE HYDROCHLORIDE 5 MG/1
5 TABLET, FILM COATED ORAL 3 TIMES DAILY
Qty: 10 | Refills: 0 | Status: DISCONTINUED | COMMUNITY
Start: 2019-05-25 | End: 2019-07-24

## 2019-07-24 RX ORDER — CYANOCOBALAMIN 1000 UG/ML
1000 INJECTION INTRAMUSCULAR; SUBCUTANEOUS
Qty: 0 | Refills: 0 | Status: COMPLETED | OUTPATIENT
Start: 2019-07-24

## 2019-07-24 NOTE — HEALTH RISK ASSESSMENT
[No falls in past year] : Patient reported no falls in the past year [No] : In the past 12 months have you used drugs other than those required for medical reasons? No [0] : 2) Feeling down, depressed, or hopeless: Not at all (0) [] : No [de-identified] : sedentary [de-identified] : regular [[id number]] : 0

## 2019-07-24 NOTE — REVIEW OF SYSTEMS
[Fatigue] : fatigue [Dizziness] : dizziness [Negative] : Psychiatric [Fever] : no fever [Recent Change In Weight] : ~T no recent weight change [Night Sweats] : no night sweats [Chills] : no chills [Fainting] : no fainting [Headache] : no headache [Unsteady Walk] : no ataxia [Confusion] : no confusion [Memory Loss] : no memory loss

## 2019-07-24 NOTE — HISTORY OF PRESENT ILLNESS
[de-identified] : c/o feeling less exhausted for couple weeks and he feels dizzy. c/o blurry vision He has Hypothyroidism . He is taking his meds and supplements. He takes Synthroid in morning with coffee and sandwich.He fell 2 weeks ago after feeling dizzy and hurt his neck and cut his right hand. Tdap status unknown. His BP HAS BEEN NOTED TO BE ELEVATED IN DOCTOR'S OFFICE. hE SAW HEMATOLOGY AND HAD BLOOD WORK DONE SHOWING IMPROVEMENT IN HIS ANEMIA. [FreeTextEntry1] : f/u hypothyroidism.

## 2019-07-24 NOTE — PHYSICAL EXAM
[Well Developed] : well developed [No Acute Distress] : no acute distress [Well Nourished] : well nourished [Normal Sclera/Conjunctiva] : normal sclera/conjunctiva [PERRL] : pupils equal round and reactive to light [Well-Appearing] : well-appearing [Normal Oropharynx] : the oropharynx was normal [EOMI] : extraocular movements intact [Normal Outer Ear/Nose] : the outer ears and nose were normal in appearance [No Lymphadenopathy] : no lymphadenopathy [No JVD] : no jugular venous distention [Thyroid Normal, No Nodules] : the thyroid was normal and there were no nodules present [Supple] : supple [No Respiratory Distress] : no respiratory distress  [Clear to Auscultation] : lungs were clear to auscultation bilaterally [No Accessory Muscle Use] : no accessory muscle use [Normal Rate] : normal rate  [Regular Rhythm] : with a regular rhythm [No Murmur] : no murmur heard [Normal S1, S2] : normal S1 and S2 [No Carotid Bruits] : no carotid bruits [No Abdominal Bruit] : a ~M bruit was not heard ~T in the abdomen [No Varicosities] : no varicosities [Pedal Pulses Present] : the pedal pulses are present [No Edema] : there was no peripheral edema [No Palpable Aorta] : no palpable aorta [No Extremity Clubbing/Cyanosis] : no extremity clubbing/cyanosis [Soft] : abdomen soft [Non-distended] : non-distended [Non Tender] : non-tender [No HSM] : no HSM [No Masses] : no abdominal mass palpated [Normal Bowel Sounds] : normal bowel sounds [No CVA Tenderness] : no CVA  tenderness [Normal Posterior Cervical Nodes] : no posterior cervical lymphadenopathy [Normal Anterior Cervical Nodes] : no anterior cervical lymphadenopathy [No Spinal Tenderness] : no spinal tenderness [No Joint Swelling] : no joint swelling [Grossly Normal Strength/Tone] : grossly normal strength/tone [Coordination Grossly Intact] : coordination grossly intact [No Rash] : no rash [Deep Tendon Reflexes (DTR)] : deep tendon reflexes were 2+ and symmetric [No Focal Deficits] : no focal deficits [Normal Gait] : normal gait [Normal Affect] : the affect was normal [Normal Insight/Judgement] : insight and judgment were intact

## 2019-07-24 NOTE — PLAN
[FreeTextEntry1] : Blood work obtained. continue Synthroid.\par Antibiotic for right hand raised area at site of injury. Heat therapy..\par Go to ER for continued  hand swelling.\par increase BP medicine dose.monitor BP.\par F/u for results.\par F/u hematology re: iron deficiency anemia. levels improved with supplementation of Iron . \par Continue Vitamin D. level improved.\par Continue B 12. \par B 12 shot administered. \par Take Synthroid empty stomach, wait half hour before breakfast. Take iron at lunch time with Vitamin C rich diet. \par F/u eye doctor for blurred vision.

## 2019-07-25 ENCOUNTER — MESSAGE (OUTPATIENT)
Age: 49
End: 2019-07-25

## 2019-07-29 ENCOUNTER — OTHER (OUTPATIENT)
Age: 49
End: 2019-07-29

## 2019-07-30 ENCOUNTER — OTHER (OUTPATIENT)
Age: 49
End: 2019-07-30

## 2019-07-30 ENCOUNTER — MESSAGE (OUTPATIENT)
Age: 49
End: 2019-07-30

## 2019-07-30 LAB
ALBUMIN SERPL ELPH-MCNC: 4.9 G/DL
ALP BLD-CCNC: 108 U/L
ALT SERPL-CCNC: 32 U/L
ANION GAP SERPL CALC-SCNC: 13 MMOL/L
AST SERPL-CCNC: 24 U/L
B BURGDOR IGG+IGM SER QL IB: NORMAL
BILIRUB SERPL-MCNC: 1.2 MG/DL
BUN SERPL-MCNC: 13 MG/DL
CALCIUM SERPL-MCNC: 10.1 MG/DL
CHLORIDE SERPL-SCNC: 103 MMOL/L
CO2 SERPL-SCNC: 24 MMOL/L
CREAT SERPL-MCNC: 1.02 MG/DL
CRP SERPL-MCNC: 0.25 MG/DL
ERYTHROCYTE [SEDIMENTATION RATE] IN BLOOD BY WESTERGREN METHOD: 16 MM/HR
GLUCOSE SERPL-MCNC: 90 MG/DL
MAGNESIUM SERPL-MCNC: 2.3 MG/DL
POTASSIUM SERPL-SCNC: 4.9 MMOL/L
PROT SERPL-MCNC: 7.5 G/DL
SODIUM SERPL-SCNC: 139 MMOL/L
T3FREE SERPL-MCNC: 3.27 PG/ML
T4 FREE SERPL-MCNC: 1.6 NG/DL
TESTOST BND SERPL-MCNC: 8 PG/ML
TESTOST SERPL-MCNC: 449.1 NG/DL
THYROGLOB AB SERPL-ACNC: <20 IU/ML
THYROPEROXIDASE AB SERPL IA-ACNC: <10 IU/ML
TSH SERPL-ACNC: 1.44 UIU/ML

## 2019-07-31 ENCOUNTER — APPOINTMENT (OUTPATIENT)
Dept: UROLOGY | Facility: CLINIC | Age: 49
End: 2019-07-31

## 2019-08-01 ENCOUNTER — RX RENEWAL (OUTPATIENT)
Age: 49
End: 2019-08-01

## 2019-08-02 ENCOUNTER — FORM ENCOUNTER (OUTPATIENT)
Age: 49
End: 2019-08-02

## 2019-08-03 ENCOUNTER — OUTPATIENT (OUTPATIENT)
Dept: OUTPATIENT SERVICES | Facility: HOSPITAL | Age: 49
LOS: 1 days | End: 2019-08-03
Payer: MEDICARE

## 2019-08-03 ENCOUNTER — APPOINTMENT (OUTPATIENT)
Dept: MRI IMAGING | Facility: CLINIC | Age: 49
End: 2019-08-03
Payer: MEDICARE

## 2019-08-03 DIAGNOSIS — M17.10 UNILATERAL PRIMARY OSTEOARTHRITIS, UNSPECIFIED KNEE: Chronic | ICD-10-CM

## 2019-08-03 DIAGNOSIS — Z98.1 ARTHRODESIS STATUS: Chronic | ICD-10-CM

## 2019-08-03 DIAGNOSIS — C64.2 MALIGNANT NEOPLASM OF LEFT KIDNEY, EXCEPT RENAL PELVIS: ICD-10-CM

## 2019-08-03 DIAGNOSIS — R06.83 SNORING: Chronic | ICD-10-CM

## 2019-08-03 DIAGNOSIS — M12.9 ARTHROPATHY, UNSPECIFIED: Chronic | ICD-10-CM

## 2019-08-03 PROCEDURE — 74183 MRI ABD W/O CNTR FLWD CNTR: CPT

## 2019-08-03 PROCEDURE — A9585: CPT

## 2019-08-03 PROCEDURE — 74183 MRI ABD W/O CNTR FLWD CNTR: CPT | Mod: 26

## 2019-08-07 ENCOUNTER — APPOINTMENT (OUTPATIENT)
Dept: UROLOGY | Facility: CLINIC | Age: 49
End: 2019-08-07
Payer: MEDICARE

## 2019-08-07 DIAGNOSIS — N28.89 OTHER SPECIFIED DISORDERS OF KIDNEY AND URETER: ICD-10-CM

## 2019-08-07 PROCEDURE — 99214 OFFICE O/P EST MOD 30 MIN: CPT | Mod: 24

## 2019-08-07 RX ORDER — CEPHALEXIN 500 MG/1
500 CAPSULE ORAL 3 TIMES DAILY
Qty: 21 | Refills: 0 | Status: DISCONTINUED | COMMUNITY
Start: 2019-07-24 | End: 2019-08-07

## 2019-08-08 PROBLEM — N28.89 RENAL MASS, LEFT: Status: ACTIVE | Noted: 2019-02-20
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
## 2019-08-08 NOTE — PHYSICAL EXAM
[General Appearance - Well Developed] : well developed [General Appearance - Well Nourished] : well nourished [General Appearance - In No Acute Distress] : no acute distress [Abdomen Soft] : soft [Abdomen Tenderness] : non-tender [Costovertebral Angle Tenderness] : no ~M costovertebral angle tenderness [Edema] : no peripheral edema [Exaggerated Use Of Accessory Muscles For Inspiration] : no accessory muscle use [Oriented To Time, Place, And Person] : oriented to person, place, and time [Normal Station and Gait] : the gait and station were normal for the patient's age [FreeTextEntry1] : incisions healing well

## 2019-08-08 NOTE — ASSESSMENT
[FreeTextEntry1] : s/p L lap partial for papillary RCC. Recent US with ? of concerning lesion. Obtained MRI that shows no concerning L sided lesions. Suspect lesion seen on US is one of the subcentimeter lesions seen on MRI. This was likely lesion that prompted original CT imaging that showed a 2cm lesion that was ultimately removed and found to be papillary RCC. Discussed at length with pt and his s.o. Pt upset that "everything" was not taken out at initial surgery. Discussed that there is significant risk of removing benign lesions from kidney. He will continue to be surveilled to assess for any new or changing lesions. \par --MRI in 6mo

## 2019-08-12 LAB

## 2019-09-05 ENCOUNTER — RX RENEWAL (OUTPATIENT)
Age: 49
End: 2019-09-05

## 2019-09-24 ENCOUNTER — OTHER (OUTPATIENT)
Age: 49
End: 2019-09-24

## 2019-09-26 ENCOUNTER — RX RENEWAL (OUTPATIENT)
Age: 49
End: 2019-09-26

## 2019-09-27 ENCOUNTER — OUTPATIENT (OUTPATIENT)
Dept: OUTPATIENT SERVICES | Facility: HOSPITAL | Age: 49
LOS: 1 days | Discharge: HOME | End: 2019-09-27

## 2019-09-27 ENCOUNTER — APPOINTMENT (OUTPATIENT)
Dept: HEMATOLOGY ONCOLOGY | Facility: CLINIC | Age: 49
End: 2019-09-27

## 2019-09-27 ENCOUNTER — RESULT REVIEW (OUTPATIENT)
Age: 49
End: 2019-09-27

## 2019-09-27 DIAGNOSIS — M17.10 UNILATERAL PRIMARY OSTEOARTHRITIS, UNSPECIFIED KNEE: Chronic | ICD-10-CM

## 2019-09-27 DIAGNOSIS — M12.9 ARTHROPATHY, UNSPECIFIED: Chronic | ICD-10-CM

## 2019-09-27 DIAGNOSIS — Z98.1 ARTHRODESIS STATUS: Chronic | ICD-10-CM

## 2019-09-27 DIAGNOSIS — R06.83 SNORING: Chronic | ICD-10-CM

## 2019-09-27 DIAGNOSIS — D64.9 ANEMIA, UNSPECIFIED: ICD-10-CM

## 2019-09-27 LAB
BASOPHILS # BLD AUTO: 0 K/UL — SIGNIFICANT CHANGE UP (ref 0–0.2)
BASOPHILS NFR BLD AUTO: 0.5 % — SIGNIFICANT CHANGE UP (ref 0–2)
EOSINOPHIL # BLD AUTO: 0.2 K/UL — SIGNIFICANT CHANGE UP (ref 0–0.5)
EOSINOPHIL NFR BLD AUTO: 2.6 % — SIGNIFICANT CHANGE UP (ref 0–6)
FERRITIN SERPL-MCNC: 73 NG/ML
FOLATE SERPL-MCNC: 6.6 NG/ML
HCT VFR BLD CALC: 43 % — SIGNIFICANT CHANGE UP (ref 39–50)
HGB BLD-MCNC: 14.3 G/DL — SIGNIFICANT CHANGE UP (ref 13–17)
IRON SATN MFR SERPL: 17 %
IRON SERPL-MCNC: 56 UG/DL
LYMPHOCYTES # BLD AUTO: 1.7 K/UL — SIGNIFICANT CHANGE UP (ref 1–3.3)
LYMPHOCYTES # BLD AUTO: 23.2 % — SIGNIFICANT CHANGE UP (ref 13–44)
MCHC RBC-ENTMCNC: 29.8 PG — SIGNIFICANT CHANGE UP (ref 27–34)
MCHC RBC-ENTMCNC: 33.2 G/DL — SIGNIFICANT CHANGE UP (ref 32–36)
MCV RBC AUTO: 89.9 FL — SIGNIFICANT CHANGE UP (ref 80–100)
MONOCYTES # BLD AUTO: 0.6 K/UL — SIGNIFICANT CHANGE UP (ref 0–0.9)
MONOCYTES NFR BLD AUTO: 8.6 % — SIGNIFICANT CHANGE UP (ref 2–14)
NEUTROPHILS # BLD AUTO: 4.7 K/UL — SIGNIFICANT CHANGE UP (ref 1.8–7.4)
NEUTROPHILS NFR BLD AUTO: 65.1 % — SIGNIFICANT CHANGE UP (ref 43–77)
PLATELET # BLD AUTO: 273 K/UL — SIGNIFICANT CHANGE UP (ref 150–400)
RBC # BLD: 4.78 M/UL — SIGNIFICANT CHANGE UP (ref 4.2–5.8)
RBC # FLD: 12.2 % — SIGNIFICANT CHANGE UP (ref 10.3–14.5)
TIBC SERPL-MCNC: 339 UG/DL
UIBC SERPL-MCNC: 283 UG/DL
VIT B12 SERPL-MCNC: 931 PG/ML
WBC # BLD: 7.2 K/UL — SIGNIFICANT CHANGE UP (ref 3.8–10.5)
WBC # FLD AUTO: 7.2 K/UL — SIGNIFICANT CHANGE UP (ref 3.8–10.5)

## 2019-09-30 ENCOUNTER — RX RENEWAL (OUTPATIENT)
Age: 49
End: 2019-09-30

## 2019-10-08 ENCOUNTER — TRANSCRIPTION ENCOUNTER (OUTPATIENT)
Age: 49
End: 2019-10-08

## 2019-10-08 ENCOUNTER — OTHER (OUTPATIENT)
Age: 49
End: 2019-10-08

## 2019-10-21 ENCOUNTER — APPOINTMENT (OUTPATIENT)
Dept: HEMATOLOGY ONCOLOGY | Facility: CLINIC | Age: 49
End: 2019-10-21

## 2019-12-10 ENCOUNTER — RX RENEWAL (OUTPATIENT)
Age: 49
End: 2019-12-10

## 2019-12-13 ENCOUNTER — APPOINTMENT (OUTPATIENT)
Age: 49
End: 2019-12-13
Payer: MEDICARE

## 2019-12-13 VITALS
HEIGHT: 68 IN | WEIGHT: 223 LBS | SYSTOLIC BLOOD PRESSURE: 126 MMHG | DIASTOLIC BLOOD PRESSURE: 82 MMHG | OXYGEN SATURATION: 99 % | HEART RATE: 59 BPM | TEMPERATURE: 98.2 F | BODY MASS INDEX: 33.8 KG/M2

## 2019-12-13 DIAGNOSIS — Z87.440 PERSONAL HISTORY OF URINARY (TRACT) INFECTIONS: ICD-10-CM

## 2019-12-13 PROCEDURE — 36415 COLL VENOUS BLD VENIPUNCTURE: CPT

## 2019-12-13 PROCEDURE — 99214 OFFICE O/P EST MOD 30 MIN: CPT | Mod: 25

## 2019-12-14 LAB
25(OH)D3 SERPL-MCNC: 37 NG/ML
ALBUMIN SERPL ELPH-MCNC: 4.8 G/DL
ALP BLD-CCNC: 106 U/L
ALT SERPL-CCNC: 27 U/L
ANION GAP SERPL CALC-SCNC: 14 MMOL/L
APPEARANCE: CLEAR
AST SERPL-CCNC: 18 U/L
BACTERIA UR CULT: NORMAL
BACTERIA: NEGATIVE
BASOPHILS # BLD AUTO: 0.04 K/UL
BASOPHILS NFR BLD AUTO: 0.5 %
BILIRUB SERPL-MCNC: 1.1 MG/DL
BILIRUBIN URINE: NEGATIVE
BLOOD URINE: NEGATIVE
BUN SERPL-MCNC: 16 MG/DL
CALCIUM SERPL-MCNC: 10.1 MG/DL
CHLORIDE SERPL-SCNC: 103 MMOL/L
CHOLEST SERPL-MCNC: 189 MG/DL
CHOLEST/HDLC SERPL: 3.2 RATIO
CO2 SERPL-SCNC: 24 MMOL/L
COLOR: YELLOW
CREAT SERPL-MCNC: 0.99 MG/DL
CREAT SPEC-SCNC: 118 MG/DL
EOSINOPHIL # BLD AUTO: 0.14 K/UL
EOSINOPHIL NFR BLD AUTO: 1.8 %
ESTIMATED AVERAGE GLUCOSE: 111 MG/DL
FERRITIN SERPL-MCNC: 135 NG/ML
FOLATE SERPL-MCNC: 7.3 NG/ML
GLUCOSE QUALITATIVE U: NEGATIVE
GLUCOSE SERPL-MCNC: 97 MG/DL
HBA1C MFR BLD HPLC: 5.5 %
HCT VFR BLD CALC: 45.6 %
HDLC SERPL-MCNC: 59 MG/DL
HGB BLD-MCNC: 14.6 G/DL
HYALINE CASTS: 0 /LPF
IMM GRANULOCYTES NFR BLD AUTO: 0.4 %
IRON SATN MFR SERPL: 25 %
IRON SERPL-MCNC: 80 UG/DL
KETONES URINE: NEGATIVE
LDLC SERPL CALC-MCNC: 108 MG/DL
LEUKOCYTE ESTERASE URINE: NEGATIVE
LYMPHOCYTES # BLD AUTO: 1.53 K/UL
LYMPHOCYTES NFR BLD AUTO: 20.2 %
MAN DIFF?: NORMAL
MCHC RBC-ENTMCNC: 29.6 PG
MCHC RBC-ENTMCNC: 32 GM/DL
MCV RBC AUTO: 92.5 FL
MICROALBUMIN 24H UR DL<=1MG/L-MCNC: <1.2 MG/DL
MICROALBUMIN/CREAT 24H UR-RTO: NORMAL MG/G
MICROSCOPIC-UA: NORMAL
MONOCYTES # BLD AUTO: 0.64 K/UL
MONOCYTES NFR BLD AUTO: 8.4 %
NEUTROPHILS # BLD AUTO: 5.21 K/UL
NEUTROPHILS NFR BLD AUTO: 68.7 %
NITRITE URINE: NEGATIVE
PH URINE: 6
PLATELET # BLD AUTO: 293 K/UL
POTASSIUM SERPL-SCNC: 4.9 MMOL/L
PROT SERPL-MCNC: 7.2 G/DL
PROTEIN URINE: NEGATIVE
RBC # BLD: 4.93 M/UL
RBC # FLD: 12.7 %
RED BLOOD CELLS URINE: 1 /HPF
SODIUM SERPL-SCNC: 141 MMOL/L
SPECIFIC GRAVITY URINE: 1.02
SQUAMOUS EPITHELIAL CELLS: 0 /HPF
T3FREE SERPL-MCNC: 3.7 PG/ML
T4 FREE SERPL-MCNC: 1.6 NG/DL
TIBC SERPL-MCNC: 322 UG/DL
TRIGL SERPL-MCNC: 109 MG/DL
TSH SERPL-ACNC: 1.62 UIU/ML
UIBC SERPL-MCNC: 242 UG/DL
UROBILINOGEN URINE: NORMAL
VIT B12 SERPL-MCNC: 872 PG/ML
WBC # FLD AUTO: 7.59 K/UL
WHITE BLOOD CELLS URINE: 2 /HPF

## 2019-12-23 ENCOUNTER — RX RENEWAL (OUTPATIENT)
Age: 49
End: 2019-12-23

## 2020-01-03 LAB
TESTOST BND SERPL-MCNC: 11.5 PG/ML
TESTOST SERPL-MCNC: 422.7 NG/DL

## 2020-01-06 ENCOUNTER — RX RENEWAL (OUTPATIENT)
Age: 50
End: 2020-01-06

## 2020-02-07 ENCOUNTER — FORM ENCOUNTER (OUTPATIENT)
Age: 50
End: 2020-02-07

## 2020-02-08 ENCOUNTER — APPOINTMENT (OUTPATIENT)
Dept: MRI IMAGING | Facility: CLINIC | Age: 50
End: 2020-02-08
Payer: MEDICARE

## 2020-02-08 ENCOUNTER — OUTPATIENT (OUTPATIENT)
Dept: OUTPATIENT SERVICES | Facility: HOSPITAL | Age: 50
LOS: 1 days | End: 2020-02-08
Payer: COMMERCIAL

## 2020-02-08 DIAGNOSIS — M17.10 UNILATERAL PRIMARY OSTEOARTHRITIS, UNSPECIFIED KNEE: Chronic | ICD-10-CM

## 2020-02-08 DIAGNOSIS — R06.83 SNORING: Chronic | ICD-10-CM

## 2020-02-08 DIAGNOSIS — Z00.8 ENCOUNTER FOR OTHER GENERAL EXAMINATION: ICD-10-CM

## 2020-02-08 DIAGNOSIS — M12.9 ARTHROPATHY, UNSPECIFIED: Chronic | ICD-10-CM

## 2020-02-08 DIAGNOSIS — Z98.1 ARTHRODESIS STATUS: Chronic | ICD-10-CM

## 2020-02-08 PROCEDURE — 74183 MRI ABD W/O CNTR FLWD CNTR: CPT

## 2020-02-08 PROCEDURE — A9585: CPT

## 2020-02-08 PROCEDURE — 74183 MRI ABD W/O CNTR FLWD CNTR: CPT | Mod: 26

## 2020-03-25 ENCOUNTER — RX RENEWAL (OUTPATIENT)
Age: 50
End: 2020-03-25

## 2020-04-01 ENCOUNTER — RX RENEWAL (OUTPATIENT)
Age: 50
End: 2020-04-01

## 2020-04-23 NOTE — PHYSICAL EXAM
[No Acute Distress] : no acute distress [Well Nourished] : well nourished [Well-Appearing] : well-appearing [Well Developed] : well developed [Normal Sclera/Conjunctiva] : normal sclera/conjunctiva [EOMI] : extraocular movements intact [PERRL] : pupils equal round and reactive to light [No JVD] : no jugular venous distention [Normal Oropharynx] : the oropharynx was normal [Normal Outer Ear/Nose] : the outer ears and nose were normal in appearance [Supple] : supple [No Lymphadenopathy] : no lymphadenopathy [No Accessory Muscle Use] : no accessory muscle use [Thyroid Normal, No Nodules] : the thyroid was normal and there were no nodules present [No Respiratory Distress] : no respiratory distress  [Normal Rate] : normal rate  [Regular Rhythm] : with a regular rhythm [Clear to Auscultation] : lungs were clear to auscultation bilaterally [No Murmur] : no murmur heard [Normal S1, S2] : normal S1 and S2 [No Abdominal Bruit] : a ~M bruit was not heard ~T in the abdomen [No Carotid Bruits] : no carotid bruits [Pedal Pulses Present] : the pedal pulses are present [No Varicosities] : no varicosities [No Edema] : there was no peripheral edema [No Palpable Aorta] : no palpable aorta [No Extremity Clubbing/Cyanosis] : no extremity clubbing/cyanosis [Soft] : abdomen soft [Non Tender] : non-tender [Non-distended] : non-distended [No Masses] : no abdominal mass palpated [No HSM] : no HSM [Normal Posterior Cervical Nodes] : no posterior cervical lymphadenopathy [Normal Bowel Sounds] : normal bowel sounds [Normal Anterior Cervical Nodes] : no anterior cervical lymphadenopathy [No CVA Tenderness] : no CVA  tenderness [No Spinal Tenderness] : no spinal tenderness [No Joint Swelling] : no joint swelling [Grossly Normal Strength/Tone] : grossly normal strength/tone [No Rash] : no rash [Coordination Grossly Intact] : coordination grossly intact [No Focal Deficits] : no focal deficits [Normal Gait] : normal gait [Deep Tendon Reflexes (DTR)] : deep tendon reflexes were 2+ and symmetric [Normal Affect] : the affect was normal [Normal Insight/Judgement] : insight and judgment were intact

## 2020-04-23 NOTE — HEALTH RISK ASSESSMENT
[] : No [No] : In the past 12 months have you used drugs other than those required for medical reasons? No [No falls in past year] : Patient reported no falls in the past year [[id number]] : 0 [0] : 1) Little interest or pleasure doing things: Not at all (0)

## 2020-04-23 NOTE — PLAN
[FreeTextEntry1] : labs obtained. \par  asthma: needs pfts, inhaler refilled. \par  low salt diet.\par  f/u urology.\par  continue current meds, Synthroid. Metoprolol.

## 2020-04-23 NOTE — HISTORY OF PRESENT ILLNESS
[FreeTextEntry8] : Here for f/u HTN and Hypothyroidism, Hyperlipidemia.  He has asthma an needs inhaler refill. No chest pain, nausea, vomiting, diarrhea, dizziness. He is taking his meds regularly. Patient has anxiety. He has CA kidney and follows urology. No urinary symptoms.

## 2020-06-09 ENCOUNTER — APPOINTMENT (OUTPATIENT)
Dept: ALLERGY | Facility: CLINIC | Age: 50
End: 2020-06-09
Payer: MEDICARE

## 2020-06-09 VITALS
BODY MASS INDEX: 30.92 KG/M2 | HEART RATE: 68 BPM | SYSTOLIC BLOOD PRESSURE: 124 MMHG | WEIGHT: 204 LBS | OXYGEN SATURATION: 97 % | RESPIRATION RATE: 16 BRPM | TEMPERATURE: 98.5 F | DIASTOLIC BLOOD PRESSURE: 74 MMHG | HEIGHT: 68 IN

## 2020-06-09 PROCEDURE — 99204 OFFICE O/P NEW MOD 45 MIN: CPT | Mod: 25

## 2020-06-09 PROCEDURE — 95004 PERQ TESTS W/ALRGNC XTRCS: CPT

## 2020-06-09 PROCEDURE — 95018 ALL TSTG PERQ&IQ DRUGS/BIOL: CPT

## 2020-06-09 RX ORDER — OSELTAMIVIR PHOSPHATE 75 MG/1
75 CAPSULE ORAL TWICE DAILY
Qty: 10 | Refills: 0 | Status: DISCONTINUED | COMMUNITY
Start: 2020-01-06 | End: 2020-06-09

## 2020-06-10 NOTE — HISTORY OF PRESENT ILLNESS
[Eczematous rashes] : eczematous rashes [Food Allergies] : food allergies [Venom Reactions] : venom reactions [de-identified] : Patient had ACL reconstruction - back surgery - shoulder surgery - palate and tonsils removed - DNS repaired - left kidney surgery for cancer performed 5/19 - patient developed hypertension treated with metoprolol.   Since the surgery - coughing - wheezing - he was treated with Ventolin - using medication 4-5x per day.   He traveled to [place] in January and upon return he had a URI - coughing - he lost 20 pounds with diet.   Now he has worsening breathing with strong odors - cat exposure - various food products - coughing and wheezing -  he uses his rescue inhaler over 5 x per day now - he is getting anxious. \par \par No history of asthma as a child.   He has a history of seasonal allergies - he did not see a doctor for this.

## 2020-06-10 NOTE — PHYSICAL EXAM
[Alert] : alert [Well Nourished] : well nourished [Healthy Appearance] : healthy appearance [No Acute Distress] : no acute distress [Well Developed] : well developed [Normal Pupil & Iris Size/Symmetry] : normal pupil and iris size and symmetry [No Discharge] : no discharge [Sclera Not Icteric] : sclera not icteric [Normal Lips/Tongue] : the lips and tongue were normal [Normal Nasal Mucosa] : the nasal mucosa was normal [Normal Tonsils] : normal tonsils [Normal Dentition] : normal dentition [No Oral Lesions or Ulcers] : no oral lesions or ulcers [No Neck Mass] : no neck mass was observed [No LAD] : no lymphadenopathy [No Thyroid Mass] : no thyroid mass [Supple] : the neck was supple [Normal Palpation] : palpation of the chest revealed no abnormalities [Normal Rate and Effort] : normal respiratory rhythm and effort [No Retractions] : no retractions [No Crackles] : no crackles [Bilateral Audible Breath Sounds] : bilateral audible breath sounds [Normal S1, S2] : normal S1 and S2 [Normal Rate] : heart rate was normal  [No murmur] : no murmur [Regular Rhythm] : with a regular rhythm [Normal Axillary Lumph Nodes] : axillary [Normal Cervical Lymph Nodes] : cervical [No Rash] : no rash [Skin Intact] : skin intact  [No Skin Lesions] : no skin lesions [No Joint Swelling or Erythema] : no joint swelling or erythema [No clubbing] : no clubbing [No Edema] : no edema [No Cyanosis] : no cyanosis [Normal Mood] : mood was normal [Normal Affect] : affect was normal [Alert, Awake, Oriented as Age-Appropriate] : alert, awake, oriented as age appropriate [Boggy Nasal Turbinates] : no boggy and/or pale nasal turbinates [Posterior Pharyngeal Cobblestoning] : no posterior pharyngeal cobblestoning [de-identified] : S/P  palate surgery

## 2020-06-10 NOTE — SOCIAL HISTORY
[Radiator/Baseboard] : heating provided by radiator(s)/baseboard(s) [House] : [unfilled] lives in a house  [Central] : air conditioning provided by central unit [None] : none [] :  [de-identified] : lives alone [FreeTextEntry2] : [employer] properties  [Bedroom] : not in the bedroom [Basement] : not in the basement [Smokers in Household] : there are no smokers in the home [Living Area] : not in the living area [de-identified] : shahram has a dog/cat

## 2020-06-10 NOTE — ASSESSMENT
[FreeTextEntry1] : Moderate persistent asthma:\par \par Breo 200 QD\par Ventolin 2 puffs QID prn \par \par RV environmental intradermal skin testing\par RV food skin testing

## 2020-06-10 NOTE — IMPRESSION
[Allergy Testing Dust Mite] : dust mites [Allergy Testing Cockroach] : cockroach [Allergy Testing Cat] : cat [Allergy Testing Dog] : dog [] : molds [Allergy Testing Trees] : trees [Allergy Testing Weeds] : weeds [Allergy Testing Grasses] : grasses

## 2020-06-16 ENCOUNTER — APPOINTMENT (OUTPATIENT)
Dept: ALLERGY | Facility: CLINIC | Age: 50
End: 2020-06-16
Payer: MEDICARE

## 2020-06-16 PROCEDURE — 99213 OFFICE O/P EST LOW 20 MIN: CPT | Mod: 25

## 2020-06-16 PROCEDURE — 95018 ALL TSTG PERQ&IQ DRUGS/BIOL: CPT

## 2020-06-16 PROCEDURE — 95024 IQ TESTS W/ALLERGENIC XTRCS: CPT

## 2020-06-16 NOTE — ASSESSMENT
[FreeTextEntry1] : Moderate persistent asthma:\par \par Advair 250/50 BID \par Continue Ventolin 2 puffs QID prn \par RV food allergy skin testing

## 2020-06-16 NOTE — SOCIAL HISTORY
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

## 2020-06-16 NOTE — PHYSICAL EXAM
[Alert] : alert [Healthy Appearance] : healthy appearance [Well Nourished] : well nourished [No Acute Distress] : no acute distress [Well Developed] : well developed [Normal Pupil & Iris Size/Symmetry] : normal pupil and iris size and symmetry [No Discharge] : no discharge [Sclera Not Icteric] : sclera not icteric [Normal Lips/Tongue] : the lips and tongue were normal [Normal Nasal Mucosa] : the nasal mucosa was normal [Normal Dentition] : normal dentition [Normal Tonsils] : normal tonsils [No Oral Lesions or Ulcers] : no oral lesions or ulcers [No Thyroid Mass] : no thyroid mass [No Neck Mass] : no neck mass was observed [No LAD] : no lymphadenopathy [Normal Rate and Effort] : normal respiratory rhythm and effort [Supple] : the neck was supple [No Crackles] : no crackles [Normal Palpation] : palpation of the chest revealed no abnormalities [No Retractions] : no retractions [Bilateral Audible Breath Sounds] : bilateral audible breath sounds [Normal Rate] : heart rate was normal  [No murmur] : no murmur [Normal S1, S2] : normal S1 and S2 [Regular Rhythm] : with a regular rhythm [Normal Cervical Lymph Nodes] : cervical [Skin Intact] : skin intact  [Normal Axillary Lumph Nodes] : axillary [No Rash] : no rash [No Skin Lesions] : no skin lesions [No Joint Swelling or Erythema] : no joint swelling or erythema [No clubbing] : no clubbing [No Cyanosis] : no cyanosis [No Edema] : no edema [Normal Mood] : mood was normal [Normal Affect] : affect was normal [Alert, Awake, Oriented as Age-Appropriate] : alert, awake, oriented as age appropriate [Boggy Nasal Turbinates] : no boggy and/or pale nasal turbinates [Posterior Pharyngeal Cobblestoning] : no posterior pharyngeal cobblestoning [de-identified] : S/P  palate surgery

## 2020-06-16 NOTE — HISTORY OF PRESENT ILLNESS
[Eczematous rashes] : eczematous rashes [Venom Reactions] : venom reactions [Food Allergies] : food allergies [de-identified] : Patient did not get Breo filled secondary to cost of medication - he continues to use his rescue inhaler 3-4 x per day.

## 2020-06-23 ENCOUNTER — APPOINTMENT (OUTPATIENT)
Dept: ALLERGY | Facility: CLINIC | Age: 50
End: 2020-06-23
Payer: MEDICARE

## 2020-06-23 PROCEDURE — 95018 ALL TSTG PERQ&IQ DRUGS/BIOL: CPT

## 2020-06-23 PROCEDURE — 95004 PERQ TESTS W/ALRGNC XTRCS: CPT

## 2020-06-23 PROCEDURE — 99213 OFFICE O/P EST LOW 20 MIN: CPT | Mod: 25

## 2020-06-23 NOTE — REASON FOR VISIT
[Routine Follow-Up] : a routine follow-up visit for [FreeTextEntry3] : patient being seen for food skin testing

## 2020-06-23 NOTE — PHYSICAL EXAM
[Well Nourished] : well nourished [Alert] : alert [Healthy Appearance] : healthy appearance [Well Developed] : well developed [No Acute Distress] : no acute distress [Normal Pupil & Iris Size/Symmetry] : normal pupil and iris size and symmetry [No Discharge] : no discharge [Sclera Not Icteric] : sclera not icteric [Normal Lips/Tongue] : the lips and tongue were normal [Normal Nasal Mucosa] : the nasal mucosa was normal [Normal Tonsils] : normal tonsils [No Oral Lesions or Ulcers] : no oral lesions or ulcers [Normal Dentition] : normal dentition [Boggy Nasal Turbinates] : no boggy and/or pale nasal turbinates [Posterior Pharyngeal Cobblestoning] : no posterior pharyngeal cobblestoning [No LAD] : no lymphadenopathy [No Neck Mass] : no neck mass was observed [No Thyroid Mass] : no thyroid mass [Normal Rate and Effort] : normal respiratory rhythm and effort [Supple] : the neck was supple [Normal Palpation] : palpation of the chest revealed no abnormalities [No Crackles] : no crackles [No Retractions] : no retractions [Bilateral Audible Breath Sounds] : bilateral audible breath sounds [Normal Rate] : heart rate was normal  [Normal S1, S2] : normal S1 and S2 [No murmur] : no murmur [Regular Rhythm] : with a regular rhythm [Normal Axillary Lumph Nodes] : axillary [Normal Cervical Lymph Nodes] : cervical [Skin Intact] : skin intact  [No Rash] : no rash [No Skin Lesions] : no skin lesions [No Joint Swelling or Erythema] : no joint swelling or erythema [No clubbing] : no clubbing [No Edema] : no edema [No Cyanosis] : no cyanosis [Normal Mood] : mood was normal [Normal Affect] : affect was normal [Alert, Awake, Oriented as Age-Appropriate] : alert, awake, oriented as age appropriate [de-identified] : S/P  palate surgery

## 2020-06-23 NOTE — ASSESSMENT
[FreeTextEntry1] : Moderate persistent asthma:\par \par Continue Advair 250/50 BID\par Ventolin 2 puffs QID prn \par No food allergies\par RV 3 months or prn

## 2020-06-23 NOTE — SOCIAL HISTORY
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

## 2020-06-23 NOTE — HISTORY OF PRESENT ILLNESS
[de-identified] : Patient being seen for food skin testing and re-evaluation of his asthma.  He has been under excellent control and no longer using his rescue inhaler

## 2020-07-02 ENCOUNTER — RX RENEWAL (OUTPATIENT)
Age: 50
End: 2020-07-02

## 2020-07-17 NOTE — ED ADULT NURSE NOTE - MUSCULOSKELETAL WDL
Full range of motion of upper and lower extremities, no joint tenderness/swelling.
[facility]

## 2020-07-22 ENCOUNTER — RX RENEWAL (OUTPATIENT)
Age: 50
End: 2020-07-22

## 2020-07-29 ENCOUNTER — RX RENEWAL (OUTPATIENT)
Age: 50
End: 2020-07-29

## 2020-08-07 ENCOUNTER — RX RENEWAL (OUTPATIENT)
Age: 50
End: 2020-08-07

## 2020-08-19 ENCOUNTER — EMERGENCY (EMERGENCY)
Facility: HOSPITAL | Age: 50
LOS: 1 days | Discharge: HOME | End: 2020-08-19
Attending: EMERGENCY MEDICINE | Admitting: EMERGENCY MEDICINE
Payer: MEDICARE

## 2020-08-19 VITALS
TEMPERATURE: 98 F | RESPIRATION RATE: 20 BRPM | DIASTOLIC BLOOD PRESSURE: 89 MMHG | SYSTOLIC BLOOD PRESSURE: 151 MMHG | OXYGEN SATURATION: 97 % | HEART RATE: 84 BPM | HEIGHT: 70 IN | WEIGHT: 199.96 LBS

## 2020-08-19 VITALS
HEART RATE: 74 BPM | SYSTOLIC BLOOD PRESSURE: 144 MMHG | RESPIRATION RATE: 18 BRPM | OXYGEN SATURATION: 97 % | DIASTOLIC BLOOD PRESSURE: 80 MMHG | TEMPERATURE: 98 F

## 2020-08-19 DIAGNOSIS — R06.83 SNORING: Chronic | ICD-10-CM

## 2020-08-19 DIAGNOSIS — M12.9 ARTHROPATHY, UNSPECIFIED: Chronic | ICD-10-CM

## 2020-08-19 DIAGNOSIS — M17.10 UNILATERAL PRIMARY OSTEOARTHRITIS, UNSPECIFIED KNEE: Chronic | ICD-10-CM

## 2020-08-19 DIAGNOSIS — Z98.1 ARTHRODESIS STATUS: Chronic | ICD-10-CM

## 2020-08-19 PROCEDURE — 29515 APPLICATION SHORT LEG SPLINT: CPT | Mod: LT

## 2020-08-19 PROCEDURE — 99284 EMERGENCY DEPT VISIT MOD MDM: CPT | Mod: 25

## 2020-08-19 PROCEDURE — 99283 EMERGENCY DEPT VISIT LOW MDM: CPT

## 2020-08-19 PROCEDURE — 73630 X-RAY EXAM OF FOOT: CPT | Mod: 26,LT

## 2020-08-19 PROCEDURE — 73630 X-RAY EXAM OF FOOT: CPT

## 2020-08-19 PROCEDURE — 71101 X-RAY EXAM UNILAT RIBS/CHEST: CPT

## 2020-08-19 PROCEDURE — 71101 X-RAY EXAM UNILAT RIBS/CHEST: CPT | Mod: 26

## 2020-08-19 RX ORDER — IBUPROFEN 200 MG
600 TABLET ORAL ONCE
Refills: 0 | Status: COMPLETED | OUTPATIENT
Start: 2020-08-19 | End: 2020-08-19

## 2020-08-19 RX ORDER — MOXIFLOXACIN HYDROCHLORIDE TABLETS, 400 MG 400 MG/1
1 TABLET, FILM COATED ORAL
Qty: 0 | Refills: 0 | DISCHARGE

## 2020-08-19 RX ADMIN — Medication 600 MILLIGRAM(S): at 01:29

## 2020-08-19 RX ADMIN — Medication 600 MILLIGRAM(S): at 01:35

## 2020-08-19 NOTE — ED ADULT NURSE NOTE - NSIMPLEMENTINTERV_GEN_ALL_ED
Implemented All Fall Risk Interventions:  [place] to call system. Call bell, personal items and telephone within reach. Instruct patient to call for assistance. Room bathroom lighting operational. Non-slip footwear when patient is off stretcher. Physically safe environment: no spills, clutter or unnecessary equipment. Stretcher in lowest position, wheels locked, appropriate side rails in place. Provide visual cue, wrist band, yellow gown, etc. Monitor gait and stability. Monitor for mental status changes and reorient to person, place, and time. Review medications for side effects contributing to fall risk. Reinforce activity limits and safety measures with patient and family.
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
## 2020-08-19 NOTE — ED PROVIDER NOTE - CARE PLAN
Principal Discharge DX:	Fracture of first metatarsal bone of left foot Principal Discharge DX:	Fracture of first metatarsal bone of left foot  Secondary Diagnosis:	Rib contusion, left, initial encounter

## 2020-08-19 NOTE — ED PROVIDER NOTE - CARE PROVIDER_API CALL
Mil Toth  PODIATRIC MEDICINE AND SURGERY  [address] [place] [address]  Phone: [phone]  Fax: [phone]  Follow Up Time:

## 2020-08-19 NOTE — ED ADULT NURSE REASSESSMENT NOTE - NS ED NURSE REASSESS COMMENT FT1
md aware of all results. pt medicated as ordered. splint applied by md. pt instructed on use of crutches and returned demonstration of same. d/c to self. NAD

## 2020-08-19 NOTE — ED PROVIDER NOTE - PATIENT PORTAL LINK FT
You can access the FollowMyHealth Patient Portal offered by [facility] by registering at the following website: [email or web address]. By joining [employer]’s FollowMyHealth portal, you will also be able to view your health information using other applications (apps) compatible with our system.

## 2020-08-19 NOTE — ED ADULT NURSE NOTE - CHPI ED NUR SYMPTOMS NEG
no chills/no decreased eating/drinking/no dizziness/no nausea/no vomiting/no weakness/no tingling/no fever
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
## 2020-08-19 NOTE — ED ADULT NURSE NOTE - PMH
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

## 2020-08-26 ENCOUNTER — RX RENEWAL (OUTPATIENT)
Age: 50
End: 2020-08-26

## 2020-09-09 ENCOUNTER — APPOINTMENT (OUTPATIENT)
Dept: FAMILY MEDICINE | Facility: CLINIC | Age: 50
End: 2020-09-09

## 2020-09-14 ENCOUNTER — APPOINTMENT (OUTPATIENT)
Dept: RADIOLOGY | Facility: HOSPITAL | Age: 50
End: 2020-09-14

## 2020-09-14 ENCOUNTER — OUTPATIENT (OUTPATIENT)
Dept: OUTPATIENT SERVICES | Facility: HOSPITAL | Age: 50
LOS: 1 days | End: 2020-09-14
Payer: MEDICARE

## 2020-09-14 DIAGNOSIS — R06.83 SNORING: Chronic | ICD-10-CM

## 2020-09-14 DIAGNOSIS — M17.10 UNILATERAL PRIMARY OSTEOARTHRITIS, UNSPECIFIED KNEE: Chronic | ICD-10-CM

## 2020-09-14 DIAGNOSIS — M12.9 ARTHROPATHY, UNSPECIFIED: Chronic | ICD-10-CM

## 2020-09-14 DIAGNOSIS — Z00.8 ENCOUNTER FOR OTHER GENERAL EXAMINATION: ICD-10-CM

## 2020-09-14 DIAGNOSIS — Z98.1 ARTHRODESIS STATUS: Chronic | ICD-10-CM

## 2020-09-14 PROCEDURE — 73630 X-RAY EXAM OF FOOT: CPT

## 2020-09-14 PROCEDURE — 73630 X-RAY EXAM OF FOOT: CPT | Mod: 26,LT

## 2020-09-18 ENCOUNTER — RX RENEWAL (OUTPATIENT)
Age: 50
End: 2020-09-18

## 2020-09-28 ENCOUNTER — APPOINTMENT (OUTPATIENT)
Dept: FAMILY MEDICINE | Facility: CLINIC | Age: 50
End: 2020-09-28
Payer: MEDICARE

## 2020-09-28 VITALS
HEART RATE: 58 BPM | TEMPERATURE: 98.1 F | HEIGHT: 68 IN | RESPIRATION RATE: 14 BRPM | WEIGHT: 212 LBS | OXYGEN SATURATION: 98 % | DIASTOLIC BLOOD PRESSURE: 78 MMHG | SYSTOLIC BLOOD PRESSURE: 114 MMHG | BODY MASS INDEX: 32.13 KG/M2

## 2020-09-28 DIAGNOSIS — R73.09 OTHER ABNORMAL GLUCOSE: ICD-10-CM

## 2020-09-28 DIAGNOSIS — Z87.898 PERSONAL HISTORY OF OTHER SPECIFIED CONDITIONS: ICD-10-CM

## 2020-09-28 DIAGNOSIS — Z12.5 ENCOUNTER FOR SCREENING FOR MALIGNANT NEOPLASM OF PROSTATE: ICD-10-CM

## 2020-09-28 DIAGNOSIS — R04.0 EPISTAXIS: ICD-10-CM

## 2020-09-28 PROCEDURE — 99215 OFFICE O/P EST HI 40 MIN: CPT | Mod: 25

## 2020-09-28 PROCEDURE — 36415 COLL VENOUS BLD VENIPUNCTURE: CPT

## 2020-09-28 RX ORDER — CHLORHEXIDINE GLUCONATE 4 %
325 (65 FE) LIQUID (ML) TOPICAL
Qty: 30 | Refills: 2 | Status: DISCONTINUED | COMMUNITY
Start: 2019-07-03 | End: 2020-09-28

## 2020-09-28 RX ORDER — CHLORHEXIDINE GLUCONATE 4 %
1000 LIQUID (ML) TOPICAL DAILY
Qty: 30 | Refills: 2 | Status: DISCONTINUED | COMMUNITY
Start: 2019-07-03 | End: 2020-09-28

## 2020-09-28 RX ORDER — QUETIAPINE FUMARATE 50 MG/1
50 TABLET ORAL
Refills: 0 | Status: DISCONTINUED | COMMUNITY
End: 2020-09-28

## 2020-09-28 RX ORDER — ALBUTEROL SULFATE 90 UG/1
108 (90 BASE) AEROSOL, METERED RESPIRATORY (INHALATION)
Qty: 1 | Refills: 3 | Status: DISCONTINUED | COMMUNITY
Start: 2019-12-13 | End: 2020-09-28

## 2020-09-28 RX ORDER — FLUTICASONE PROPIONATE AND SALMETEROL 250; 50 UG/1; UG/1
250-50 POWDER RESPIRATORY (INHALATION)
Qty: 1 | Refills: 3 | Status: DISCONTINUED | COMMUNITY
Start: 2020-07-22 | End: 2020-09-28

## 2020-09-28 RX ORDER — OMEGA-3/DHA/EPA/FISH OIL 35-113.5MG
1000 TABLET,CHEWABLE ORAL
Qty: 30 | Refills: 2 | Status: DISCONTINUED | COMMUNITY
Start: 2019-09-30 | End: 2020-09-28

## 2020-09-28 NOTE — HEALTH RISK ASSESSMENT
[No] : In the past 12 months have you used drugs other than those required for medical reasons? No [Patient reported colonoscopy was normal] : Patient reported colonoscopy was normal [HIV test declined] : HIV test declined [Hepatitis C test declined] : Hepatitis C test declined [None] : None [Employed] : employed [Sexually Active] : sexually active [Feels Safe at Home] : Feels safe at home [Fully functional (bathing, dressing, toileting, transferring, walking, feeding)] : Fully functional (bathing, dressing, toileting, transferring, walking, feeding) [Fully functional (using the telephone, shopping, preparing meals, housekeeping, doing laundry, using] : Fully functional and needs no help or supervision to perform IADLs (using the telephone, shopping, preparing meals, housekeeping, doing laundry, using transportation, managing medications and managing finances) [Independent] : managing finances [Reports normal functional visual acuity (ie: able to read med bottle)] : Reports normal functional visual acuity [Smoke Detector] : smoke detector [Carbon Monoxide Detector] : carbon monoxide detector [Seat Belt] :  uses seat belt [Sunscreen] : uses sunscreen [Change in mental status noted] : No change in mental status noted [Language] : denies difficulty with language [Behavior] : denies difficulty with behavior [Learning/Retaining New Information] : denies difficulty learning/retaining new information [Handling Complex Tasks] : denies difficulty handling complex tasks [Reasoning] : denies difficulty with reasoning [Spatial Ability and Orientation] : denies difficulty with spatial ability and orientation [Reports changes in hearing] : Reports no changes in hearing [Reports changes in vision] : Reports no changes in vision [Reports changes in dental health] : Reports no changes in dental health [ColonoscopyDate] : 2019 [ColonoscopyComments] : dr. Valeri Young in [place] [phone] [HIVDate] : 09/18 [HepatitisCDate] : 09/18 [] : No

## 2020-09-28 NOTE — PHYSICAL EXAM
[No Acute Distress] : no acute distress [Well Nourished] : well nourished [Well Developed] : well developed [Well-Appearing] : well-appearing [PERRL] : pupils equal round and reactive to light [EOMI] : extraocular movements intact [Normal Outer Ear/Nose] : the outer ears and nose were normal in appearance [Normal Oropharynx] : the oropharynx was normal [No JVD] : no jugular venous distention [No Lymphadenopathy] : no lymphadenopathy [Supple] : supple [Thyroid Normal, No Nodules] : the thyroid was normal and there were no nodules present [No Respiratory Distress] : no respiratory distress  [No Accessory Muscle Use] : no accessory muscle use [Clear to Auscultation] : lungs were clear to auscultation bilaterally [Normal Rate] : normal rate  [Regular Rhythm] : with a regular rhythm [Normal S1, S2] : normal S1 and S2 [No Murmur] : no murmur heard [No Carotid Bruits] : no carotid bruits [No Abdominal Bruit] : a ~M bruit was not heard ~T in the abdomen [No Varicosities] : no varicosities [Pedal Pulses Present] : the pedal pulses are present [No Edema] : there was no peripheral edema [No Palpable Aorta] : no palpable aorta [No Extremity Clubbing/Cyanosis] : no extremity clubbing/cyanosis [Soft] : abdomen soft [Non Tender] : non-tender [Non-distended] : non-distended [No Masses] : no abdominal mass palpated [No HSM] : no HSM [Normal Bowel Sounds] : normal bowel sounds [Normal Posterior Cervical Nodes] : no posterior cervical lymphadenopathy [Normal Anterior Cervical Nodes] : no anterior cervical lymphadenopathy [No CVA Tenderness] : no CVA  tenderness [No Spinal Tenderness] : no spinal tenderness [No Joint Swelling] : no joint swelling [Grossly Normal Strength/Tone] : grossly normal strength/tone [No Rash] : no rash [Coordination Grossly Intact] : coordination grossly intact [No Focal Deficits] : no focal deficits [Normal Gait] : normal gait [Normal Affect] : the affect was normal [Normal Insight/Judgement] : insight and judgment were intact [de-identified] : left eye subconjunctival bleed

## 2020-09-28 NOTE — PLAN
[FreeTextEntry1] : f/u ENT FOR EPISTAXIS.\par  take vitamin C and Zinc.\par  monitor BP. \par f/u urology.\par check labs. \par declined vaccines. \par  PPI, Dexilant. \par low salt diet.\par  D3 supplement

## 2020-09-29 LAB
25(OH)D3 SERPL-MCNC: 31 NG/ML
ALBUMIN SERPL ELPH-MCNC: 4.5 G/DL
ALP BLD-CCNC: 88 U/L
ALT SERPL-CCNC: 18 U/L
ANION GAP SERPL CALC-SCNC: 14 MMOL/L
APPEARANCE: CLEAR
AST SERPL-CCNC: 17 U/L
BACTERIA: NEGATIVE
BASOPHILS # BLD AUTO: 0.04 K/UL
BASOPHILS NFR BLD AUTO: 0.5 %
BILIRUB SERPL-MCNC: 1.2 MG/DL
BILIRUBIN URINE: NEGATIVE
BLOOD URINE: NEGATIVE
BUN SERPL-MCNC: 17 MG/DL
CALCIUM SERPL-MCNC: 9.4 MG/DL
CHLORIDE SERPL-SCNC: 107 MMOL/L
CHOLEST SERPL-MCNC: 183 MG/DL
CHOLEST/HDLC SERPL: 2.7 RATIO
CO2 SERPL-SCNC: 23 MMOL/L
COLOR: NORMAL
CREAT SERPL-MCNC: 0.92 MG/DL
EOSINOPHIL # BLD AUTO: 0.14 K/UL
EOSINOPHIL NFR BLD AUTO: 1.7 %
ESTIMATED AVERAGE GLUCOSE: 111 MG/DL
GLUCOSE QUALITATIVE U: NEGATIVE
GLUCOSE SERPL-MCNC: 91 MG/DL
HBA1C MFR BLD HPLC: 5.5 %
HCT VFR BLD CALC: 44.4 %
HDLC SERPL-MCNC: 69 MG/DL
HGB BLD-MCNC: 13.6 G/DL
HYALINE CASTS: 1 /LPF
IMM GRANULOCYTES NFR BLD AUTO: 0.1 %
KETONES URINE: NEGATIVE
LDLC SERPL CALC-MCNC: 96 MG/DL
LEUKOCYTE ESTERASE URINE: NEGATIVE
LYMPHOCYTES # BLD AUTO: 1.91 K/UL
LYMPHOCYTES NFR BLD AUTO: 23.5 %
MAN DIFF?: NORMAL
MCHC RBC-ENTMCNC: 29.4 PG
MCHC RBC-ENTMCNC: 30.6 GM/DL
MCV RBC AUTO: 95.9 FL
MICROSCOPIC-UA: NORMAL
MONOCYTES # BLD AUTO: 0.7 K/UL
MONOCYTES NFR BLD AUTO: 8.6 %
NEUTROPHILS # BLD AUTO: 5.34 K/UL
NEUTROPHILS NFR BLD AUTO: 65.6 %
NITRITE URINE: NEGATIVE
PH URINE: 6
PLATELET # BLD AUTO: 271 K/UL
POTASSIUM SERPL-SCNC: 4.9 MMOL/L
PROT SERPL-MCNC: 6.6 G/DL
PROTEIN URINE: NEGATIVE
PSA FREE FLD-MCNC: 24 %
PSA FREE SERPL-MCNC: 0.29 NG/ML
PSA SERPL-MCNC: 1.2 NG/ML
RBC # BLD: 4.63 M/UL
RBC # FLD: 13.3 %
RED BLOOD CELLS URINE: 0 /HPF
SODIUM SERPL-SCNC: 144 MMOL/L
SPECIFIC GRAVITY URINE: 1.02
SQUAMOUS EPITHELIAL CELLS: 0 /HPF
T3FREE SERPL-MCNC: 2.26 PG/ML
T4 FREE SERPL-MCNC: 1.6 NG/DL
TRIGL SERPL-MCNC: 94 MG/DL
TSH SERPL-ACNC: 0.74 UIU/ML
UROBILINOGEN URINE: NORMAL
WBC # FLD AUTO: 8.14 K/UL
WHITE BLOOD CELLS URINE: 0 /HPF

## 2020-10-06 NOTE — PLAN
[FreeTextEntry1] : ice therapy, antiinflammatory, avoid aggravating activities. \par ortho referral. \par f/u podiatry. \par Neuro f/u if neurological symptoms . 4 or more times a week

## 2020-10-08 ENCOUNTER — APPOINTMENT (OUTPATIENT)
Dept: UROLOGY | Facility: CLINIC | Age: 50
End: 2020-10-08
Payer: MEDICARE

## 2020-10-08 VITALS — DIASTOLIC BLOOD PRESSURE: 80 MMHG | TEMPERATURE: 98.3 F | SYSTOLIC BLOOD PRESSURE: 121 MMHG

## 2020-10-08 PROCEDURE — 99213 OFFICE O/P EST LOW 20 MIN: CPT
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
## 2020-10-08 NOTE — ASSESSMENT
[FreeTextEntry1] : Almost 1.5y s/p L lap partial for papillary RCC. labs normal. has some new flank pain. Suspect MSK in nature but needs eval for recurrence anyway--will reimage. \par --MRI and chest x-ray now

## 2020-10-15 ENCOUNTER — RX RENEWAL (OUTPATIENT)
Age: 50
End: 2020-10-15

## 2020-11-19 ENCOUNTER — RX RENEWAL (OUTPATIENT)
Age: 50
End: 2020-11-19

## 2020-11-20 ENCOUNTER — RX RENEWAL (OUTPATIENT)
Age: 50
End: 2020-11-20

## 2020-12-02 ENCOUNTER — APPOINTMENT (OUTPATIENT)
Dept: MRI IMAGING | Facility: HOSPITAL | Age: 50
End: 2020-12-02
Payer: MEDICARE

## 2020-12-02 ENCOUNTER — RESULT REVIEW (OUTPATIENT)
Age: 50
End: 2020-12-02

## 2020-12-02 ENCOUNTER — APPOINTMENT (OUTPATIENT)
Dept: RADIOLOGY | Facility: HOSPITAL | Age: 50
End: 2020-12-02
Payer: MEDICARE

## 2020-12-02 ENCOUNTER — OUTPATIENT (OUTPATIENT)
Dept: OUTPATIENT SERVICES | Facility: HOSPITAL | Age: 50
LOS: 1 days | End: 2020-12-02
Payer: MEDICARE

## 2020-12-02 DIAGNOSIS — M17.10 UNILATERAL PRIMARY OSTEOARTHRITIS, UNSPECIFIED KNEE: Chronic | ICD-10-CM

## 2020-12-02 DIAGNOSIS — M12.9 ARTHROPATHY, UNSPECIFIED: Chronic | ICD-10-CM

## 2020-12-02 DIAGNOSIS — Z98.1 ARTHRODESIS STATUS: Chronic | ICD-10-CM

## 2020-12-02 DIAGNOSIS — C64.2 MALIGNANT NEOPLASM OF LEFT KIDNEY, EXCEPT RENAL PELVIS: ICD-10-CM

## 2020-12-02 DIAGNOSIS — R06.83 SNORING: Chronic | ICD-10-CM

## 2020-12-02 PROCEDURE — 71046 X-RAY EXAM CHEST 2 VIEWS: CPT | Mod: 26

## 2020-12-02 PROCEDURE — A9579: CPT

## 2020-12-02 PROCEDURE — 71046 X-RAY EXAM CHEST 2 VIEWS: CPT

## 2020-12-02 PROCEDURE — 74183 MRI ABD W/O CNTR FLWD CNTR: CPT | Mod: 26

## 2020-12-02 PROCEDURE — 74183 MRI ABD W/O CNTR FLWD CNTR: CPT

## 2020-12-17 ENCOUNTER — RX RENEWAL (OUTPATIENT)
Age: 50
End: 2020-12-17

## 2021-03-17 ENCOUNTER — APPOINTMENT (OUTPATIENT)
Dept: PAIN MANAGEMENT | Facility: CLINIC | Age: 51
End: 2021-03-17
Payer: MEDICARE

## 2021-03-17 VITALS
HEIGHT: 70 IN | BODY MASS INDEX: 30.35 KG/M2 | DIASTOLIC BLOOD PRESSURE: 82 MMHG | SYSTOLIC BLOOD PRESSURE: 121 MMHG | WEIGHT: 212 LBS | HEART RATE: 60 BPM

## 2021-03-17 VITALS — TEMPERATURE: 97 F

## 2021-03-17 PROCEDURE — 99072 ADDL SUPL MATRL&STAF TM PHE: CPT

## 2021-03-17 PROCEDURE — 99204 OFFICE O/P NEW MOD 45 MIN: CPT

## 2021-03-17 NOTE — PHYSICAL EXAM
[General Appearance - Alert] : alert [Affect] : the affect was normal [Person] : oriented to person [Place] : oriented to place [Sclera] : the sclera and conjunctiva were normal [Outer Ear] : the ears and nose were normal in appearance [Neck Appearance] : the appearance of the neck was normal [Nail Clubbing] : no clubbing  or cyanosis of the fingernails [] : no rash
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
## 2021-03-19 ENCOUNTER — RX RENEWAL (OUTPATIENT)
Age: 51
End: 2021-03-19

## 2021-03-29 ENCOUNTER — OUTPATIENT (OUTPATIENT)
Dept: OUTPATIENT SERVICES | Facility: HOSPITAL | Age: 51
LOS: 1 days | End: 2021-03-29
Payer: MEDICARE

## 2021-03-29 ENCOUNTER — APPOINTMENT (OUTPATIENT)
Dept: RADIOLOGY | Facility: HOSPITAL | Age: 51
End: 2021-03-29
Payer: MEDICARE

## 2021-03-29 DIAGNOSIS — Z00.8 ENCOUNTER FOR OTHER GENERAL EXAMINATION: ICD-10-CM

## 2021-03-29 DIAGNOSIS — M17.10 UNILATERAL PRIMARY OSTEOARTHRITIS, UNSPECIFIED KNEE: Chronic | ICD-10-CM

## 2021-03-29 DIAGNOSIS — M12.9 ARTHROPATHY, UNSPECIFIED: Chronic | ICD-10-CM

## 2021-03-29 DIAGNOSIS — R06.83 SNORING: Chronic | ICD-10-CM

## 2021-03-29 DIAGNOSIS — Z98.1 ARTHRODESIS STATUS: Chronic | ICD-10-CM

## 2021-03-29 PROCEDURE — 73630 X-RAY EXAM OF FOOT: CPT

## 2021-03-29 PROCEDURE — 73630 X-RAY EXAM OF FOOT: CPT | Mod: 26,LT

## 2021-03-31 ENCOUNTER — APPOINTMENT (OUTPATIENT)
Dept: PAIN MANAGEMENT | Facility: CLINIC | Age: 51
End: 2021-03-31
Payer: MEDICARE

## 2021-03-31 PROCEDURE — 99212 OFFICE O/P EST SF 10 MIN: CPT | Mod: 95

## 2021-04-01 ENCOUNTER — APPOINTMENT (OUTPATIENT)
Dept: PAIN MANAGEMENT | Facility: CLINIC | Age: 51
End: 2021-04-01

## 2021-04-05 ENCOUNTER — NON-APPOINTMENT (OUTPATIENT)
Age: 51
End: 2021-04-05

## 2021-04-05 ENCOUNTER — APPOINTMENT (OUTPATIENT)
Dept: FAMILY MEDICINE | Facility: CLINIC | Age: 51
End: 2021-04-05
Payer: MEDICARE

## 2021-04-05 VITALS
HEART RATE: 77 BPM | OXYGEN SATURATION: 98 % | TEMPERATURE: 97.3 F | DIASTOLIC BLOOD PRESSURE: 78 MMHG | WEIGHT: 217 LBS | HEIGHT: 70 IN | SYSTOLIC BLOOD PRESSURE: 130 MMHG | RESPIRATION RATE: 14 BRPM | BODY MASS INDEX: 31.07 KG/M2

## 2021-04-05 PROCEDURE — 99072 ADDL SUPL MATRL&STAF TM PHE: CPT

## 2021-04-05 PROCEDURE — 36415 COLL VENOUS BLD VENIPUNCTURE: CPT

## 2021-04-05 PROCEDURE — 99215 OFFICE O/P EST HI 40 MIN: CPT | Mod: 25

## 2021-04-05 RX ORDER — DEXLANSOPRAZOLE 60 MG/1
60 CAPSULE, DELAYED RELEASE ORAL DAILY
Qty: 30 | Refills: 5 | Status: DISCONTINUED | COMMUNITY
Start: 2020-09-28 | End: 2021-04-05

## 2021-04-05 RX ORDER — QUETIAPINE FUMARATE 25 MG/1
25 TABLET ORAL
Refills: 0 | Status: ACTIVE | COMMUNITY

## 2021-04-06 LAB
25(OH)D3 SERPL-MCNC: 41.9 NG/ML
ALBUMIN SERPL ELPH-MCNC: 4.4 G/DL
ALP BLD-CCNC: 95 U/L
ALT SERPL-CCNC: 31 U/L
ANION GAP SERPL CALC-SCNC: 14 MMOL/L
AST SERPL-CCNC: 16 U/L
BASOPHILS # BLD AUTO: 0.05 K/UL
BASOPHILS NFR BLD AUTO: 0.7 %
BILIRUB SERPL-MCNC: 1 MG/DL
BUN SERPL-MCNC: 13 MG/DL
CALCIUM SERPL-MCNC: 9.2 MG/DL
CHLORIDE SERPL-SCNC: 108 MMOL/L
CHOLEST SERPL-MCNC: 189 MG/DL
CO2 SERPL-SCNC: 20 MMOL/L
CREAT SERPL-MCNC: 0.84 MG/DL
EOSINOPHIL # BLD AUTO: 0.17 K/UL
EOSINOPHIL NFR BLD AUTO: 2.5 %
ESTIMATED AVERAGE GLUCOSE: 108 MG/DL
GLUCOSE SERPL-MCNC: 103 MG/DL
HBA1C MFR BLD HPLC: 5.4 %
HCT VFR BLD CALC: 42.4 %
HDLC SERPL-MCNC: 60 MG/DL
HGB BLD-MCNC: 13.8 G/DL
IMM GRANULOCYTES NFR BLD AUTO: 0.3 %
LDLC SERPL CALC-MCNC: 112 MG/DL
LYMPHOCYTES # BLD AUTO: 1.64 K/UL
LYMPHOCYTES NFR BLD AUTO: 24.3 %
MAN DIFF?: NORMAL
MCHC RBC-ENTMCNC: 29.6 PG
MCHC RBC-ENTMCNC: 32.5 GM/DL
MCV RBC AUTO: 90.8 FL
MONOCYTES # BLD AUTO: 0.63 K/UL
MONOCYTES NFR BLD AUTO: 9.3 %
NEUTROPHILS # BLD AUTO: 4.25 K/UL
NEUTROPHILS NFR BLD AUTO: 62.9 %
NONHDLC SERPL-MCNC: 129 MG/DL
PLATELET # BLD AUTO: 249 K/UL
POTASSIUM SERPL-SCNC: 4.4 MMOL/L
PROT SERPL-MCNC: 6.9 G/DL
RBC # BLD: 4.67 M/UL
RBC # FLD: 12.8 %
SODIUM SERPL-SCNC: 142 MMOL/L
T3FREE SERPL-MCNC: 3.11 PG/ML
T4 FREE SERPL-MCNC: 1.7 NG/DL
TRIGL SERPL-MCNC: 88 MG/DL
TSH SERPL-ACNC: 1.23 UIU/ML
WBC # FLD AUTO: 6.76 K/UL

## 2021-04-10 NOTE — PLAN
[FreeTextEntry1] : anemia: check labs.\par HTN: loW Sodium diet.Metoprolol.\par hypothyroid: Synthroid .\par asthma: inhaler.\par Obesity: weight loss.\par D 3 supplement.\par

## 2021-04-10 NOTE — PHYSICAL EXAM
[Well Nourished] : well nourished [Well Developed] : well developed [Well-Appearing] : well-appearing [PERRL] : pupils equal round and reactive to light [EOMI] : extraocular movements intact [Normal Outer Ear/Nose] : the outer ears and nose were normal in appearance [Normal Oropharynx] : the oropharynx was normal [No JVD] : no jugular venous distention [No Lymphadenopathy] : no lymphadenopathy [Supple] : supple [Thyroid Normal, No Nodules] : the thyroid was normal and there were no nodules present [No Respiratory Distress] : no respiratory distress  [Clear to Auscultation] : lungs were clear to auscultation bilaterally [No Accessory Muscle Use] : no accessory muscle use [Normal Rate] : normal rate  [Regular Rhythm] : with a regular rhythm [Normal S1, S2] : normal S1 and S2 [No Murmur] : no murmur heard [No Carotid Bruits] : no carotid bruits [No Abdominal Bruit] : a ~M bruit was not heard ~T in the abdomen [No Varicosities] : no varicosities [Pedal Pulses Present] : the pedal pulses are present [No Edema] : there was no peripheral edema [No Palpable Aorta] : no palpable aorta [No Extremity Clubbing/Cyanosis] : no extremity clubbing/cyanosis [Soft] : abdomen soft [Non Tender] : non-tender [Non-distended] : non-distended [No Masses] : no abdominal mass palpated [No HSM] : no HSM [Normal Bowel Sounds] : normal bowel sounds [Normal Posterior Cervical Nodes] : no posterior cervical lymphadenopathy [Normal Anterior Cervical Nodes] : no anterior cervical lymphadenopathy [No CVA Tenderness] : no CVA  tenderness [No Spinal Tenderness] : no spinal tenderness [No Joint Swelling] : no joint swelling [Grossly Normal Strength/Tone] : grossly normal strength/tone [No Rash] : no rash [Coordination Grossly Intact] : coordination grossly intact [No Focal Deficits] : no focal deficits [Normal Gait] : normal gait [Normal Affect] : the affect was normal [Normal Insight/Judgement] : insight and judgment were intact

## 2021-04-10 NOTE — HISTORY OF PRESENT ILLNESS
[FreeTextEntry8] : Patient is here for check up Obesity, Hypothyroid. HTN, asthma, anemia. He is here for medicine refills.\par  He os AAOx3,NAD. no chest pain, SOB, fever, chills, cough. \par He is eating healthy diet. He has chronic headaches and felt worse with Metoprolol and Topamax. He saw neurologist.

## 2021-04-12 RX ORDER — FLUTICASONE FUROATE AND VILANTEROL TRIFENATATE 200; 25 UG/1; UG/1
200-25 POWDER RESPIRATORY (INHALATION)
Qty: 1 | Refills: 3 | Status: DISCONTINUED | COMMUNITY
Start: 2020-06-09 | End: 2021-04-12

## 2021-04-27 ENCOUNTER — APPOINTMENT (OUTPATIENT)
Dept: PAIN MANAGEMENT | Facility: CLINIC | Age: 51
End: 2021-04-27
Payer: MEDICARE

## 2021-04-27 VITALS
BODY MASS INDEX: 30.06 KG/M2 | HEIGHT: 70 IN | WEIGHT: 210 LBS | SYSTOLIC BLOOD PRESSURE: 135 MMHG | HEART RATE: 63 BPM | DIASTOLIC BLOOD PRESSURE: 88 MMHG

## 2021-04-27 VITALS — TEMPERATURE: 97.8 F

## 2021-04-27 PROCEDURE — 99214 OFFICE O/P EST MOD 30 MIN: CPT

## 2021-04-27 PROCEDURE — 99072 ADDL SUPL MATRL&STAF TM PHE: CPT

## 2021-04-27 NOTE — HISTORY OF PRESENT ILLNESS
[FreeTextEntry1] : Patient reports no relief/AEs from topamax and Inderal. At this time, on no meds. \par

## 2021-04-27 NOTE — ASSESSMENT
[FreeTextEntry1] : Chronic migraine\par Non focal exam\par Failed topamax/Inderal\par Will proceed with BOTOX. Advised of AEs.

## 2021-05-06 NOTE — REASON FOR VISIT
[Home] : at home, [unfilled] , at the time of the visit. [Medical Office: ([facility])___] : at the medical office located in  [Verbal consent obtained from patient] : the patient, [unfilled] [Follow-Up: _____] : a [unfilled] follow-up visit

## 2021-05-06 NOTE — ASSESSMENT
[FreeTextEntry1] : I will start paperwork for medical marijuana .\par Dr Zacarias will review and sign.

## 2021-05-06 NOTE — HISTORY OF PRESENT ILLNESS
[FreeTextEntry1] : Pt explains as per Dr Zacarias patient is o be registered for medical marijuana for chronic pain - headaches and migraine. She is has constant pain everyday .8/10\par Pt denies any cardiac issues or history of hallucinations. \par As per Dr Zacarias 's notes CBD/THC

## 2021-05-06 NOTE — PHYSICAL EXAM
[General Appearance - Alert] : alert [] : normal voice and communication [Oriented To Time, Place, And Person] : oriented to person, place, and time [Affect] : the affect was normal [Mood] : the mood was normal

## 2021-05-27 NOTE — H&P PST ADULT - NSICDXPROBLEM_GEN_ALL_CORE_FT
Please call the patient personally and let them know that the results are normal.  If the patient wishes to speak to me, I will call them back within 48 hours.  Follow-up as planned.    Lab results:     Labs overall look good, keep regular scheduled follow up  CHOLESTEROL-    Tell the patient their total chol number, LDL, and HDL.       Chol is high, just try to eat healthier, less fatty food, less red meats. More veggies.       Triglycerides are high. Avoid sweets and fatty food. OTC fish oil may help, as does weight loss. We will recheck on follow up       Blood counts and kidney/liver counts look good         PROBLEM DIAGNOSES  Problem: Kidney mass  Assessment and Plan: This is a 49 y/o male who is scheduled for left lap partial nephrectomy, possible radical possible open on 5-15-19  * Given preop instructions and scrub cleanser  * Instructed to take normal am dose of   the am of surgery    Problem: Snoring  Assessment and Plan: Notified OR booking via fax of EZ precautions PROBLEM DIAGNOSES  Problem: Kidney mass  Assessment and Plan: This is a 47 y/o male who is scheduled for left lap partial nephrectomy, possible radical possible open on 5-15-19  * Given preop instructions and scrub cleanser  * Instructed to take normal am dose of   the am of surgery    Problem: Snoring  Assessment and Plan: Notified OR booking via fax of ZE precautions     Problem: Wheezing  Assessment and Plan: Await medical evaluation with pcp (requested by surgeon) due to recent episodes of wheezing and patient stopped inhalers on his own PROBLEM DIAGNOSES  Problem: Kidney mass  Assessment and Plan: This is a 49 y/o male who is scheduled for left lap partial nephrectomy, possible radical possible open on 5-15-19  * Given preop instructions and scrub cleanser  * Instructed to take normal am dose of levothyroxine and metoprolol the am of surgery    Problem: Snoring  Assessment and Plan: Notified OR booking via fax of ZE precautions     Problem: Wheezing  Assessment and Plan: Await medical evaluation with pcp (requested by surgeon) due to recent episodes of wheezing and patient stopped inhalers on his own

## 2021-06-08 ENCOUNTER — APPOINTMENT (OUTPATIENT)
Dept: PAIN MANAGEMENT | Facility: CLINIC | Age: 51
End: 2021-06-08
Payer: MEDICARE

## 2021-06-08 VITALS
HEIGHT: 70 IN | WEIGHT: 207 LBS | SYSTOLIC BLOOD PRESSURE: 110 MMHG | DIASTOLIC BLOOD PRESSURE: 70 MMHG | HEART RATE: 64 BPM | BODY MASS INDEX: 29.63 KG/M2

## 2021-06-08 PROCEDURE — 99072 ADDL SUPL MATRL&STAF TM PHE: CPT

## 2021-06-08 PROCEDURE — 64615 CHEMODENERV MUSC MIGRAINE: CPT

## 2021-06-08 PROCEDURE — 99213 OFFICE O/P EST LOW 20 MIN: CPT | Mod: 25

## 2021-06-08 NOTE — ASSESSMENT
[FreeTextEntry1] : Is a case of a 50-year-old gentleman who presents for chronic migraines unresponsive to oral preventative and abortive agents for migraine.  His exam essentially nonfocal.\par \par Will start treating with Botox 155 units as described above.\par \par Follow-up in 3 months.

## 2021-06-08 NOTE — PHYSICAL EXAM
[FreeTextEntry1] : Constitutional: No signs of distress. No signs of toxicity. \par MS: Alert and well oriented. Speech fluent. No aphasia. Fund of knowledge intact. \par Psychiatric: Mood stable.\par Motor: Adequate bulk, tone, strength. 5/5 strength\par DTR: present and symmetrical; no clonus\par Sensory: intact to primary and secondary modalities; neg Romberg\par Cerebellar and gait: intact\par Eyes: no redness or swelling\par HEENT: intact\par Neck: No masses noted\par Pulmonary: no respiratory distress\par Vascular: no temperature,color changes; no edema\par Musculoskeletal: examination of the cervical spine reveals no midline tenderness, range of motion full upon flexion, extension and lateral rotation. \par Skin: No rash.\par

## 2021-06-08 NOTE — HISTORY OF PRESENT ILLNESS
[FreeTextEntry1] : This is a case of a 50-year-old gentleman who is here for follow-up visit.  He continues to complain of chronic daily headaches of migrainous nature unresponsive to oral preventative and abortive therapies.  He denies any new medical problems.

## 2021-06-28 ENCOUNTER — RX RENEWAL (OUTPATIENT)
Age: 51
End: 2021-06-28

## 2021-07-27 ENCOUNTER — APPOINTMENT (OUTPATIENT)
Dept: FAMILY MEDICINE | Facility: CLINIC | Age: 51
End: 2021-07-27
Payer: MEDICARE

## 2021-07-27 VITALS
BODY MASS INDEX: 29.35 KG/M2 | RESPIRATION RATE: 16 BRPM | HEIGHT: 70 IN | OXYGEN SATURATION: 97 % | DIASTOLIC BLOOD PRESSURE: 76 MMHG | SYSTOLIC BLOOD PRESSURE: 120 MMHG | TEMPERATURE: 97.2 F | WEIGHT: 205 LBS | HEART RATE: 60 BPM

## 2021-07-27 DIAGNOSIS — E66.3 OVERWEIGHT: ICD-10-CM

## 2021-07-27 PROCEDURE — 99072 ADDL SUPL MATRL&STAF TM PHE: CPT

## 2021-07-27 PROCEDURE — 99215 OFFICE O/P EST HI 40 MIN: CPT

## 2021-07-27 RX ORDER — FLUTICASONE PROPIONATE AND SALMETEROL 250; 50 UG/1; UG/1
250-50 POWDER RESPIRATORY (INHALATION)
Qty: 1 | Refills: 0 | Status: DISCONTINUED | COMMUNITY
Start: 2020-06-16 | End: 2021-07-27

## 2021-07-27 RX ORDER — ALBUTEROL SULFATE 90 UG/1
108 (90 BASE) AEROSOL, METERED RESPIRATORY (INHALATION)
Qty: 1 | Refills: 3 | Status: DISCONTINUED | COMMUNITY
Start: 2020-09-28 | End: 2021-07-27

## 2021-07-28 NOTE — HEALTH RISK ASSESSMENT
[With Patient/Caregiver] : , with patient/caregiver [] : No [No falls in past year] : Patient reported no falls in the past year [0] : 2) Feeling down, depressed, or hopeless: Not at all (0) [PHQ-2 Negative - No further assessment needed] : PHQ-2 Negative - No further assessment needed [de-identified] : works out regularly  [de-identified] : intermittent fasting  [[id number]] : 0 [AdvancecareDate] : 07/2021

## 2021-07-28 NOTE — HISTORY OF PRESENT ILLNESS
[FreeTextEntry1] : Hypothyroid, HTN, asthma [de-identified] : Patient is here for check up Obesity, Hypothyroid. HTN, asthma, anemia. He is here for medicine refills.\par He os AAOx3,NAD. no chest pain, SOB, fever, chills, cough. \par He is eating healthy diet. He has chronic headaches and he saw neurologist. \par c/o fatigue. He has been working out and has lost weight. He is asking to complete form he is required to fill out for His disability status update.

## 2021-07-28 NOTE — PLAN
[FreeTextEntry1] : anemia resolved. labs reviewed. will repeat in Fall. \par HTN: loW Sodium diet.Metoprolol.\par hypothyroid: Synthroid .\par asthma: inhaler.\par Obesity: patient had successful weight loss.\par D 3 supplement.\par CA kidney- pt. sees oncology and urology and is stable.\par  Form reviewed with patient and filled out medical information. \par

## 2021-08-02 ENCOUNTER — RX RENEWAL (OUTPATIENT)
Age: 51
End: 2021-08-02

## 2021-08-05 ENCOUNTER — RX RENEWAL (OUTPATIENT)
Age: 51
End: 2021-08-05

## 2021-08-09 ENCOUNTER — RX RENEWAL (OUTPATIENT)
Age: 51
End: 2021-08-09

## 2021-08-26 NOTE — H&P PST ADULT - NOTES
[FreeTextEntry1] : 21 \par - R 4 PIP recurrent swelling with increased pain/ stiffness; Knees stiff, uncomfortable and stiff and painful.. R ankle pain (not new - site of injury) and intermittently R shoulder w/ hx of partial tear of RTC and bicep tear... (again heavy lifting)...\par - psoriasis stable + response to topical tx rarely needed lately\par - IBS C + IBS C daily probiotic + response..and Ibguard..\par - still exercises, energy level excellent, ros- otherwise neg \par Stable..elevated Hb w/ macrocytosis.. seen by Dr. Andres no treatment or additional studies beyond labs - BM not indicated\par \par 1) Psoriasis ? PsA\par 2) IBS-C\par 3) Macrocytosis w/ polycythemia\par 4) Osteoporosis \par ______________________________________________________________________________\par \par (Initial HPI 10/20) \par 70 yo wf  w/ hx of psoriasis with recent increase in joint pain involving  knees, hands, R ankle (old fracture, intermittent swelling),  and most significantly R shoulder mild intermittent ... \par Stiffness in am less than 30 mins \par Hx of plantar fascitis, occas shoulder bursitis.. with RTC partial tear (confirmed MRI w/ diffuse inflammatory changes)  and bicep tear R arm.. 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 seen by Dr. Andres no treatment or additional studies beyond labs - BM not indicated \par + IBS C daily probiotic + response..and Ibguard..\par  \par Occas numbness w/ raynauds changes for many no digital ulcerations.  \par \par Mild HTN \par Age appropriate malignancy screening:  \par colonoscopy/ endoscopy, mammogram, pap 2020  smear, CXR\par Osteoporosis 2019 updated.. severe \par \par +FH mother psoriasis.. .  has 5 children alive and well

## 2021-09-21 ENCOUNTER — NON-APPOINTMENT (OUTPATIENT)
Age: 51
End: 2021-09-21

## 2021-09-21 ENCOUNTER — APPOINTMENT (OUTPATIENT)
Dept: FAMILY MEDICINE | Facility: CLINIC | Age: 51
End: 2021-09-21
Payer: MEDICARE

## 2021-09-21 VITALS
WEIGHT: 208 LBS | BODY MASS INDEX: 30.81 KG/M2 | HEIGHT: 69 IN | TEMPERATURE: 97 F | RESPIRATION RATE: 18 BRPM | OXYGEN SATURATION: 98 % | SYSTOLIC BLOOD PRESSURE: 116 MMHG | HEART RATE: 57 BPM | DIASTOLIC BLOOD PRESSURE: 82 MMHG

## 2021-09-21 DIAGNOSIS — R00.1 BRADYCARDIA, UNSPECIFIED: ICD-10-CM

## 2021-09-21 PROCEDURE — 93000 ELECTROCARDIOGRAM COMPLETE: CPT

## 2021-09-21 PROCEDURE — 99396 PREV VISIT EST AGE 40-64: CPT | Mod: 25

## 2021-09-21 PROCEDURE — 36415 COLL VENOUS BLD VENIPUNCTURE: CPT

## 2021-09-21 PROCEDURE — 99213 OFFICE O/P EST LOW 20 MIN: CPT | Mod: 25

## 2021-09-21 RX ORDER — ALBUTEROL SULFATE 90 UG/1
108 (90 BASE) AEROSOL, METERED RESPIRATORY (INHALATION)
Qty: 1 | Refills: 5 | Status: DISCONTINUED | COMMUNITY
Start: 2021-04-12 | End: 2021-09-21

## 2021-09-21 NOTE — HISTORY OF PRESENT ILLNESS
[FreeTextEntry1] : chest tightness , asthma, HTN, Fatigue CPE [de-identified] : Here for check up for chest tightness recently , clear phlegm with SOB, relieved with rescue  inhaler. Some days he is using inhaler every day. He feels stuffy nose. He feels tired. He is taking medicine to lower blood pressure. he takes Vitamin D  supplement. He has h/o kidney cancer and now sees urologist yearly.

## 2021-09-21 NOTE — COUNSELING
[Behavioral health counseling provided] : Behavioral health counseling provided [Engage in a relaxing activity] : Engage in a relaxing activity [Plan in advance] : Plan in advance [None] : None [Needs reinforcement, provided] : Patient needs reinforcement on understanding of lifestyle changes and steps needed to achieve self management goal; reinforcement was provided

## 2021-09-21 NOTE — PLAN
[FreeTextEntry1] : f.u cardiology. \par  Lower Metoprolol to 25 mg 1.5 tab daily. DASH diet.\par  f/u urology.low chol. diet.\par  quit carbonated drinks and deli meats. \par Asthma: add low dose ICS to short acting Bronchodilator. \par

## 2021-09-21 NOTE — HEALTH RISK ASSESSMENT
[Good] : ~his/her~  mood as  good [No] : In the past 12 months have you used drugs other than those required for medical reasons? No [No falls in past year] : Patient reported no falls in the past year [0] : 2) Feeling down, depressed, or hopeless: Not at all (0) [PHQ-2 Negative - No further assessment needed] : PHQ-2 Negative - No further assessment needed [Patient reported colonoscopy was normal] : Patient reported colonoscopy was normal [HIV test declined] : HIV test declined [Hepatitis C test declined] : Hepatitis C test declined [None] : None [Alone] : lives alone [On disability] : on disability [College] : College [] :  [Sexually Active] : sexually active [Feels Safe at Home] : Feels safe at home [Fully functional (bathing, dressing, toileting, transferring, walking, feeding)] : Fully functional (bathing, dressing, toileting, transferring, walking, feeding) [Fully functional (using the telephone, shopping, preparing meals, housekeeping, doing laundry, using] : Fully functional and needs no help or supervision to perform IADLs (using the telephone, shopping, preparing meals, housekeeping, doing laundry, using transportation, managing medications and managing finances) [Independent] : managing finances [Reports normal functional visual acuity (ie: able to read med bottle)] : Reports normal functional visual acuity [Smoke Detector] : smoke detector [Carbon Monoxide Detector] : carbon monoxide detector [Seat Belt] :  uses seat belt [Sunscreen] : uses sunscreen [With Patient/Caregiver] : , with patient/caregiver [] : No [de-identified] : active  [de-identified] : regular [[id number]] : 0 [EyeExamDate] : MM/20 [Change in mental status noted] : No change in mental status noted [Language] : denies difficulty with language [Behavior] : denies difficulty with behavior [Learning/Retaining New Information] : denies difficulty learning/retaining new information [Handling Complex Tasks] : denies difficulty handling complex tasks [Reasoning] : denies difficulty with reasoning [Spatial Ability and Orientation] : denies difficulty with spatial ability and orientation [Reports changes in vision] : Reports no changes in vision [Reports changes in dental health] : Reports no changes in dental health [ColonoscopyDate] : 01/19 [de-identified] : w [AdvancecareDate] : 09/21

## 2021-09-22 LAB
25(OH)D3 SERPL-MCNC: 61.2 NG/ML
ALBUMIN SERPL ELPH-MCNC: 4.3 G/DL
ALP BLD-CCNC: 87 U/L
ALT SERPL-CCNC: 21 U/L
ANION GAP SERPL CALC-SCNC: 16 MMOL/L
APPEARANCE: CLEAR
AST SERPL-CCNC: 19 U/L
BACTERIA: NEGATIVE
BILIRUB SERPL-MCNC: 1 MG/DL
BILIRUBIN URINE: NEGATIVE
BLOOD URINE: NEGATIVE
BUN SERPL-MCNC: 17 MG/DL
CALCIUM SERPL-MCNC: 9.3 MG/DL
CHLORIDE SERPL-SCNC: 105 MMOL/L
CHOLEST SERPL-MCNC: 167 MG/DL
CO2 SERPL-SCNC: 20 MMOL/L
COLOR: NORMAL
CREAT SERPL-MCNC: 0.89 MG/DL
GLUCOSE QUALITATIVE U: NEGATIVE
GLUCOSE SERPL-MCNC: 93 MG/DL
HDLC SERPL-MCNC: 54 MG/DL
HYALINE CASTS: 0 /LPF
KETONES URINE: NEGATIVE
LDLC SERPL CALC-MCNC: 99 MG/DL
LEUKOCYTE ESTERASE URINE: NEGATIVE
MICROSCOPIC-UA: NORMAL
NITRITE URINE: NEGATIVE
NONHDLC SERPL-MCNC: 113 MG/DL
PH URINE: 6
POTASSIUM SERPL-SCNC: 5 MMOL/L
PROT SERPL-MCNC: 6.7 G/DL
PROTEIN URINE: NEGATIVE
PSA SERPL-MCNC: 1.21 NG/ML
RED BLOOD CELLS URINE: 0 /HPF
SODIUM SERPL-SCNC: 141 MMOL/L
SPECIFIC GRAVITY URINE: 1.01
SQUAMOUS EPITHELIAL CELLS: 0 /HPF
T4 FREE SERPL-MCNC: 1.8 NG/DL
TRIGL SERPL-MCNC: 66 MG/DL
TSH SERPL-ACNC: 1.29 UIU/ML
UROBILINOGEN URINE: NORMAL
WHITE BLOOD CELLS URINE: 0 /HPF

## 2021-10-03 ENCOUNTER — RX RENEWAL (OUTPATIENT)
Age: 51
End: 2021-10-03

## 2021-10-04 ENCOUNTER — APPOINTMENT (OUTPATIENT)
Dept: UROLOGY | Facility: CLINIC | Age: 51
End: 2021-10-04
Payer: MEDICARE

## 2021-10-04 ENCOUNTER — RX RENEWAL (OUTPATIENT)
Age: 51
End: 2021-10-04

## 2021-10-04 PROCEDURE — 99213 OFFICE O/P EST LOW 20 MIN: CPT

## 2021-10-05 ENCOUNTER — OUTPATIENT (OUTPATIENT)
Dept: OUTPATIENT SERVICES | Facility: HOSPITAL | Age: 51
LOS: 1 days | End: 2021-10-05
Payer: MEDICARE

## 2021-10-05 ENCOUNTER — RESULT REVIEW (OUTPATIENT)
Age: 51
End: 2021-10-05

## 2021-10-05 ENCOUNTER — APPOINTMENT (OUTPATIENT)
Dept: RADIOLOGY | Facility: CLINIC | Age: 51
End: 2021-10-05
Payer: MEDICARE

## 2021-10-05 ENCOUNTER — APPOINTMENT (OUTPATIENT)
Dept: FAMILY MEDICINE | Facility: CLINIC | Age: 51
End: 2021-10-05
Payer: MEDICARE

## 2021-10-05 ENCOUNTER — APPOINTMENT (OUTPATIENT)
Dept: RADIOLOGY | Facility: CLINIC | Age: 51
End: 2021-10-05

## 2021-10-05 VITALS
RESPIRATION RATE: 16 BRPM | HEIGHT: 69 IN | WEIGHT: 207 LBS | TEMPERATURE: 97.6 F | BODY MASS INDEX: 30.66 KG/M2 | HEART RATE: 67 BPM | OXYGEN SATURATION: 97 % | SYSTOLIC BLOOD PRESSURE: 126 MMHG | DIASTOLIC BLOOD PRESSURE: 72 MMHG

## 2021-10-05 DIAGNOSIS — M54.12 RADICULOPATHY, CERVICAL REGION: ICD-10-CM

## 2021-10-05 DIAGNOSIS — C64.9 MALIGNANT NEOPLASM OF UNSPECIFIED KIDNEY, EXCEPT RENAL PELVIS: ICD-10-CM

## 2021-10-05 DIAGNOSIS — R06.83 SNORING: Chronic | ICD-10-CM

## 2021-10-05 DIAGNOSIS — M12.9 ARTHROPATHY, UNSPECIFIED: Chronic | ICD-10-CM

## 2021-10-05 DIAGNOSIS — Z98.1 ARTHRODESIS STATUS: Chronic | ICD-10-CM

## 2021-10-05 DIAGNOSIS — M17.10 UNILATERAL PRIMARY OSTEOARTHRITIS, UNSPECIFIED KNEE: Chronic | ICD-10-CM

## 2021-10-05 PROCEDURE — 72050 X-RAY EXAM NECK SPINE 4/5VWS: CPT

## 2021-10-05 PROCEDURE — 71046 X-RAY EXAM CHEST 2 VIEWS: CPT | Mod: 26

## 2021-10-05 PROCEDURE — 99214 OFFICE O/P EST MOD 30 MIN: CPT

## 2021-10-05 PROCEDURE — 71046 X-RAY EXAM CHEST 2 VIEWS: CPT

## 2021-10-05 PROCEDURE — 72050 X-RAY EXAM NECK SPINE 4/5VWS: CPT | Mod: 26
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
## 2021-10-05 NOTE — PHYSICAL EXAM
[No Acute Distress] : no acute distress [Well Nourished] : well nourished [Well Developed] : well developed [Well-Appearing] : well-appearing [Normal Sclera/Conjunctiva] : normal sclera/conjunctiva [PERRL] : pupils equal round and reactive to light [EOMI] : extraocular movements intact [No JVD] : no jugular venous distention [No Respiratory Distress] : no respiratory distress  [No Accessory Muscle Use] : no accessory muscle use [Clear to Auscultation] : lungs were clear to auscultation bilaterally [Normal Rate] : normal rate  [Regular Rhythm] : with a regular rhythm [Normal S1, S2] : normal S1 and S2 [No Murmur] : no murmur heard [No Carotid Bruits] : no carotid bruits [No Abdominal Bruit] : a ~M bruit was not heard ~T in the abdomen [No Varicosities] : no varicosities [Pedal Pulses Present] : the pedal pulses are present [No Edema] : there was no peripheral edema [No Palpable Aorta] : no palpable aorta [Soft] : abdomen soft [No Extremity Clubbing/Cyanosis] : no extremity clubbing/cyanosis [Non Tender] : non-tender [Non-distended] : non-distended [No Masses] : no abdominal mass palpated [No HSM] : no HSM [Normal Bowel Sounds] : normal bowel sounds [Normal Posterior Cervical Nodes] : no posterior cervical lymphadenopathy [Normal Anterior Cervical Nodes] : no anterior cervical lymphadenopathy [No CVA Tenderness] : no CVA  tenderness [No Spinal Tenderness] : no spinal tenderness [No Joint Swelling] : no joint swelling [Grossly Normal Strength/Tone] : grossly normal strength/tone [No Rash] : no rash [Coordination Grossly Intact] : coordination grossly intact [No Focal Deficits] : no focal deficits [Normal Gait] : normal gait [Deep Tendon Reflexes (DTR)] : deep tendon reflexes were 2+ and symmetric [Normal Affect] : the affect was normal [Normal Insight/Judgement] : insight and judgment were intact [de-identified] : difficulty bending neck but able to do ROM tests  , tender in lower cervical area

## 2021-10-05 NOTE — REVIEW OF SYSTEMS
[Negative] : Heme/Lymph [Joint Pain] : no joint pain [Joint Stiffness] : no joint stiffness [Muscle Pain] : no muscle pain [Muscle Weakness] : no muscle weakness [Back Pain] : no back pain [Joint Swelling] : no joint swelling [FreeTextEntry9] : neck pain

## 2021-10-05 NOTE — PLAN
[FreeTextEntry1] : Xray cervical spine.\par  heat/Ice. nsaids. \par  f/u for results. \par Physical therapy.

## 2021-10-06 ENCOUNTER — APPOINTMENT (OUTPATIENT)
Dept: MRI IMAGING | Facility: CLINIC | Age: 51
End: 2021-10-06
Payer: MEDICARE

## 2021-10-06 PROCEDURE — A9585: CPT

## 2021-10-06 PROCEDURE — 74183 MRI ABD W/O CNTR FLWD CNTR: CPT
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
## 2021-10-08 NOTE — ASSESSMENT
[FreeTextEntry1] : Almost 2.5y s/p L lap partial for papillary RCC. labs normal. \par --MRI and chest x-ray now\par --RTC in 1y for continued surveillance

## 2021-10-29 ENCOUNTER — NON-APPOINTMENT (OUTPATIENT)
Age: 51
End: 2021-10-29

## 2021-10-29 ENCOUNTER — TRANSCRIPTION ENCOUNTER (OUTPATIENT)
Age: 51
End: 2021-10-29

## 2021-11-23 ENCOUNTER — NON-APPOINTMENT (OUTPATIENT)
Age: 51
End: 2021-11-23

## 2021-11-23 ENCOUNTER — TRANSCRIPTION ENCOUNTER (OUTPATIENT)
Age: 51
End: 2021-11-23

## 2021-11-24 ENCOUNTER — NON-APPOINTMENT (OUTPATIENT)
Age: 51
End: 2021-11-24

## 2021-11-24 ENCOUNTER — TRANSCRIPTION ENCOUNTER (OUTPATIENT)
Age: 51
End: 2021-11-24

## 2021-12-03 ENCOUNTER — NON-APPOINTMENT (OUTPATIENT)
Age: 51
End: 2021-12-03

## 2021-12-20 ENCOUNTER — APPOINTMENT (OUTPATIENT)
Age: 51
End: 2021-12-20
Payer: MEDICARE

## 2021-12-20 DIAGNOSIS — U07.1 COVID-19: ICD-10-CM

## 2021-12-20 PROCEDURE — 99213 OFFICE O/P EST LOW 20 MIN: CPT | Mod: 95

## 2021-12-22 ENCOUNTER — EMERGENCY (EMERGENCY)
Facility: HOSPITAL | Age: 51
LOS: 1 days | Discharge: HOME | End: 2021-12-22
Attending: EMERGENCY MEDICINE | Admitting: EMERGENCY MEDICINE
Payer: MEDICARE

## 2021-12-22 VITALS
RESPIRATION RATE: 19 BRPM | DIASTOLIC BLOOD PRESSURE: 83 MMHG | OXYGEN SATURATION: 95 % | SYSTOLIC BLOOD PRESSURE: 147 MMHG | TEMPERATURE: 99 F | HEART RATE: 95 BPM

## 2021-12-22 VITALS
HEART RATE: 99 BPM | OXYGEN SATURATION: 96 % | RESPIRATION RATE: 19 BRPM | HEIGHT: 70 IN | DIASTOLIC BLOOD PRESSURE: 84 MMHG | WEIGHT: 203.05 LBS | TEMPERATURE: 102 F | SYSTOLIC BLOOD PRESSURE: 150 MMHG

## 2021-12-22 DIAGNOSIS — M12.9 ARTHROPATHY, UNSPECIFIED: Chronic | ICD-10-CM

## 2021-12-22 DIAGNOSIS — Z98.1 ARTHRODESIS STATUS: Chronic | ICD-10-CM

## 2021-12-22 DIAGNOSIS — R06.83 SNORING: Chronic | ICD-10-CM

## 2021-12-22 DIAGNOSIS — M17.10 UNILATERAL PRIMARY OSTEOARTHRITIS, UNSPECIFIED KNEE: Chronic | ICD-10-CM

## 2021-12-22 LAB
ALBUMIN SERPL ELPH-MCNC: 3.4 G/DL — SIGNIFICANT CHANGE UP (ref 3.3–5)
ALP SERPL-CCNC: 74 U/L — SIGNIFICANT CHANGE UP (ref 30–120)
ALT FLD-CCNC: 27 U/L DA — SIGNIFICANT CHANGE UP (ref 10–60)
ANION GAP SERPL CALC-SCNC: 9 MMOL/L — SIGNIFICANT CHANGE UP (ref 5–17)
AST SERPL-CCNC: 13 U/L — SIGNIFICANT CHANGE UP (ref 10–40)
BASOPHILS # BLD AUTO: 0.04 K/UL — SIGNIFICANT CHANGE UP (ref 0–0.2)
BASOPHILS NFR BLD AUTO: 0.5 % — SIGNIFICANT CHANGE UP (ref 0–2)
BILIRUB SERPL-MCNC: 1 MG/DL — SIGNIFICANT CHANGE UP (ref 0.2–1.2)
BUN SERPL-MCNC: 10 MG/DL — SIGNIFICANT CHANGE UP (ref 7–23)
CALCIUM SERPL-MCNC: 8.3 MG/DL — LOW (ref 8.4–10.5)
CHLORIDE SERPL-SCNC: 104 MMOL/L — SIGNIFICANT CHANGE UP (ref 96–108)
CO2 SERPL-SCNC: 25 MMOL/L — SIGNIFICANT CHANGE UP (ref 22–31)
CREAT SERPL-MCNC: 0.85 MG/DL — SIGNIFICANT CHANGE UP (ref 0.5–1.3)
EOSINOPHIL # BLD AUTO: 0.09 K/UL — SIGNIFICANT CHANGE UP (ref 0–0.5)
EOSINOPHIL NFR BLD AUTO: 1.2 % — SIGNIFICANT CHANGE UP (ref 0–6)
GLUCOSE SERPL-MCNC: 100 MG/DL — HIGH (ref 70–99)
HCT VFR BLD CALC: 40.4 % — SIGNIFICANT CHANGE UP (ref 39–50)
HGB BLD-MCNC: 13.3 G/DL — SIGNIFICANT CHANGE UP (ref 13–17)
IMM GRANULOCYTES NFR BLD AUTO: 0.1 % — SIGNIFICANT CHANGE UP (ref 0–1.5)
LACTATE SERPL-SCNC: 1 MMOL/L — SIGNIFICANT CHANGE UP (ref 0.7–2)
LYMPHOCYTES # BLD AUTO: 0.8 K/UL — LOW (ref 1–3.3)
LYMPHOCYTES # BLD AUTO: 10.6 % — LOW (ref 13–44)
MCHC RBC-ENTMCNC: 29.1 PG — SIGNIFICANT CHANGE UP (ref 27–34)
MCHC RBC-ENTMCNC: 32.9 GM/DL — SIGNIFICANT CHANGE UP (ref 32–36)
MCV RBC AUTO: 88.4 FL — SIGNIFICANT CHANGE UP (ref 80–100)
MONOCYTES # BLD AUTO: 1.08 K/UL — HIGH (ref 0–0.9)
MONOCYTES NFR BLD AUTO: 14.2 % — HIGH (ref 2–14)
NEUTROPHILS # BLD AUTO: 5.56 K/UL — SIGNIFICANT CHANGE UP (ref 1.8–7.4)
NEUTROPHILS NFR BLD AUTO: 73.4 % — SIGNIFICANT CHANGE UP (ref 43–77)
NRBC # BLD: 0 /100 WBCS — SIGNIFICANT CHANGE UP (ref 0–0)
PLATELET # BLD AUTO: 198 K/UL — SIGNIFICANT CHANGE UP (ref 150–400)
POTASSIUM SERPL-MCNC: 3.9 MMOL/L — SIGNIFICANT CHANGE UP (ref 3.5–5.3)
POTASSIUM SERPL-SCNC: 3.9 MMOL/L — SIGNIFICANT CHANGE UP (ref 3.5–5.3)
PROT SERPL-MCNC: 6.7 G/DL — SIGNIFICANT CHANGE UP (ref 6–8.3)
RAPID RVP RESULT: DETECTED
RBC # BLD: 4.57 M/UL — SIGNIFICANT CHANGE UP (ref 4.2–5.8)
RBC # FLD: 12.7 % — SIGNIFICANT CHANGE UP (ref 10.3–14.5)
SARS-COV-2 RNA SPEC QL NAA+PROBE: DETECTED
SODIUM SERPL-SCNC: 138 MMOL/L — SIGNIFICANT CHANGE UP (ref 135–145)
WBC # BLD: 7.58 K/UL — SIGNIFICANT CHANGE UP (ref 3.8–10.5)
WBC # FLD AUTO: 7.58 K/UL — SIGNIFICANT CHANGE UP (ref 3.8–10.5)

## 2021-12-22 PROCEDURE — 80053 COMPREHEN METABOLIC PANEL: CPT

## 2021-12-22 PROCEDURE — 83605 ASSAY OF LACTIC ACID: CPT

## 2021-12-22 PROCEDURE — 71045 X-RAY EXAM CHEST 1 VIEW: CPT | Mod: 26

## 2021-12-22 PROCEDURE — 85025 COMPLETE CBC W/AUTO DIFF WBC: CPT

## 2021-12-22 PROCEDURE — 36415 COLL VENOUS BLD VENIPUNCTURE: CPT

## 2021-12-22 PROCEDURE — 99283 EMERGENCY DEPT VISIT LOW MDM: CPT | Mod: 25

## 2021-12-22 PROCEDURE — 71045 X-RAY EXAM CHEST 1 VIEW: CPT

## 2021-12-22 PROCEDURE — 99284 EMERGENCY DEPT VISIT MOD MDM: CPT

## 2021-12-22 PROCEDURE — 87040 BLOOD CULTURE FOR BACTERIA: CPT

## 2021-12-22 PROCEDURE — 0225U NFCT DS DNA&RNA 21 SARSCOV2: CPT

## 2021-12-22 PROCEDURE — 96360 HYDRATION IV INFUSION INIT: CPT

## 2021-12-22 RX ORDER — ALPRAZOLAM 0.25 MG
0 TABLET ORAL
Qty: 0 | Refills: 0 | DISCHARGE

## 2021-12-22 RX ORDER — SODIUM CHLORIDE 9 MG/ML
1000 INJECTION INTRAMUSCULAR; INTRAVENOUS; SUBCUTANEOUS ONCE
Refills: 0 | Status: COMPLETED | OUTPATIENT
Start: 2021-12-22 | End: 2021-12-22

## 2021-12-22 RX ORDER — METOPROLOL TARTRATE 50 MG
1 TABLET ORAL
Qty: 0 | Refills: 0 | DISCHARGE

## 2021-12-22 RX ORDER — ACETAMINOPHEN 500 MG
650 TABLET ORAL ONCE
Refills: 0 | Status: COMPLETED | OUTPATIENT
Start: 2021-12-22 | End: 2021-12-22

## 2021-12-22 RX ORDER — LEVOTHYROXINE SODIUM 125 MCG
1 TABLET ORAL
Qty: 0 | Refills: 0 | DISCHARGE

## 2021-12-22 RX ADMIN — SODIUM CHLORIDE 1000 MILLILITER(S): 9 INJECTION INTRAMUSCULAR; INTRAVENOUS; SUBCUTANEOUS at 12:00

## 2021-12-22 RX ADMIN — Medication 650 MILLIGRAM(S): at 11:45

## 2021-12-22 RX ADMIN — SODIUM CHLORIDE 1000 MILLILITER(S): 9 INJECTION INTRAMUSCULAR; INTRAVENOUS; SUBCUTANEOUS at 11:00

## 2021-12-22 RX ADMIN — Medication 650 MILLIGRAM(S): at 11:15

## 2021-12-22 NOTE — ED PROVIDER NOTE - PATIENT PORTAL LINK FT
You can access the FollowMyHealth Patient Portal offered by [facility] by registering at the following website: [email or web address]. By joining [employer]’s FollowMyHealth portal, you will also be able to view your health information using other applications (apps) compatible with our system.

## 2021-12-22 NOTE — ED PROVIDER NOTE - NSICDXPASTSURGICALHX_GEN_ALL_CORE_FT
PAST SURGICAL HISTORY:  Arthropathy right shoulder surgery    Knee arthropathy left ACL in 2010    S/P Injection of Intervertebral Space for Herniated Disc 2004    S/P lumbar fusion double fusion 2010    Snoring UPPP in approximately 2016

## 2021-12-22 NOTE — ED PROVIDER NOTE - ATTENDING CONTRIBUTION TO CARE
pt c/o sob, cough, fevers, chills, body aches, sore throat since 12/19. no cp, abd pain, d/n/v, diarrhea, urinary complaints, rash. pt had neg rapid covid, pcr pending.  wd, wn male, nad, perrl, no photohpobia, mmm, no meningismus, s1s2s rrr, lungs cta, abnd soft, nt, nd no cvat, skin warm dry no rash, neuro no motor or sensory deficits

## 2021-12-22 NOTE — ED PROVIDER NOTE - CLINICAL SUMMARY MEDICAL DECISION MAKING FREE TEXT BOX
51 M hx hypothyroid, HTN, staph infection, renal CA (s/p L mass removal 2019) c/o fever (101.8-102F), chills, HA, sore throat, cough, myalgias, SOB x 4 days. Moderna x 2. - non-toxic, RRR, lungs clear - IVF, tylenol, labs, cxr, rvp

## 2021-12-22 NOTE — ED ADULT NURSE NOTE - NSIMPLEMENTINTERV_GEN_ALL_ED
Implemented All Universal Safety Interventions:  War to call system. Call bell, personal items and telephone within reach. Instruct patient to call for assistance. Room bathroom lighting operational. Non-slip footwear when patient is off stretcher. Physically safe environment: no spills, clutter or unnecessary equipment. Stretcher in lowest position, wheels locked, appropriate side rails in place.

## 2021-12-22 NOTE — ED PROVIDER NOTE - NSICDXPASTMEDICALHX_GEN_ALL_CORE_FT
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

## 2021-12-22 NOTE — ED PROVIDER NOTE - NSFOLLOWUPINSTRUCTIONS_ED_ALL_ED_FT
COVID-19 (Coronavirus Disease 2019)    WHAT YOU NEED TO KNOW:    COVID-19 is the disease caused by a coronavirus first discovered in December 2019. Coronaviruses generally cause upper respiratory (nose, throat, and lung) infections, such as a cold. The 2019 virus spreads quickly and easily. It can be spread starting 2 to 3 days before symptoms even begin.    DISCHARGE INSTRUCTIONS:    Call your local emergency number (911 in the ) if:   •You have trouble breathing or shortness of breath at rest.      •You have chest pain or pressure that lasts longer than 5 minutes.      •You become confused or hard to wake.      •Your lips or face are blue.      Return to the emergency department if:   •You have a fever of 104°F (40°C) or higher.          Call your doctor if:   •You have symptoms of COVID-19.      •You have questions or concerns about your condition or care.      Medicines: You may need any of the following:   •Decongestants help reduce nasal congestion and help you breathe more easily. If you take decongestant pills, they may make you feel restless or cause problems with your sleep. Do not use decongestant sprays for more than a few days.      •Cough suppressants help reduce coughing. Ask your healthcare provider which type of cough medicine is best for you.      •Acetaminophen decreases pain and fever. It is available without a doctor's order. Ask how much to take and how often to take it. Follow directions. Read the labels of all other medicines you are using to see if they also contain acetaminophen, or ask your doctor or pharmacist. Acetaminophen can cause liver damage if not taken correctly. Do not use more than 4 grams (4,000 milligrams) total of acetaminophen in one day.       •NSAIDs, such as ibuprofen, help decrease swelling, pain, and fever. This medicine is available with or without a doctor's order. NSAIDs can cause stomach bleeding or kidney problems in certain people. If you take blood thinner medicine, always ask your healthcare provider if NSAIDs are safe for you. Always read the medicine label and follow directions.      •Blood thinners help prevent blood clots. Clots can cause strokes, heart attacks, and death. The following are general safety guidelines to follow while you are taking a blood thinner:?Watch for bleeding and bruising while you take blood thinners. Watch for bleeding from your gums or nose. Watch for blood in your urine and bowel movements. Use a soft washcloth on your skin, and a soft toothbrush to brush your teeth. This can keep your skin and gums from bleeding. If you shave, use an electric shaver. Do not play contact sports.       ?Tell your dentist and other healthcare providers that you take a blood thinner. Wear a bracelet or necklace that says you take this medicine.       ?Do not start or stop any other medicines unless your healthcare provider tells you to. Many medicines cannot be used with blood thinners.      ?Take your blood thinner exactly as prescribed by your healthcare provider. Do not skip does or take less than prescribed. Tell your provider right away if you forget to take your blood thinner, or if you take too much.      ?Warfarin is a blood thinner that you may need to take. The following are things you should be aware of if you take warfarin: ?Foods and medicines can affect the amount of warfarin in your blood. Do not make major changes to your diet while you take warfarin. Warfarin works best when you eat about the same amount of vitamin K every day. Vitamin K is found in green leafy vegetables and certain other foods. Ask for more information about what to eat when you are taking warfarin.      ?You will need to see your healthcare provider for follow-up visits when you are on warfarin. You will need regular blood tests. These tests are used to decide how much medicine you need.         •Take your medicine as directed. Contact your healthcare provider if you think your medicine is not helping or if you have side effects. Tell him or her if you are allergic to any medicine. Keep a list of the medicines, vitamins, and herbs you take. Include the amounts, and when and why you take them. Bring the list or the pill bottles to follow-up visits. Carry your medicine list with you in case of an emergency.      What you need to know about variants: The virus has changed into several new forms (called variants) since it was discovered. The variants may be more contagious (easily spread) than the original form. Some may also cause more severe illness than others.    What you need to know about COVID-19 vaccines: Healthcare providers recommend a COVID-19 vaccine, even if you have already had COVID-19. You are considered fully vaccinated against COVID-19 two weeks after the final dose of any COVID-19 vaccine. Let your healthcare provider know when you have received the final dose of the vaccine. Make a copy of your vaccination card. Keep the original with you in case you need to show it. Keep the copy in a safe place.  •COVID-19 vaccines are given as a shot in 1 or 2 doses. The vaccine is recommended for everyone 12 years or older.      •A third dose is recommended for adults with a weakened immune system who get a 2-dose vaccine. The third dose is given at least 28 days after the second.      •A booster (additional) dose is being recommended for other people as well. This is usually given 6 months after the initial doses are complete. Continue social distancing and other measures, even after you get the vaccine. Although it is not common, you can become infected after you get the vaccine. You may also be able to pass the virus to others without knowing you are infected.      •After you get the vaccine, check local, national, and international travel rules. You may need to be tested before you travel. Some countries require proof of a negative test before you travel. You may also need to quarantine after you return.      How the 2019 coronavirus spreads:   •Droplets are the main way all coronaviruses spread. The virus travels in droplets that form when a person talks, sings, coughs, or sneezes. The droplets can also float in the air for minutes or hours. Infection happens when you breathe in the droplets or get them in your eyes or nose. Close personal contact with an infected person increases your risk for infection. This means being within 6 feet (2 meters) of the person for at least 15 minutes over 24 hours.      •Person-to-person contact can spread the virus. For example, a person with the virus on his or her hands can spread it by shaking hands with someone.      •The virus can stay on objects and surfaces for up to 3 days. You may become infected by touching the object or surface and then touching your eyes or mouth.      Help lower the risk for COVID-19:   •Wash your hands often throughout the day. Use soap and water. Rub your soapy hands together, lacing your fingers, for at least 20 seconds. Rinse with warm, running water. Dry your hands with a clean towel or paper towel. Use hand  that contains alcohol if soap and water are not available. Teach children how to wash their hands and use hand .  Handwashing           •Cover sneezes and coughs. Turn your face away and cover your mouth and nose with a tissue. Throw the tissue away. Use the bend of your arm if a tissue is not available. Then wash your hands well with soap and water or use hand . Teach children how to cover a cough or sneeze.      •Wear a face covering (mask) when needed. Use a cloth covering with at least 2 layers. You can also create layers by putting a cloth covering over a disposable non-medical mask. Cover your mouth and your nose.  How to Wear a Face Covering (Mask)           •Follow worldwide, national, and local social distancing guidelines. Keep at least 6 feet (2 meters) between you and others.      •Try not to touch your face. If you get the virus on your hands, you can transfer it to your eyes, nose, or mouth and become infected. You can also transfer it to objects, surfaces, or people.      •Clean and disinfect high-touch surfaces and objects often. Use disinfecting wipes, or make a solution of 4 teaspoons of bleach in 1 quart (4 cups) of water.      •Ask about other vaccines you may need. Get the influenza (flu) vaccine as soon as recommended each year, usually starting in September or October. Get the pneumonia vaccine if recommended. Your healthcare provider can tell you if you should also get other vaccines, and when to get them.    Prevent COVID-19 Infection         Follow social distancing guidelines: National and local social distancing rules vary. Rules and restrictions may change over time as restrictions are lifted. The following are general guidelines:   •Stay home if you are sick or think you may have COVID-19. It is important to stay home if you are waiting for a testing appointment or for test results.      •Avoid close physical contact with anyone who does not live in your home. Do not shake hands with, hug, or kiss a person as a greeting. If you must use public transportation (such as a bus or subway), try to sit or stand away from others. Wear your face covering.      •Avoid in-person gatherings and crowds. Attend virtually if possible.      For more information:   •Centers for Disease Control and Prevention  [address] [place] [address]  Phone: 1-663.485.7413  Web Address: http://www.cdc.gov        Follow up with your doctor as directed: Write down your questions so you remember to ask them during your visits.

## 2021-12-22 NOTE — ED PROVIDER NOTE - OBJECTIVE STATEMENT
51 M hx hypothyroid, HTN, staph infection, renal CA (s/p L mass removal 2019) c/o fever (101.8-102F), chills, HA, sore throat, cough, myalgias, SOB x 4 days. Went to [facility] when symptoms started and had negative rapid covid, negative flu. States did not yet receive PCR results. Reports susceptibility to pneumonia. Took motrin, last dose last night, without much relief. Denies recent travel, known sick contacts.    Moderna x 2, last dose August

## 2021-12-22 NOTE — ED ADULT NURSE NOTE - OBJECTIVE STATEMENT
Complaining of cough, body aches, sore throat, headache started Sunday, went to Urgent care and tested negative Covid, now feels worst, has been taking Tylenol/ Motrin/ increase fluids at home. No acute sob noted at this time.

## 2021-12-25 ENCOUNTER — TRANSCRIPTION ENCOUNTER (OUTPATIENT)
Age: 51
End: 2021-12-25

## 2021-12-25 PROBLEM — U07.1 COVID-19 VIRUS INFECTION: Status: RESOLVED | Noted: 2021-12-25 | Resolved: 2021-12-25

## 2021-12-25 NOTE — PHYSICAL EXAM
[No Acute Distress] : no acute distress [Normal Sclera/Conjunctiva] : normal sclera/conjunctiva [Normal Outer Ear/Nose] : the outer ears and nose were normal in appearance [Supple] : supple [Normal Mood] : the mood was normal [No Respiratory Distress] : no respiratory distress  [de-identified] : nasal congestion

## 2021-12-25 NOTE — HISTORY OF PRESENT ILLNESS
[___ Days ago] : [unfilled] days ago [Paroxysmal] : paroxysmal [Cough] : cough [Shortness Of Breath] : shortness of breath [Headache] : headache [Fever] : fever [NSAIDs] : NSAIDs [At Night] : at night [In Morning] : in the morning [Worsening] : worsening [Home] : at home, [unfilled] , at the time of the visit. [Medical Office: ([facility])___] : at the medical office located in  [Verbal consent obtained from patient] : the patient, [unfilled] [FreeTextEntry2] : PND, dizziness [FreeTextEntry8] : patient was tested for COVID and FLU at Med [email or web address] which was negative. Patient's girlfriend's son had Covid.BP at home 140/70. Patient had Pneumonia in past twice with hospitalization once for it.

## 2021-12-25 NOTE — HEALTH RISK ASSESSMENT
[Never] : Never [No falls in past year] : Patient reported no falls in the past year [No] : In the past 12 months have you used drugs other than those required for medical reasons? No [0] : 2) Feeling down, depressed, or hopeless: Not at all (0) [PHQ-2 Negative - No further assessment needed] : PHQ-2 Negative - No further assessment needed [[id number]] : 0

## 2021-12-25 NOTE — PLAN
[FreeTextEntry1] : nebulizer. recommend management of URI symptoms.\par patient is utd with Covid shots.  cough medicine.\par  recommend to go to ER. \par

## 2021-12-25 NOTE — HISTORY OF PRESENT ILLNESS
[___ Days ago] : [unfilled] days ago [Paroxysmal] : paroxysmal [Cough] : cough [Shortness Of Breath] : shortness of breath [Headache] : headache [Fever] : fever [NSAIDs] : NSAIDs [At Night] : at night [In Morning] : in the morning [Worsening] : worsening [Home] : at home, [unfilled] , at the time of the visit. [Medical Office: ([facility])___] : at the medical office located in  [Verbal consent obtained from patient] : the patient, [unfilled] [FreeTextEntry2] : PND, dizziness [FreeTextEntry8] : patient was tested for COVID and FLU at [facility] which was negative. Patient's girlfriend's son had Covid.BP at home 140/70. Patient had Pneumonia in past twice with hospitalization once for it.  done

## 2021-12-25 NOTE — PHYSICAL EXAM
[No Acute Distress] : no acute distress [Normal Sclera/Conjunctiva] : normal sclera/conjunctiva [Normal Outer Ear/Nose] : the outer ears and nose were normal in appearance [Supple] : supple [Normal Mood] : the mood was normal [No Respiratory Distress] : no respiratory distress  [de-identified] : nasal congestion

## 2021-12-25 NOTE — HEALTH RISK ASSESSMENT
[Never] : Never [No] : In the past 12 months have you used drugs other than those required for medical reasons? No [No falls in past year] : Patient reported no falls in the past year [0] : 2) Feeling down, depressed, or hopeless: Not at all (0) [PHQ-2 Negative - No further assessment needed] : PHQ-2 Negative - No further assessment needed [[id number]] : 0

## 2021-12-25 NOTE — REVIEW OF SYSTEMS
[Fever] : fever [Chills] : chills [Fatigue] : fatigue [Postnasal Drip] : postnasal drip [Nasal Discharge] : nasal discharge [Sore Throat] : sore throat [Shortness Of Breath] : shortness of breath [Cough] : cough [Dizziness] : dizziness [Negative] : Heme/Lymph [Discharge] : no discharge [Earache] : no earache [Hearing Loss] : no hearing loss [Nosebleeds] : no nosebleeds [Hoarseness] : no hoarseness [Wheezing] : no wheezing [Joint Pain] : no joint pain [Joint Stiffness] : no joint stiffness [Muscle Pain] : no muscle pain [Muscle Weakness] : no muscle weakness [Back Pain] : no back pain [Joint Swelling] : no joint swelling [Headache] : no headache [FreeTextEntry9] : neck pain

## 2021-12-27 LAB
CULTURE RESULTS: SIGNIFICANT CHANGE UP
CULTURE RESULTS: SIGNIFICANT CHANGE UP
SPECIMEN SOURCE: SIGNIFICANT CHANGE UP
SPECIMEN SOURCE: SIGNIFICANT CHANGE UP

## 2022-01-10 NOTE — ED PROCEDURE NOTE - CPROC ED COMPLICATIONS1
You have been seen and evaluated. We discussed the results of your visit. Please follow up with your PCP in 7-10 days to have your staples removed. You can take ibuprofen or tylenol as needed for headaches. Please return to ER if your headache suddenly worsens, you have changes in your vision, or if you develop any other new or concerning symptoms. Thanks for coming in to see us today. We hope you feel better.  
no

## 2022-01-12 ENCOUNTER — RX RENEWAL (OUTPATIENT)
Age: 52
End: 2022-01-12

## 2022-03-30 ENCOUNTER — RX RENEWAL (OUTPATIENT)
Age: 52
End: 2022-03-30

## 2022-04-04 ENCOUNTER — APPOINTMENT (OUTPATIENT)
Age: 52
End: 2022-04-04
Payer: MEDICARE

## 2022-04-04 VITALS
SYSTOLIC BLOOD PRESSURE: 114 MMHG | WEIGHT: 210 LBS | HEIGHT: 69 IN | BODY MASS INDEX: 31.1 KG/M2 | OXYGEN SATURATION: 98 % | HEART RATE: 80 BPM | TEMPERATURE: 97.1 F | DIASTOLIC BLOOD PRESSURE: 76 MMHG | RESPIRATION RATE: 16 BRPM

## 2022-04-04 DIAGNOSIS — Z87.09 PERSONAL HISTORY OF OTHER DISEASES OF THE RESPIRATORY SYSTEM: ICD-10-CM

## 2022-04-04 DIAGNOSIS — J06.9 ACUTE UPPER RESPIRATORY INFECTION, UNSPECIFIED: ICD-10-CM

## 2022-04-04 DIAGNOSIS — R33.8 OTHER RETENTION OF URINE: ICD-10-CM

## 2022-04-04 PROCEDURE — 36415 COLL VENOUS BLD VENIPUNCTURE: CPT

## 2022-04-04 PROCEDURE — 99214 OFFICE O/P EST MOD 30 MIN: CPT | Mod: 25

## 2022-04-04 RX ORDER — FLUTICASONE PROPIONATE 44 UG/1
44 AEROSOL, METERED RESPIRATORY (INHALATION)
Qty: 1 | Refills: 3 | Status: DISCONTINUED | COMMUNITY
Start: 2021-09-21 | End: 2022-04-04

## 2022-04-04 RX ORDER — ALBUTEROL SULFATE 1.25 MG/3ML
1.25 SOLUTION RESPIRATORY (INHALATION)
Qty: 1 | Refills: 1 | Status: DISCONTINUED | COMMUNITY
Start: 2021-12-20 | End: 2022-04-04

## 2022-04-04 RX ORDER — ALBUTEROL SULFATE 90 UG/1
108 (90 BASE) AEROSOL, METERED RESPIRATORY (INHALATION)
Qty: 1 | Refills: 3 | Status: DISCONTINUED | COMMUNITY
Start: 2021-09-21 | End: 2022-04-04

## 2022-04-04 NOTE — HEALTH RISK ASSESSMENT
[No falls in past year] : Patient reported no falls in the past year [0] : 2) Feeling down, depressed, or hopeless: Not at all (0) [PHQ-2 Negative - No further assessment needed] : PHQ-2 Negative - No further assessment needed [With Patient/Caregiver] : , with patient/caregiver [Never] : Never [No] : In the past 12 months have you used drugs other than those required for medical reasons? No [de-identified] : works out regularly  [de-identified] : intermittent fasting  [[id number]] : 0 [AdvancecareDate] : 04/22

## 2022-04-04 NOTE — REVIEW OF SYSTEMS
[Negative] : Heme/Lymph [Fatigue] : fatigue [Fever] : no fever [Night Sweats] : no night sweats [Recent Change In Weight] : ~T no recent weight change

## 2022-04-04 NOTE — HISTORY OF PRESENT ILLNESS
[FreeTextEntry1] : Hypothyroid, HTN, asthma [de-identified] : Patient is here for check up Obesity, Hypothyroid. HTN, asthma, anemia. He is here for medicine refills.\par He os AAOx3,NAD. no chest pain, SOB, fever, chills, cough. He c/o chronci fatigue and Vitamin D helps.Here for comprehensive labs. \par He is eating healthy diet. He has been working out and has lost weight. He is asking to complete form he is required to fill out for His disability status update for leisure pass.

## 2022-04-04 NOTE — PLAN
[FreeTextEntry1] : \par HTN: loW Sodium diet.Metoprolol.\par hypothyroid: Synthroid .\par asthma: inhaler.\par Obesity: patient had successful weight loss.\par D 3 supplement.\par CA kidney- pt. sees oncology and urology and is stable.\par Lesure pass form filled out.

## 2022-04-06 LAB
25(OH)D3 SERPL-MCNC: 56.5 NG/ML
ALBUMIN SERPL ELPH-MCNC: 4.7 G/DL
ALP BLD-CCNC: 88 U/L
ALT SERPL-CCNC: 17 U/L
ANION GAP SERPL CALC-SCNC: 13 MMOL/L
APPEARANCE: CLEAR
AST SERPL-CCNC: 16 U/L
BACTERIA: NEGATIVE
BASOPHILS # BLD AUTO: 0.05 K/UL
BASOPHILS NFR BLD AUTO: 0.7 %
BILIRUB SERPL-MCNC: 0.8 MG/DL
BILIRUBIN URINE: NEGATIVE
BLOOD URINE: NEGATIVE
BUN SERPL-MCNC: 18 MG/DL
CALCIUM SERPL-MCNC: 9.5 MG/DL
CHLORIDE SERPL-SCNC: 105 MMOL/L
CHOLEST SERPL-MCNC: 190 MG/DL
CO2 SERPL-SCNC: 23 MMOL/L
COLOR: YELLOW
CREAT SERPL-MCNC: 0.9 MG/DL
EGFR: 103 ML/MIN/1.73M2
EOSINOPHIL # BLD AUTO: 0.19 K/UL
EOSINOPHIL NFR BLD AUTO: 2.8 %
ESTIMATED AVERAGE GLUCOSE: 108 MG/DL
GLUCOSE QUALITATIVE U: NEGATIVE
GLUCOSE SERPL-MCNC: 95 MG/DL
HBA1C MFR BLD HPLC: 5.4 %
HCT VFR BLD CALC: 44.8 %
HDLC SERPL-MCNC: 59 MG/DL
HGB BLD-MCNC: 14.1 G/DL
HYALINE CASTS: 1 /LPF
IMM GRANULOCYTES NFR BLD AUTO: 0.3 %
KETONES URINE: NEGATIVE
LDLC SERPL CALC-MCNC: 109 MG/DL
LEUKOCYTE ESTERASE URINE: NEGATIVE
LYMPHOCYTES # BLD AUTO: 1.67 K/UL
LYMPHOCYTES NFR BLD AUTO: 24.7 %
MAN DIFF?: NORMAL
MCHC RBC-ENTMCNC: 29.3 PG
MCHC RBC-ENTMCNC: 31.5 GM/DL
MCV RBC AUTO: 92.9 FL
MICROSCOPIC-UA: NORMAL
MONOCYTES # BLD AUTO: 0.69 K/UL
MONOCYTES NFR BLD AUTO: 10.2 %
NEUTROPHILS # BLD AUTO: 4.13 K/UL
NEUTROPHILS NFR BLD AUTO: 61.3 %
NITRITE URINE: NEGATIVE
NONHDLC SERPL-MCNC: 131 MG/DL
PH URINE: 6
PLATELET # BLD AUTO: 258 K/UL
POTASSIUM SERPL-SCNC: 4.7 MMOL/L
PROT SERPL-MCNC: 6.8 G/DL
PROTEIN URINE: NEGATIVE
RBC # BLD: 4.82 M/UL
RBC # FLD: 12.6 %
RED BLOOD CELLS URINE: 1 /HPF
SODIUM SERPL-SCNC: 141 MMOL/L
SPECIFIC GRAVITY URINE: 1.02
SQUAMOUS EPITHELIAL CELLS: 0 /HPF
T4 FREE SERPL-MCNC: 1.8 NG/DL
TRIGL SERPL-MCNC: 108 MG/DL
TSH SERPL-ACNC: 0.82 UIU/ML
UROBILINOGEN URINE: NORMAL
WBC # FLD AUTO: 6.75 K/UL
WHITE BLOOD CELLS URINE: 0 /HPF

## 2022-06-14 ENCOUNTER — APPOINTMENT (OUTPATIENT)
Dept: PAIN MANAGEMENT | Facility: CLINIC | Age: 52
End: 2022-06-14
Payer: MEDICARE

## 2022-06-14 VITALS
SYSTOLIC BLOOD PRESSURE: 113 MMHG | BODY MASS INDEX: 30.81 KG/M2 | HEIGHT: 69 IN | WEIGHT: 208 LBS | DIASTOLIC BLOOD PRESSURE: 73 MMHG | HEART RATE: 62 BPM

## 2022-06-14 PROCEDURE — 99214 OFFICE O/P EST MOD 30 MIN: CPT

## 2022-06-14 RX ORDER — TOPIRAMATE 25 MG/1
25 TABLET, FILM COATED ORAL
Qty: 30 | Refills: 0 | Status: DISCONTINUED | COMMUNITY
Start: 2021-03-17 | End: 2022-06-14

## 2022-06-14 RX ORDER — PROPRANOLOL HYDROCHLORIDE 20 MG/1
20 TABLET ORAL
Qty: 30 | Refills: 2 | Status: DISCONTINUED | COMMUNITY
Start: 2021-03-24 | End: 2022-06-14

## 2022-06-14 NOTE — HISTORY OF PRESENT ILLNESS
[FreeTextEntry1] : 52 y/o M with Pmhx HTN, hypothyroidism, asthma, anxiety, chronic back pain s/p multiple fusions as well as chronic migraines who presents today for follow up and MM recertification. Today he reports chronic lower back pain 8/10 on average that is constant.  Constant head pressure that can become severe HA lasting 4 days. patient was certified for MM last year and has not filled at dispensary yet. He has tried MM that was given to him from his sister.  Denies hallucination, dysrhythmia. \par \par Previous meds: Propranolol, Topamax \par Current meds include: Xanax, Quetiapine 25 mg q hs for sleep.

## 2022-06-14 NOTE — ASSESSMENT
[FreeTextEntry1] : 50 y/o M with chronic pain, chronic daily headaches. \par \par \par \par I-Stop reviewed,  reference #: [id number]\par Urine drug screen test was performed \par Mr. RACHEL OHARA denies any history of psychosis, immediate family history of psychosis or arrhythmia. In my opinion He would benefit from medical marijuana. In regards to ratio, I believe He would benefit from a one-to-one plus a low THC: high CBD. He  has been advised of the potential risks and benefits of this agent. He has also been advised been advised not to drive up to four hours after taking medical marijuana.Patient has signed and fully understands Medical Cannabis Treatment Agreement our guidelines for prescription medication/cannabis and drug screening.  Medical marijuana agreement was reviewed and signed by patient. \par fu in 6 months \par \par \par \par \par \par

## 2022-06-27 ENCOUNTER — APPOINTMENT (OUTPATIENT)
Dept: FAMILY MEDICINE | Facility: CLINIC | Age: 52
End: 2022-06-27

## 2022-06-27 VITALS
OXYGEN SATURATION: 95 % | SYSTOLIC BLOOD PRESSURE: 122 MMHG | TEMPERATURE: 97.5 F | DIASTOLIC BLOOD PRESSURE: 82 MMHG | HEART RATE: 70 BPM | RESPIRATION RATE: 16 BRPM | BODY MASS INDEX: 30.81 KG/M2 | HEIGHT: 69 IN | WEIGHT: 208 LBS

## 2022-06-27 DIAGNOSIS — B35.1 TINEA UNGUIUM: ICD-10-CM

## 2022-06-27 PROCEDURE — 99214 OFFICE O/P EST MOD 30 MIN: CPT

## 2022-06-30 NOTE — HISTORY OF PRESENT ILLNESS
[FreeTextEntry8] : c/o 3 back lesions for 3 days.sudden onset, with crusting,  painful, no fever. He used cortisone on it. no sick contacts.\par  also wants medicine for toe nail fungus, chronic , no relief with topical remedy.

## 2022-06-30 NOTE — PHYSICAL EXAM
[No Acute Distress] : no acute distress [Well Nourished] : well nourished [Well Developed] : well developed [Well-Appearing] : well-appearing [Normal Sclera/Conjunctiva] : normal sclera/conjunctiva [PERRL] : pupils equal round and reactive to light [EOMI] : extraocular movements intact [Normal Outer Ear/Nose] : the outer ears and nose were normal in appearance [Normal Oropharynx] : the oropharynx was normal [No JVD] : no jugular venous distention [No Lymphadenopathy] : no lymphadenopathy [Supple] : supple [Thyroid Normal, No Nodules] : the thyroid was normal and there were no nodules present [No Respiratory Distress] : no respiratory distress  [No Accessory Muscle Use] : no accessory muscle use [Clear to Auscultation] : lungs were clear to auscultation bilaterally [Normal Rate] : normal rate  [Regular Rhythm] : with a regular rhythm [Normal S1, S2] : normal S1 and S2 [No Murmur] : no murmur heard [No Carotid Bruits] : no carotid bruits [No Abdominal Bruit] : a ~M bruit was not heard ~T in the abdomen [No Varicosities] : no varicosities [Pedal Pulses Present] : the pedal pulses are present [No Edema] : there was no peripheral edema [No Palpable Aorta] : no palpable aorta [No Extremity Clubbing/Cyanosis] : no extremity clubbing/cyanosis [Soft] : abdomen soft [Non Tender] : non-tender [Non-distended] : non-distended [No Masses] : no abdominal mass palpated [No HSM] : no HSM [Normal Bowel Sounds] : normal bowel sounds [Normal Posterior Cervical Nodes] : no posterior cervical lymphadenopathy [Normal Anterior Cervical Nodes] : no anterior cervical lymphadenopathy [No CVA Tenderness] : no CVA  tenderness [No Spinal Tenderness] : no spinal tenderness [No Joint Swelling] : no joint swelling [Grossly Normal Strength/Tone] : grossly normal strength/tone [Coordination Grossly Intact] : coordination grossly intact [No Focal Deficits] : no focal deficits [Normal Gait] : normal gait [Deep Tendon Reflexes (DTR)] : deep tendon reflexes were 2+ and symmetric [Normal Affect] : the affect was normal [Normal Insight/Judgement] : insight and judgment were intact [de-identified] : skin open sores 3 along the back incision, onychomycosis great toe nails

## 2022-06-30 NOTE — PLAN
[FreeTextEntry1] : Monitor LFts periodically, drug info. given to patient. \par  keep skin dry, start antibiotic.

## 2022-07-06 ENCOUNTER — RESULT REVIEW (OUTPATIENT)
Age: 52
End: 2022-07-06

## 2022-07-06 ENCOUNTER — APPOINTMENT (OUTPATIENT)
Dept: MRI IMAGING | Facility: HOSPITAL | Age: 52
End: 2022-07-06

## 2022-07-06 ENCOUNTER — OUTPATIENT (OUTPATIENT)
Dept: OUTPATIENT SERVICES | Facility: HOSPITAL | Age: 52
LOS: 1 days | End: 2022-07-06
Payer: MEDICARE

## 2022-07-06 ENCOUNTER — APPOINTMENT (OUTPATIENT)
Dept: FAMILY MEDICINE | Facility: CLINIC | Age: 52
End: 2022-07-06

## 2022-07-06 VITALS
OXYGEN SATURATION: 97 % | RESPIRATION RATE: 16 BRPM | HEIGHT: 69 IN | TEMPERATURE: 97.5 F | SYSTOLIC BLOOD PRESSURE: 116 MMHG | HEART RATE: 65 BPM | DIASTOLIC BLOOD PRESSURE: 82 MMHG

## 2022-07-06 DIAGNOSIS — M12.9 ARTHROPATHY, UNSPECIFIED: Chronic | ICD-10-CM

## 2022-07-06 DIAGNOSIS — R06.83 SNORING: Chronic | ICD-10-CM

## 2022-07-06 DIAGNOSIS — Z98.1 ARTHRODESIS STATUS: Chronic | ICD-10-CM

## 2022-07-06 DIAGNOSIS — R51.9 HEADACHE, UNSPECIFIED: ICD-10-CM

## 2022-07-06 DIAGNOSIS — M17.10 UNILATERAL PRIMARY OSTEOARTHRITIS, UNSPECIFIED KNEE: Chronic | ICD-10-CM

## 2022-07-06 DIAGNOSIS — R22.0 LOCALIZED SWELLING, MASS AND LUMP, HEAD: ICD-10-CM

## 2022-07-06 PROCEDURE — 70547 MR ANGIOGRAPHY NECK W/O DYE: CPT

## 2022-07-06 PROCEDURE — 70547 MR ANGIOGRAPHY NECK W/O DYE: CPT | Mod: 26

## 2022-07-06 PROCEDURE — 36415 COLL VENOUS BLD VENIPUNCTURE: CPT

## 2022-07-06 PROCEDURE — 70544 MR ANGIOGRAPHY HEAD W/O DYE: CPT

## 2022-07-06 PROCEDURE — 70544 MR ANGIOGRAPHY HEAD W/O DYE: CPT | Mod: 26,59

## 2022-07-06 PROCEDURE — 70544 MR ANGIOGRAPHY HEAD W/O DYE: CPT | Mod: 26,76

## 2022-07-06 PROCEDURE — 99214 OFFICE O/P EST MOD 30 MIN: CPT | Mod: 25

## 2022-07-07 LAB
BASOPHILS # BLD AUTO: 0.04 K/UL
BASOPHILS NFR BLD AUTO: 0.6 %
CRP SERPL-MCNC: <3 MG/L
EOSINOPHIL # BLD AUTO: 0.19 K/UL
EOSINOPHIL NFR BLD AUTO: 3.1 %
ERYTHROCYTE [SEDIMENTATION RATE] IN BLOOD BY WESTERGREN METHOD: 8 MM/HR
HCT VFR BLD CALC: 43.1 %
HGB BLD-MCNC: 13.5 G/DL
IMM GRANULOCYTES NFR BLD AUTO: 0.3 %
LYMPHOCYTES # BLD AUTO: 1.56 K/UL
LYMPHOCYTES NFR BLD AUTO: 25.1 %
MAN DIFF?: NORMAL
MCHC RBC-ENTMCNC: 28.8 PG
MCHC RBC-ENTMCNC: 31.3 GM/DL
MCV RBC AUTO: 92.1 FL
MONOCYTES # BLD AUTO: 0.59 K/UL
MONOCYTES NFR BLD AUTO: 9.5 %
NEUTROPHILS # BLD AUTO: 3.82 K/UL
NEUTROPHILS NFR BLD AUTO: 61.4 %
PLATELET # BLD AUTO: 245 K/UL
RBC # BLD: 4.68 M/UL
RBC # FLD: 13.2 %
WBC # FLD AUTO: 6.22 K/UL

## 2022-07-12 NOTE — PLAN
[FreeTextEntry1] : labs obtained.\par  Ice application.\par  Tylenol prn.\par  Arterial imaging. f/u neurology for headache , dermatology for scalp lesion.

## 2022-07-12 NOTE — HISTORY OF PRESENT ILLNESS
[FreeTextEntry8] : c/o scalp swelling right sided  for few days which is tender. He also has scalp tenderness on affected side. No redness, fever, chills. Skin rash on back has dried and scabbed over. He finished antibiotics. 
Pass: Puree Diet, observe meals, advance as indicated

## 2022-07-12 NOTE — PHYSICAL EXAM
[No Acute Distress] : no acute distress [Well Nourished] : well nourished [Well Developed] : well developed [Well-Appearing] : well-appearing [Normal Sclera/Conjunctiva] : normal sclera/conjunctiva [PERRL] : pupils equal round and reactive to light [EOMI] : extraocular movements intact [Normal Outer Ear/Nose] : the outer ears and nose were normal in appearance [Normal Oropharynx] : the oropharynx was normal [No JVD] : no jugular venous distention [No Lymphadenopathy] : no lymphadenopathy [Supple] : supple [Thyroid Normal, No Nodules] : the thyroid was normal and there were no nodules present [No Respiratory Distress] : no respiratory distress  [No Accessory Muscle Use] : no accessory muscle use [Clear to Auscultation] : lungs were clear to auscultation bilaterally [Normal Rate] : normal rate  [Regular Rhythm] : with a regular rhythm [Normal S1, S2] : normal S1 and S2 [No Murmur] : no murmur heard [No Carotid Bruits] : no carotid bruits [No Abdominal Bruit] : a ~M bruit was not heard ~T in the abdomen [No Varicosities] : no varicosities [Pedal Pulses Present] : the pedal pulses are present [No Edema] : there was no peripheral edema [No Palpable Aorta] : no palpable aorta [No Extremity Clubbing/Cyanosis] : no extremity clubbing/cyanosis [Soft] : abdomen soft [Non Tender] : non-tender [Non-distended] : non-distended [No Masses] : no abdominal mass palpated [No HSM] : no HSM [Normal Bowel Sounds] : normal bowel sounds [Normal Posterior Cervical Nodes] : no posterior cervical lymphadenopathy [Normal Anterior Cervical Nodes] : no anterior cervical lymphadenopathy [No CVA Tenderness] : no CVA  tenderness [No Spinal Tenderness] : no spinal tenderness [No Joint Swelling] : no joint swelling [Grossly Normal Strength/Tone] : grossly normal strength/tone [Coordination Grossly Intact] : coordination grossly intact [No Focal Deficits] : no focal deficits [Normal Gait] : normal gait [Deep Tendon Reflexes (DTR)] : deep tendon reflexes were 2+ and symmetric [Normal Affect] : the affect was normal [Normal Insight/Judgement] : insight and judgment were intact [de-identified] : scabbed dried 3 skin patches on lower back around incision, right sided scalp fluctuant swelling near  vertex, tenderness in affected scalp side

## 2022-08-24 ENCOUNTER — APPOINTMENT (OUTPATIENT)
Dept: PAIN MANAGEMENT | Facility: CLINIC | Age: 52
End: 2022-08-24

## 2022-08-24 VITALS
HEIGHT: 69 IN | DIASTOLIC BLOOD PRESSURE: 88 MMHG | HEART RATE: 65 BPM | SYSTOLIC BLOOD PRESSURE: 139 MMHG | BODY MASS INDEX: 31.55 KG/M2 | WEIGHT: 213 LBS

## 2022-08-24 PROCEDURE — 99213 OFFICE O/P EST LOW 20 MIN: CPT

## 2022-08-24 NOTE — ASSESSMENT
[FreeTextEntry1] : 50 y/o M with chronic pain, chronic daily headaches resolved with the use of MM .  \par \par Recertification extended on this visit. \par Urine drug screen reviewed and c/w prescribed meds. \par fu in 6 months or sooner if needed. \par I-Stop reviewed,  reference #: [id number]\par \par Mr. RACHEL OHARA denies any history of psychosis, immediate family history of psychosis or arrhythmia. In my opinion He would benefit from medical marijuana. In regards to ratio, I believe He would benefit from a one-to-one plus a low THC: high CBD. He  has been advised of the potential risks and benefits of this agent. He has also been advised been advised not to drive up to four hours after taking medical marijuana.Patient has signed and fully understands Medical Cannabis Treatment Agreement our guidelines for prescription medication/cannabis and drug screening.  Medical marijuana agreement was reviewed and signed by patient. \par \par \par \par \par \par \par

## 2022-08-24 NOTE — HISTORY OF PRESENT ILLNESS
[FreeTextEntry1] : 50 y/o M with Pmhx HTN, hypothyroidism, asthma, anxiety, chronic back pain s/p multiple fusions as well as chronic migraines who presents today for follow up and MM recertification.  Since his last visit, reports no new medical issues or AE. He has been using MM which he feels has significantly reduced the intensity and frequency of his migraines.  He is using 20:1 or 1:1 nightly and has helped with sleep as well, no longer taking Xanax.  \par \par He has tried MM that was given to him from his sister.  Denies hallucination, dysrhythmia. \par \par Previous meds: Propranolol, Topamax, Xanax \par Current meds include: Quetiapine 25 mg q hs for sleep.

## 2022-08-29 ENCOUNTER — RX RENEWAL (OUTPATIENT)
Age: 52
End: 2022-08-29

## 2022-10-03 NOTE — PROGRESS NOTE ADULT - PROBLEM/PLAN-2
ASQ-3 Screen    Results: All Reassuring/Routine Follow-up   Communication: Reassuring Score: 60   Gross Motor: Reassuring Score: 45   Fine Motor: Reassuring Score: 40   Problem Solving: Reassuring Score: 45   Personal-Social: Reassuring Score: 40   Other Concerns:             Disposition: Continue current therapy   M-CHAT Performed?: No          
DISPLAY PLAN FREE TEXT

## 2022-10-06 ENCOUNTER — RX RENEWAL (OUTPATIENT)
Age: 52
End: 2022-10-06

## 2022-11-16 ENCOUNTER — NON-APPOINTMENT (OUTPATIENT)
Age: 52
End: 2022-11-16

## 2022-12-05 ENCOUNTER — NON-APPOINTMENT (OUTPATIENT)
Age: 52
End: 2022-12-05

## 2022-12-05 ENCOUNTER — APPOINTMENT (OUTPATIENT)
Dept: FAMILY MEDICINE | Facility: CLINIC | Age: 52
End: 2022-12-05

## 2022-12-05 VITALS
WEIGHT: 213 LBS | OXYGEN SATURATION: 96 % | HEART RATE: 72 BPM | RESPIRATION RATE: 18 BRPM | DIASTOLIC BLOOD PRESSURE: 82 MMHG | SYSTOLIC BLOOD PRESSURE: 140 MMHG | BODY MASS INDEX: 31.55 KG/M2 | HEIGHT: 69 IN | TEMPERATURE: 97.7 F

## 2022-12-05 DIAGNOSIS — Z87.39 PERSONAL HISTORY OF OTHER DISEASES OF THE MUSCULOSKELETAL SYSTEM AND CONNECTIVE TISSUE: ICD-10-CM

## 2022-12-05 DIAGNOSIS — R22.0 LOCALIZED SWELLING, MASS AND LUMP, HEAD: ICD-10-CM

## 2022-12-05 DIAGNOSIS — E66.9 OBESITY, UNSPECIFIED: ICD-10-CM

## 2022-12-05 DIAGNOSIS — S69.91XA UNSPECIFIED INJURY OF RIGHT WRIST, HAND AND FINGER(S), INITIAL ENCOUNTER: ICD-10-CM

## 2022-12-05 DIAGNOSIS — Z87.898 PERSONAL HISTORY OF OTHER SPECIFIED CONDITIONS: ICD-10-CM

## 2022-12-05 DIAGNOSIS — L08.9 LOCAL INFECTION OF THE SKIN AND SUBCUTANEOUS TISSUE, UNSPECIFIED: ICD-10-CM

## 2022-12-05 DIAGNOSIS — R06.00 DYSPNEA, UNSPECIFIED: ICD-10-CM

## 2022-12-05 DIAGNOSIS — Z00.00 ENCOUNTER FOR GENERAL ADULT MEDICAL EXAMINATION W/OUT ABNORMAL FINDINGS: ICD-10-CM

## 2022-12-05 DIAGNOSIS — Z23 ENCOUNTER FOR IMMUNIZATION: ICD-10-CM

## 2022-12-05 DIAGNOSIS — Z01.818 ENCOUNTER FOR OTHER PREPROCEDURAL EXAMINATION: ICD-10-CM

## 2022-12-05 PROCEDURE — 93000 ELECTROCARDIOGRAM COMPLETE: CPT

## 2022-12-05 PROCEDURE — 36415 COLL VENOUS BLD VENIPUNCTURE: CPT

## 2022-12-05 PROCEDURE — 99396 PREV VISIT EST AGE 40-64: CPT | Mod: 25

## 2022-12-05 RX ORDER — NEBULIZER ACCESSORIES
KIT MISCELLANEOUS
Qty: 1 | Refills: 0 | Status: DISCONTINUED | COMMUNITY
Start: 2021-12-20 | End: 2022-12-05

## 2022-12-05 RX ORDER — CEPHALEXIN 500 MG/1
500 CAPSULE ORAL 3 TIMES DAILY
Qty: 21 | Refills: 0 | Status: DISCONTINUED | COMMUNITY
Start: 2022-06-27 | End: 2022-12-05

## 2022-12-05 RX ORDER — CHOLECALCIFEROL (VITAMIN D3) 1250 MCG
1.25 MG CAPSULE ORAL
Qty: 4 | Refills: 0 | Status: DISCONTINUED | COMMUNITY
Start: 2019-06-13 | End: 2022-12-05

## 2022-12-06 PROBLEM — E66.9 OBESITY: Status: ACTIVE | Noted: 2018-11-14

## 2022-12-06 NOTE — HISTORY OF PRESENT ILLNESS
No [FreeTextEntry1] : see HPI  [de-identified] : Here for CPE. He is taking medicine to lower blood pressure.  He has h/o kidney cancer and is due to see urologist. He is here for comprehensive labs.

## 2022-12-06 NOTE — HEALTH RISK ASSESSMENT
[Good] : ~his/her~  mood as  good [No] : In the past 12 months have you used drugs other than those required for medical reasons? No [No falls in past year] : Patient reported no falls in the past year [0] : 2) Feeling down, depressed, or hopeless: Not at all (0) [PHQ-2 Negative - No further assessment needed] : PHQ-2 Negative - No further assessment needed [Patient reported colonoscopy was normal] : Patient reported colonoscopy was normal [HIV test declined] : HIV test declined [Hepatitis C test declined] : Hepatitis C test declined [None] : None [Alone] : lives alone [On disability] : on disability [College] : College [] :  [Sexually Active] : sexually active [Feels Safe at Home] : Feels safe at home [Fully functional (bathing, dressing, toileting, transferring, walking, feeding)] : Fully functional (bathing, dressing, toileting, transferring, walking, feeding) [Fully functional (using the telephone, shopping, preparing meals, housekeeping, doing laundry, using] : Fully functional and needs no help or supervision to perform IADLs (using the telephone, shopping, preparing meals, housekeeping, doing laundry, using transportation, managing medications and managing finances) [Independent] : managing finances [Reports normal functional visual acuity (ie: able to read med bottle)] : Reports normal functional visual acuity [Smoke Detector] : smoke detector [Carbon Monoxide Detector] : carbon monoxide detector [Seat Belt] :  uses seat belt [Sunscreen] : uses sunscreen [With Patient/Caregiver] : , with patient/caregiver [Never] : Never [de-identified] : active  [de-identified] : regular [HHA4Mwsrg] : 0 [EyeExamDate] : MM/20 [Change in mental status noted] : No change in mental status noted [Language] : denies difficulty with language [Behavior] : denies difficulty with behavior [Learning/Retaining New Information] : denies difficulty learning/retaining new information [Handling Complex Tasks] : denies difficulty handling complex tasks [Reasoning] : denies difficulty with reasoning [Spatial Ability and Orientation] : denies difficulty with spatial ability and orientation [Reports changes in vision] : Reports no changes in vision [Reports changes in dental health] : Reports no changes in dental health [ColonoscopyDate] : 01/19 [AdvancecareDate] : 12/22

## 2022-12-06 NOTE — PLAN
[FreeTextEntry1] :  Metoprolol to 25 mg 1.5 tab daily. DASH diet.\par  f/u urology.low chol. diet.\par Asthma: stable,  Bronchodilator. \par Labs obtained.

## 2022-12-07 LAB
25(OH)D3 SERPL-MCNC: 32.6 NG/ML
ALBUMIN SERPL ELPH-MCNC: 4.9 G/DL
ALP BLD-CCNC: 88 U/L
ALT SERPL-CCNC: 21 U/L
ANION GAP SERPL CALC-SCNC: 11 MMOL/L
APPEARANCE: CLEAR
AST SERPL-CCNC: 18 U/L
BACTERIA: NEGATIVE
BASOPHILS # BLD AUTO: 0.04 K/UL
BASOPHILS NFR BLD AUTO: 0.6 %
BILIRUB SERPL-MCNC: 0.9 MG/DL
BILIRUBIN URINE: NEGATIVE
BLOOD URINE: NEGATIVE
BUN SERPL-MCNC: 15 MG/DL
CALCIUM SERPL-MCNC: 9.9 MG/DL
CHLORIDE SERPL-SCNC: 105 MMOL/L
CHOLEST SERPL-MCNC: 191 MG/DL
CO2 SERPL-SCNC: 24 MMOL/L
COLOR: YELLOW
CREAT SERPL-MCNC: 0.89 MG/DL
CREAT SPEC-SCNC: 157 MG/DL
CREAT/PROT UR: 0.1 RATIO
EGFR: 103 ML/MIN/1.73M2
EOSINOPHIL # BLD AUTO: 0.05 K/UL
EOSINOPHIL NFR BLD AUTO: 0.8 %
ESTIMATED AVERAGE GLUCOSE: 114 MG/DL
GLUCOSE QUALITATIVE U: NEGATIVE
GLUCOSE SERPL-MCNC: 98 MG/DL
HBA1C MFR BLD HPLC: 5.6 %
HCT VFR BLD CALC: 45.6 %
HDLC SERPL-MCNC: 67 MG/DL
HGB BLD-MCNC: 14.3 G/DL
HYALINE CASTS: 0 /LPF
IMM GRANULOCYTES NFR BLD AUTO: 0 %
KETONES URINE: NEGATIVE
LDLC SERPL CALC-MCNC: 110 MG/DL
LEUKOCYTE ESTERASE URINE: NEGATIVE
LYMPHOCYTES # BLD AUTO: 1.26 K/UL
LYMPHOCYTES NFR BLD AUTO: 19.1 %
MAN DIFF?: NORMAL
MCHC RBC-ENTMCNC: 28.9 PG
MCHC RBC-ENTMCNC: 31.4 GM/DL
MCV RBC AUTO: 92.1 FL
MICROSCOPIC-UA: NORMAL
MONOCYTES # BLD AUTO: 0.5 K/UL
MONOCYTES NFR BLD AUTO: 7.6 %
NEUTROPHILS # BLD AUTO: 4.73 K/UL
NEUTROPHILS NFR BLD AUTO: 71.9 %
NITRITE URINE: NEGATIVE
NONHDLC SERPL-MCNC: 124 MG/DL
PH URINE: 6
PLATELET # BLD AUTO: 309 K/UL
POTASSIUM SERPL-SCNC: 5 MMOL/L
PROT SERPL-MCNC: 7 G/DL
PROT UR-MCNC: 10 MG/DL
PROTEIN URINE: NORMAL
PSA SERPL-MCNC: 1.31 NG/ML
RBC # BLD: 4.95 M/UL
RBC # FLD: 13.2 %
RED BLOOD CELLS URINE: 2 /HPF
SODIUM SERPL-SCNC: 141 MMOL/L
SPECIFIC GRAVITY URINE: 1.03
SQUAMOUS EPITHELIAL CELLS: 0 /HPF
T3FREE SERPL-MCNC: 2.65 PG/ML
T4 FREE SERPL-MCNC: 1.8 NG/DL
THYROGLOB AB SERPL-ACNC: <20 IU/ML
THYROPEROXIDASE AB SERPL IA-ACNC: <10 IU/ML
TRIGL SERPL-MCNC: 68 MG/DL
TSH SERPL-ACNC: 1.2 UIU/ML
UROBILINOGEN URINE: NORMAL
WBC # FLD AUTO: 6.58 K/UL
WHITE BLOOD CELLS URINE: 0 /HPF

## 2023-01-13 NOTE — REVIEW OF SYSTEMS
DATE OF VISIT: 1/13/2023      REASON FOR VISIT: MPN/MDS overlap syndrome, anemia, thrombocytosis      HISTORY OF PRESENT ILLNESS:   66-year-old male with medical problem consisting of hypertension, MPN/MDS overlap syndrome for which patient is currently on hydroxyurea, anemia, thrombocytosis is here for follow-up appointment today.  States his fatigue is improved.  Denies any  Ankle ulceration.  Denies any excessive nausea or vomiting or diarrhea with treatment.  Denies any new chest pain or shortness of breath.  Denies any bleeding.            Oncology history:    1.  MPN/MDS overlap syndrome:  -Bone marrow biopsy done at [facility] on March 23, 2020 is consistent with MPN/MDS overlap syndrome.  - Patient was on hydroxyurea until September 17, 2020 due to severe anemia with hemoglobin of 7.3 and neutropenia with absolute neutrophil count of 1.45 hydroxyurea has been discontinued.  - CBC done on September 25, 2020 shows white blood cell count is 3.97, hemoglobin is 7.7, platelet count is 432,000.     -Hydroxyurea was discontinued on September 17 2020.  -Hydroxyurea was started back on October 2, 2020 at dose of 500 mg p.o. daily.  - dose of hydroxyurea was increased to 1500 mg p.o. daily on November 20, 2020.  -Due to worsening cytopenia, dose of hydroxyurea was changed to 1500 mg p.o. on Monday Wednesday Friday and rest of the week 1000 mg on January 8, 2021  -Dose of hydroxyurea was changed to 1500 mg p.o. daily on Monday Wednesday and Friday and rest of the week 1000 mg p.o. daily on July 14, 2021              Past Medical History, Past Surgical History, Social History, Family History have been reviewed and are without significant changes except as mentioned.    Review of Systems   A comprehensive 14 point review of systems was performed and was negative except as mentioned in HPI.    Medications:  The current medication list was reviewed in the EMR    ALLERGIES:    Allergies   Allergen Reactions   • Other Rash      Pt was working with walnut wood and broke out in a rash   • Tetracycline Rash       Objective      Vitals:    01/13/23 0854   BP: 174/64   Pulse: 77   Temp: 97.5 °F (36.4 °C)   TempSrc: Temporal   SpO2: 100%   Weight: 90.8 kg (200 lb 1.6 oz)   PainSc: 0-No pain     Current Status 7/14/2021   ECOG score 0       Physical Exam  Pulmonary:      Breath sounds: Normal breath sounds.   Neurological:      Mental Status: He is alert and oriented to person, place, and time.           RECENT LABS:  Glucose   Date Value Ref Range Status   12/02/2022 105 (H) 65 - 99 mg/dL Final     Sodium   Date Value Ref Range Status   12/02/2022 140 136 - 145 mmol/L Final     Potassium   Date Value Ref Range Status   12/02/2022 4.3 3.5 - 5.2 mmol/L Final     CO2   Date Value Ref Range Status   12/02/2022 25.0 22.0 - 29.0 mmol/L Final     Chloride   Date Value Ref Range Status   12/02/2022 105 98 - 107 mmol/L Final     Anion Gap   Date Value Ref Range Status   12/02/2022 10.0 5.0 - 15.0 mmol/L Final     Creatinine   Date Value Ref Range Status   12/02/2022 0.90 0.76 - 1.27 mg/dL Final     BUN   Date Value Ref Range Status   12/02/2022 11 8 - 23 mg/dL Final     BUN/Creatinine Ratio   Date Value Ref Range Status   12/02/2022 12.2 7.0 - 25.0 Final     Calcium   Date Value Ref Range Status   12/02/2022 9.5 8.6 - 10.5 mg/dL Final     eGFR Non  Amer   Date Value Ref Range Status   01/12/2022 92 >60 mL/min/1.73 Final     Alkaline Phosphatase   Date Value Ref Range Status   12/02/2022 61 39 - 117 U/L Final     Total Protein   Date Value Ref Range Status   12/02/2022 6.7 6.0 - 8.5 g/dL Final     ALT (SGPT)   Date Value Ref Range Status   12/02/2022 42 (H) 1 - 41 U/L Final     AST (SGOT)   Date Value Ref Range Status   12/02/2022 32 1 - 40 U/L Final     Total Bilirubin   Date Value Ref Range Status   12/02/2022 1.0 0.0 - 1.2 mg/dL Final     Albumin   Date Value Ref Range Status   12/02/2022 4.70 3.50 - 5.20 g/dL Final     Globulin   Date Value  Ref Range Status   12/02/2022 2.0 gm/dL Final     Lab Results   Component Value Date    WBC 4.58 01/13/2023    HGB 9.5 (L) 01/13/2023    HCT 28.2 (L) 01/13/2023    .1 (H) 01/13/2023     (H) 01/13/2023     Lab Results   Component Value Date    NEUTROABS 1.90 12/02/2022    IRON 109 05/28/2020    IRON 84 02/26/2020    TIBC 301 05/28/2020    TIBC 375 02/26/2020    LABIRON 36 05/28/2020    LABIRON 22 02/26/2020    FERRITIN 687.00 (H) 05/28/2020    FERRITIN 684 (H) 03/23/2020    FERRITIN 776.00 (H) 02/26/2020    [id number] 1,160 (H) 10/27/2021    [id number] >2,000 (H) 05/28/2020    FOLATE 11.70 05/28/2020     No results found for: , LABCA2, AFPTM, HCGQUANT, , CHROMGRNA, [id number], CEA, REFLABREPO      PATHOLOGY:  * Cannot find OR log *         RADIOLOGY DATA :  No radiology results for the last 7 days        Assessment & Plan     1.  MPN/MDS overlap syndrome  - Review oncology history for prior treatment details  - Patient is currently on hydroxyurea 1500 mg p.o. Monday and Friday.  Rest of the week patient is taking Hydrea 1000 mg p.o. daily.  - Patient has not been able to tolerate any higher dose of Hydrea secondary to worsening anemia and significant fatigue  -CBC done today shows white blood cell count is 4.58, hemoglobin is 9.5, platelet count is 588,000  -Recommend continue with same dose of hydroxyurea.  - Patient is also being evaluated and followed at [facility] for same.  - We will have patient return to clinic in 8 weeks with repeat CBC and CMP on that day.    2.  Anemia and thrombocytosis:  - Secondary to MPN/MDS overlap syndrome plus chemotherapy plus anti-  -Hemoglobin today 9.5, platelet count is 588,000  - We will monitor with CBC    3.  Vitamin D deficiency  - Patient is currently on vitamin D 2000 international unit p.o. daily  - We will recheck vitamin D level in March 2023    4.  Elevated liver function test:  -CMP from today shows liver function test has normalized.  Will  monitor with CMP    5.  Health maintenance: Patient does not smoke    6. Advance Care Planning: For now patient remains full code and is able to make decisions.  Patient has health care surrogate mentioned on chart.    7.  Prescriptions: Patient has enough prescription for hydroxyurea             PHQ-9 Total Score: 0   -Patient is not homicidal or suicidal.  No acute intervention required.    Marv Messina reports a pain score of 0.  Given his pain assessment as noted, treatment options were discussed and the following options were decided upon as a follow-up plan to address the patient's pain: continuation of current treatment plan for pain.         Vadim Song MD  1/13/2023  09:10 CST        Part of this note may be an electronic transcription/translation of spoken language to printed text using the Dragon Dictation System.          CC:           [Negative] : Heme/Lymph

## 2023-02-03 NOTE — PATIENT PROFILE ADULT - NSPROMUTINFOINDIVIDFT_GEN_A_NUR
BONE MARROW BIOPSY & ASPIRATION    POST PROCEDURE NOTE    Procedural physician:  JOSR Smith   Assistant:   None     Drains/Pack/Catheters: None     Estimated blood loss: None or negligible    Description of Procedure:  Patient was prepped and draped in sterile fashion. Local anesthetic injected. A disposable biopsy needle used to obtain bone marrow aspiration and trephine biopsies from Right posterior iliac crest.    Anesthetic: Local 20 mL of 1% Lidocaine    Findings/Complications: A sterile dressing was applied to the biopsy site. No complications.    Post-Op Diagnosis: Final pathology report to follow.    Specimens removed: Adequate specimen was obtained    Post-procedure evaluation: Tolerated procedure    Physician Plan of Care: Patient is to remain in supine position for one hour with an ice pack covering the affected area.  Nurse instructed to monitor patient's blood pressure and pulse and report any other untoward reactions for the next hour.    Disposition: Inpatient     None

## 2023-02-15 RX ORDER — ERGOCALCIFEROL 1.25 MG/1
50000 CAPSULE ORAL
Qty: 12 | Refills: 0 | Status: ACTIVE | COMMUNITY
Start: 2022-12-08 | End: 1900-01-01

## 2023-03-06 ENCOUNTER — RX RENEWAL (OUTPATIENT)
Age: 53
End: 2023-03-06

## 2023-03-29 ENCOUNTER — APPOINTMENT (OUTPATIENT)
Dept: FAMILY MEDICINE | Facility: CLINIC | Age: 53
End: 2023-03-29
Payer: MEDICARE

## 2023-03-29 VITALS
TEMPERATURE: 97.4 F | DIASTOLIC BLOOD PRESSURE: 76 MMHG | RESPIRATION RATE: 17 BRPM | BODY MASS INDEX: 30.96 KG/M2 | HEIGHT: 69 IN | SYSTOLIC BLOOD PRESSURE: 104 MMHG | WEIGHT: 209 LBS | OXYGEN SATURATION: 98 % | HEART RATE: 65 BPM

## 2023-03-29 DIAGNOSIS — E78.5 HYPERLIPIDEMIA, UNSPECIFIED: ICD-10-CM

## 2023-03-29 DIAGNOSIS — D64.9 ANEMIA, UNSPECIFIED: ICD-10-CM

## 2023-03-29 PROCEDURE — 96372 THER/PROPH/DIAG INJ SC/IM: CPT

## 2023-03-29 PROCEDURE — 99214 OFFICE O/P EST MOD 30 MIN: CPT | Mod: 25

## 2023-03-29 PROCEDURE — 36415 COLL VENOUS BLD VENIPUNCTURE: CPT

## 2023-03-29 RX ORDER — TERBINAFINE HYDROCHLORIDE 250 MG/1
250 TABLET ORAL DAILY
Qty: 90 | Refills: 0 | Status: DISCONTINUED | COMMUNITY
Start: 2022-06-27 | End: 2023-03-29

## 2023-03-29 RX ORDER — CYANOCOBALAMIN 1000 UG/ML
1000 INJECTION INTRAMUSCULAR; SUBCUTANEOUS
Qty: 0 | Refills: 0 | Status: COMPLETED | OUTPATIENT
Start: 2023-03-29

## 2023-03-29 RX ADMIN — CYANOCOBALAMIN 0 MCG/ML: 1000 INJECTION, SOLUTION INTRAMUSCULAR at 00:00

## 2023-03-31 ENCOUNTER — APPOINTMENT (OUTPATIENT)
Dept: UROLOGY | Facility: CLINIC | Age: 53
End: 2023-03-31
Payer: MEDICARE

## 2023-03-31 VITALS
BODY MASS INDEX: 30.21 KG/M2 | SYSTOLIC BLOOD PRESSURE: 149 MMHG | RESPIRATION RATE: 17 BRPM | DIASTOLIC BLOOD PRESSURE: 79 MMHG | HEART RATE: 67 BPM | HEIGHT: 69 IN | WEIGHT: 204 LBS

## 2023-03-31 DIAGNOSIS — N28.1 CYST OF KIDNEY, ACQUIRED: ICD-10-CM

## 2023-03-31 DIAGNOSIS — C64.9 MALIGNANT NEOPLASM OF UNSPECIFIED KIDNEY, EXCEPT RENAL PELVIS: ICD-10-CM

## 2023-03-31 PROCEDURE — 99213 OFFICE O/P EST LOW 20 MIN: CPT
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
## 2023-03-31 NOTE — ASSESSMENT
[FreeTextEntry1] : Almost 4y s/p L lap partial for papillary RCC. labs normal. \par --MRI and chest x-ray now\par --RTC in 1y for continued surveillance

## 2023-04-05 LAB
ALBUMIN SERPL ELPH-MCNC: 4.8 G/DL
ALP BLD-CCNC: 84 U/L
ALT SERPL-CCNC: 21 U/L
ANION GAP SERPL CALC-SCNC: 14 MMOL/L
AST SERPL-CCNC: 17 U/L
BASOPHILS # BLD AUTO: 0.04 K/UL
BASOPHILS NFR BLD AUTO: 0.6 %
BILIRUB SERPL-MCNC: 1.2 MG/DL
BUN SERPL-MCNC: 8 MG/DL
CALCIUM SERPL-MCNC: 10.1 MG/DL
CHLORIDE SERPL-SCNC: 105 MMOL/L
CHOLEST SERPL-MCNC: 181 MG/DL
CO2 SERPL-SCNC: 22 MMOL/L
CREAT SERPL-MCNC: 0.87 MG/DL
EGFR: 104 ML/MIN/1.73M2
EOSINOPHIL # BLD AUTO: 0.06 K/UL
EOSINOPHIL NFR BLD AUTO: 0.9 %
FERRITIN SERPL-MCNC: 89 NG/ML
FOLATE SERPL-MCNC: 8.7 NG/ML
GLUCOSE SERPL-MCNC: 104 MG/DL
HCT VFR BLD CALC: 46.5 %
HDLC SERPL-MCNC: 62 MG/DL
HGB BLD-MCNC: 14.6 G/DL
IMM GRANULOCYTES NFR BLD AUTO: 0.1 %
IRON SATN MFR SERPL: 24 %
IRON SERPL-MCNC: 78 UG/DL
LDLC SERPL CALC-MCNC: 104 MG/DL
LYMPHOCYTES # BLD AUTO: 1.48 K/UL
LYMPHOCYTES NFR BLD AUTO: 21.2 %
MAGNESIUM SERPL-MCNC: 2.2 MG/DL
MAN DIFF?: NORMAL
MCHC RBC-ENTMCNC: 29.3 PG
MCHC RBC-ENTMCNC: 31.4 GM/DL
MCV RBC AUTO: 93.2 FL
MONOCYTES # BLD AUTO: 0.6 K/UL
MONOCYTES NFR BLD AUTO: 8.6 %
NEUTROPHILS # BLD AUTO: 4.8 K/UL
NEUTROPHILS NFR BLD AUTO: 68.6 %
NONHDLC SERPL-MCNC: 118 MG/DL
PLATELET # BLD AUTO: 261 K/UL
POTASSIUM SERPL-SCNC: 5 MMOL/L
PROT SERPL-MCNC: 6.7 G/DL
RBC # BLD: 4.99 M/UL
RBC # FLD: 13.3 %
SODIUM SERPL-SCNC: 142 MMOL/L
T4 FREE SERPL-MCNC: 1.9 NG/DL
TESTOST FREE SERPL-MCNC: 13 PG/ML
TESTOST SERPL-MCNC: 668 NG/DL
TIBC SERPL-MCNC: 331 UG/DL
TRIGL SERPL-MCNC: 73 MG/DL
TSH SERPL-ACNC: 0.45 UIU/ML
UIBC SERPL-MCNC: 253 UG/DL
VIT B12 SERPL-MCNC: 430 PG/ML
WBC # FLD AUTO: 6.99 K/UL

## 2023-04-10 ENCOUNTER — APPOINTMENT (OUTPATIENT)
Dept: PAIN MANAGEMENT | Facility: CLINIC | Age: 53
End: 2023-04-10
Payer: MEDICARE

## 2023-04-10 ENCOUNTER — APPOINTMENT (OUTPATIENT)
Dept: FAMILY MEDICINE | Facility: CLINIC | Age: 53
End: 2023-04-10
Payer: MEDICARE

## 2023-04-10 VITALS
DIASTOLIC BLOOD PRESSURE: 78 MMHG | BODY MASS INDEX: 30.21 KG/M2 | HEART RATE: 65 BPM | HEIGHT: 69 IN | SYSTOLIC BLOOD PRESSURE: 145 MMHG | WEIGHT: 204 LBS

## 2023-04-10 PROCEDURE — 99214 OFFICE O/P EST MOD 30 MIN: CPT

## 2023-04-10 PROCEDURE — 96372 THER/PROPH/DIAG INJ SC/IM: CPT

## 2023-04-10 RX ADMIN — CYANOCOBALAMIN 0 MCG/ML: 1000 INJECTION, SOLUTION INTRAMUSCULAR; SUBCUTANEOUS at 00:00

## 2023-04-10 NOTE — HISTORY OF PRESENT ILLNESS
[FreeTextEntry1] : see HPI  [de-identified] : c/o fatigue. here for blood work and b12 shot. He has lost weight. BP low today. No new complaints. he is AAOX3 and is taking his meds. He will follow urology for f/u kidney cancer.
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
## 2023-04-10 NOTE — HEALTH RISK ASSESSMENT
[No] : In the past 12 months have you used drugs other than those required for medical reasons? No [No falls in past year] : Patient reported no falls in the past year [0] : 2) Feeling down, depressed, or hopeless: Not at all (0) [PHQ-2 Negative - No further assessment needed] : PHQ-2 Negative - No further assessment needed [With Patient/Caregiver] : , with patient/caregiver [Never] : Never [de-identified] : regular [[id number]] : 0 [AdvancecareDate] : 03/23

## 2023-04-11 RX ORDER — CYANOCOBALAMIN 1000 UG/ML
1000 INJECTION INTRAMUSCULAR; SUBCUTANEOUS
Qty: 0 | Refills: 0 | Status: COMPLETED | OUTPATIENT
Start: 2023-04-10

## 2023-04-12 NOTE — ASSESSMENT
[FreeTextEntry1] : 51 y/o M with chronic pain, chronic daily headaches resolved with the use of MM .  \par \par Recertification extended on this visit. \par Urine drug screen performed today\par fu in 6-12 months or sooner if needed. \par I-Stop reviewed,  reference #: [id number]\par \par Mr. RACHEL OHARA denies any history of psychosis, immediate family history of psychosis or arrhythmia. In my opinion He would benefit from medical marijuana. In regards to ratio, I believe He would benefit from a one-to-one plus a low THC: high CBD. He  has been advised of the potential risks and benefits of this agent. He has also been advised been advised not to drive up to four hours after taking medical marijuana.Patient has signed and fully understands Medical Cannabis Treatment Agreement our guidelines for prescription medication/cannabis and drug screening.  Medical marijuana agreement was reviewed and signed by patient. \par \par \par \par \par \par \par

## 2023-04-12 NOTE — HISTORY OF PRESENT ILLNESS
[FreeTextEntry1] : 53 y/o M with Pmhx HTN, hypothyroidism, asthma, anxiety, chronic back pain s/p multiple fusions as well as chronic migraines who presents today for follow up and MM recertification.  Since his last visit, reports feeling fatigue and PCP adjusting TSH and low B12 level.  He continues to reports he is taking medicinal cannabis 20:1 tincture which he is tolerating well . Since starting this he is no longer having Migraines.  \par \par Denies hallucination, dysrhythmia. \par \par Previous meds: Propranolol, Topamax\par Current meds include: Quetiapine 25 mg q hs for sleep. MM,  Xanax ( infrequently)

## 2023-04-13 ENCOUNTER — APPOINTMENT (OUTPATIENT)
Dept: MRI IMAGING | Facility: CLINIC | Age: 53
End: 2023-04-13
Payer: MEDICARE

## 2023-04-13 ENCOUNTER — APPOINTMENT (OUTPATIENT)
Dept: RADIOLOGY | Facility: CLINIC | Age: 53
End: 2023-04-13
Payer: MEDICARE

## 2023-04-13 ENCOUNTER — OUTPATIENT (OUTPATIENT)
Dept: OUTPATIENT SERVICES | Facility: HOSPITAL | Age: 53
LOS: 1 days | End: 2023-04-13
Payer: MEDICARE

## 2023-04-13 DIAGNOSIS — Z98.1 ARTHRODESIS STATUS: Chronic | ICD-10-CM

## 2023-04-13 DIAGNOSIS — M17.10 UNILATERAL PRIMARY OSTEOARTHRITIS, UNSPECIFIED KNEE: Chronic | ICD-10-CM

## 2023-04-13 DIAGNOSIS — C64.2 MALIGNANT NEOPLASM OF LEFT KIDNEY, EXCEPT RENAL PELVIS: ICD-10-CM

## 2023-04-13 DIAGNOSIS — Z00.8 ENCOUNTER FOR OTHER GENERAL EXAMINATION: ICD-10-CM

## 2023-04-13 DIAGNOSIS — R06.83 SNORING: Chronic | ICD-10-CM

## 2023-04-13 DIAGNOSIS — M12.9 ARTHROPATHY, UNSPECIFIED: Chronic | ICD-10-CM

## 2023-04-13 PROCEDURE — 74183 MRI ABD W/O CNTR FLWD CNTR: CPT

## 2023-04-13 PROCEDURE — 71046 X-RAY EXAM CHEST 2 VIEWS: CPT

## 2023-04-13 PROCEDURE — 71046 X-RAY EXAM CHEST 2 VIEWS: CPT | Mod: 26

## 2023-04-13 PROCEDURE — 74183 MRI ABD W/O CNTR FLWD CNTR: CPT | Mod: 26

## 2023-04-13 PROCEDURE — A9585: CPT

## 2023-04-17 ENCOUNTER — APPOINTMENT (OUTPATIENT)
Dept: FAMILY MEDICINE | Facility: CLINIC | Age: 53
End: 2023-04-17
Payer: MEDICARE

## 2023-04-17 PROCEDURE — 96372 THER/PROPH/DIAG INJ SC/IM: CPT

## 2023-04-17 RX ORDER — CYANOCOBALAMIN 1000 UG/ML
1000 INJECTION INTRAMUSCULAR; SUBCUTANEOUS
Qty: 0 | Refills: 0 | Status: COMPLETED | OUTPATIENT
Start: 2023-04-17

## 2023-04-17 RX ADMIN — CYANOCOBALAMIN 0 MCG/ML: 1000 INJECTION INTRAMUSCULAR; SUBCUTANEOUS at 00:00

## 2023-04-19 ENCOUNTER — NON-APPOINTMENT (OUTPATIENT)
Age: 53
End: 2023-04-19

## 2023-04-24 ENCOUNTER — APPOINTMENT (OUTPATIENT)
Dept: FAMILY MEDICINE | Facility: CLINIC | Age: 53
End: 2023-04-24
Payer: MEDICARE

## 2023-04-24 PROCEDURE — 96372 THER/PROPH/DIAG INJ SC/IM: CPT

## 2023-04-24 RX ORDER — CYANOCOBALAMIN 1000 UG/ML
1000 INJECTION INTRAMUSCULAR; SUBCUTANEOUS
Qty: 0 | Refills: 0 | Status: COMPLETED | OUTPATIENT
Start: 2023-04-24

## 2023-04-24 RX ADMIN — CYANOCOBALAMIN 0 MCG/ML: 1000 INJECTION INTRAMUSCULAR; SUBCUTANEOUS at 00:00

## 2023-05-01 ENCOUNTER — APPOINTMENT (OUTPATIENT)
Dept: FAMILY MEDICINE | Facility: CLINIC | Age: 53
End: 2023-05-01

## 2023-05-02 ENCOUNTER — APPOINTMENT (OUTPATIENT)
Dept: FAMILY MEDICINE | Facility: CLINIC | Age: 53
End: 2023-05-02
Payer: MEDICARE

## 2023-05-02 VITALS
BODY MASS INDEX: 30.21 KG/M2 | SYSTOLIC BLOOD PRESSURE: 108 MMHG | WEIGHT: 204 LBS | HEIGHT: 69 IN | OXYGEN SATURATION: 98 % | TEMPERATURE: 97.4 F | DIASTOLIC BLOOD PRESSURE: 72 MMHG | HEART RATE: 75 BPM | RESPIRATION RATE: 16 BRPM

## 2023-05-02 DIAGNOSIS — E53.8 DEFICIENCY OF OTHER SPECIFIED B GROUP VITAMINS: ICD-10-CM

## 2023-05-02 DIAGNOSIS — I99.8 OTHER DISORDER OF CIRCULATORY SYSTEM: ICD-10-CM

## 2023-05-02 PROCEDURE — 99215 OFFICE O/P EST HI 40 MIN: CPT

## 2023-05-03 ENCOUNTER — LABORATORY RESULT (OUTPATIENT)
Age: 53
End: 2023-05-03

## 2023-05-03 NOTE — HEALTH RISK ASSESSMENT
[Active tobbaco user] : Patient is not a tobacco user [No falls in past year] : Patient reported no falls in the past year [0] : Feeling down, depressed, or hopeless: Not at all [Never] : Never

## 2023-05-03 NOTE — PLAN
[FreeTextEntry1] : labs reviewed. additional morning fasting 8 am test ordered.\par  MVI rx sent to pharmacy.\par  f/u endocrine.

## 2023-05-03 NOTE — HISTORY OF PRESENT ILLNESS
[FreeTextEntry8] : fluctuating BP noted at home , fatigue, near fainting feeling sometimes not related to position. It started 3 months ago. s/p B12 IM X4 times in 1 month. c/o feeling"depleted". Mood is okay. He has seen urologist for f/u renal cancer  s/p partial nephrectomy and MR abd. done in 04/23 was negative for recurrence. He is requesting MVI rx and blood work. no BPR. He is due for colonoscopy next January. He is active outside in the yard, denies tick bite but he feels somnolent, has 6-8 hrs of uninterrupted sleep. His LevoT. dose was reduced to 88 mcg on 4/5/23.

## 2023-05-05 LAB
25(OH)D3 SERPL-MCNC: 42.9 NG/ML
ALDOSTERONE SERUM: 10.7 NG/DL
CMV IGG SERPL QL: <0.2 U/ML
CMV IGG SERPL-IMP: NEGATIVE
CORTIS SERPL-MCNC: 18.9 UG/DL
CRP SERPL-MCNC: <3 MG/L
DEPRECATED KAPPA LC FREE/LAMBDA SER: 1.17 RATIO
EBV EA AB SER IA-ACNC: <5 U/ML
EBV EA AB TITR SER IF: NEGATIVE
EBV EA IGG SER QL IA: <3 U/ML
EBV EA IGG SER-ACNC: NEGATIVE
EBV EA IGM SER IA-ACNC: NEGATIVE
EBV PATRN SPEC IB-IMP: NORMAL
EBV VCA IGG SER IA-ACNC: 16.7 U/ML
EBV VCA IGM SER QL IA: <10 U/ML
EPSTEIN-BARR VIRUS CAPSID ANTIGEN IGG: NEGATIVE
ERYTHROCYTE [SEDIMENTATION RATE] IN BLOOD BY WESTERGREN METHOD: 14 MM/HR
FOLATE SERPL-MCNC: 9.2 NG/ML
HAV IGM SER QL: NONREACTIVE
HBV CORE IGM SER QL: NONREACTIVE
HBV SURFACE AG SER QL: NONREACTIVE
HCV AB SER QL: NONREACTIVE
HCV S/CO RATIO: 0.12 S/CO
IGA SER QL IEP: 232 MG/DL
IGG SER QL IEP: 676 MG/DL
IGM SER QL IEP: 65 MG/DL
KAPPA LC CSF-MCNC: 1.35 MG/DL
KAPPA LC SERPL-MCNC: 1.58 MG/DL
LDH SERPL-CCNC: 190 U/L
NT-PROBNP SERPL-MCNC: 98 PG/ML
RENIN PLASMA: 8.5 PG/ML
T3FREE SERPL-MCNC: 3.14 PG/ML
T4 FREE SERPL-MCNC: 1.6 NG/DL
THYROGLOB AB SERPL-ACNC: <20 IU/ML
THYROPEROXIDASE AB SERPL IA-ACNC: <10 IU/ML
TSH SERPL-ACNC: 1.55 UIU/ML
VIT B12 SERPL-MCNC: 801 PG/ML

## 2023-05-10 ENCOUNTER — APPOINTMENT (OUTPATIENT)
Dept: ENDOCRINOLOGY | Facility: CLINIC | Age: 53
End: 2023-05-10
Payer: MEDICARE

## 2023-05-10 VITALS
OXYGEN SATURATION: 97 % | HEIGHT: 69 IN | HEART RATE: 62 BPM | WEIGHT: 204 LBS | SYSTOLIC BLOOD PRESSURE: 110 MMHG | BODY MASS INDEX: 30.21 KG/M2 | RESPIRATION RATE: 17 BRPM | DIASTOLIC BLOOD PRESSURE: 70 MMHG | TEMPERATURE: 98 F

## 2023-05-10 LAB
A PHAGOCYTOPH IGG TITR SER IF: NORMAL TITER
ALBUMIN MFR SERPL ELPH: 62.2 %
ALBUMIN SERPL-MCNC: 4.4 G/DL
ALBUMIN/GLOB SERPL: 1.7 RATIO
ALPHA1 GLOB MFR SERPL ELPH: 4.3 %
ALPHA1 GLOB SERPL ELPH-MCNC: 0.3 G/DL
ALPHA2 GLOB MFR SERPL ELPH: 9.8 %
ALPHA2 GLOB SERPL ELPH-MCNC: 0.7 G/DL
B BURGDOR AB SER QL IA: NEGATIVE
B MICROTI IGG TITR SER: NORMAL TITER
B-GLOBULIN MFR SERPL ELPH: 11.7 %
B-GLOBULIN SERPL ELPH-MCNC: 0.8 G/DL
DHEA-SULFATE, SERUM: 195 UG/DL
E CHAFFEENSIS IGG TITR SER IF: NORMAL TITER
GAMMA GLOB FLD ELPH-MCNC: 0.8 G/DL
GAMMA GLOB MFR SERPL ELPH: 12 %
HOMOCYSTEINE LEVEL: 11.8 UMOL/L
INTERPRETATION SERPL IEP-IMP: NORMAL
METANEPHRINE, PL: 18.2 PG/ML
METHYLMALONIC ACID LEVEL: 78 NMOL/L
NORMETANEPHRINE, PL: 21.5 PG/ML
PROT SERPL-MCNC: 7 G/DL
PROT SERPL-MCNC: 7 G/DL

## 2023-05-10 PROCEDURE — 99204 OFFICE O/P NEW MOD 45 MIN: CPT

## 2023-05-10 NOTE — PHYSICAL EXAM
[de-identified] : General: No distress, well nourished\par Eyes: Normal Sclera, EOMI, PERRL\par ENT: Normal appearance of the nose, normal oropharynx\par Neck/Thyroid: No cervical lymphadenopathy, thyroid gland 20 g in size, no thyroid nodules, non-tender\par Respiratory: No use of accessory muscles of respiration, vesicular breath sounds heard bilaterally, no crepitations or ronchi\par Cardiovascular: S1 and S2 heard and normal, no S3 or S4, no murmurs, radial pulse normal bilaterally\par Abdomen: soft, non-tender, no masses, normal bowel sounds\par Musculoskeletal: No swelling or deformities of joints of hands, no pedal edema\par Neurological: Normal range of motion in the hands, Normal brachioradialis reflexes bilaterally\par Psychiatry: Patient converses normally, good judgement and insight\par Skin: No rashes in hands, no nodules palpated in hands

## 2023-05-10 NOTE — ASSESSMENT
[FreeTextEntry1] : Target: TSH in the normal range for the testing lab \par \par Last TSH was at goal but patient has fatigue so I increased the dose of levothyroxine.\par \par I ordered a dexamethasone suppression test to evaluate for Cushing's syndrome.\par \par The patient does not have adrenal insufficiency as the serum cortisol on 05/03/2023 was > 15\par \par Last TSH - May 2023 - N\par \par Plan:\par 1. Increase levothyroxine (generic) to 100 micrograms po daily\par 2. Labs to done before next visit: Dexamethasone suppression test - patient to use dexamethasone 1 mg po at 11 pm to 12 midnight and come to the lab the next day at 8 am to 9 am to have serum cortisol level checked. \par 3. Follow up in 1 week to review results - telehealth visit ok. \par \par \par \par

## 2023-05-10 NOTE — HISTORY OF PRESENT ILLNESS
[FreeTextEntry1] : Problems:\par 1. Elevated cortisol level\par 2. Fatigue\par 3. Primary hypothyroidism \par \par Note - patient had renal cell carcinoma in the past.\par \par Primary hypothyroidism\par 1. Diagnosed in 2016 - patient never had thyroid surgery/radiation/radioactive iodine therapy\par 2. Mother had unknown thyroid disorder, no family history of thyroid cancer, no personal history of radiation therapy\par 3. Meds: \par Levothyroxine (generic) 88 micrograms po daily - fully compliant and patient advised on the appropriate use of levothyroxine. \par \par Elevated Cortisol level/Fatigue\par 1. Diagnosed in May 2023 - his serum cortisol level was 18.9 on 05/05/2023 - this was checked as he had fatigue. \par 2. Labs:\par 05/03/2023 - serum cortisol 18.9 so patient does not have adrenal insufficiency. \par 2. No family history of Cushing's syndrome\par 3. No personal history of DM, BMI on 05/10/2023 was 30.13 \par 4. Patient is not on exogenous glucocorticoids

## 2023-05-11 LAB — CORTIS SERPL-MCNC: 1.2 UG/DL

## 2023-05-15 LAB — T3REVERSE SERPL-MCNC: 22.4 NG/DL

## 2023-05-31 ENCOUNTER — RX RENEWAL (OUTPATIENT)
Age: 53
End: 2023-05-31

## 2023-05-31 NOTE — H&P PST ADULT - NSICDXPASTSURGICALHX_GEN_ALL_CORE_FT
85 y/o F w/ PMH of HTN, HLD, arthritis, osteoporosis and CAD presented to [facility] ED with complaint of high blood pressure associated with bilateral arm pain L>>R and dizziness    + LV dysfunction  + chest pain PAST SURGICAL HISTORY:  Arthropathy right shoulder surgery    Knee arthropathy left ACL in 2010    S/P Injection of Intervertebral Space for Herniated Disc 2004    S/P lumbar fusion double fusion 2010    Snoring UPPP in approximately 2016

## 2023-06-18 ENCOUNTER — RX RENEWAL (OUTPATIENT)
Age: 53
End: 2023-06-18

## 2023-06-29 ENCOUNTER — EMERGENCY (EMERGENCY)
Facility: HOSPITAL | Age: 53
LOS: 1 days | Discharge: HOME | End: 2023-06-29
Attending: EMERGENCY MEDICINE | Admitting: EMERGENCY MEDICINE
Payer: MEDICARE

## 2023-06-29 ENCOUNTER — APPOINTMENT (OUTPATIENT)
Dept: FAMILY MEDICINE | Facility: CLINIC | Age: 53
End: 2023-06-29

## 2023-06-29 VITALS
OXYGEN SATURATION: 99 % | RESPIRATION RATE: 16 BRPM | WEIGHT: 194.01 LBS | TEMPERATURE: 98 F | DIASTOLIC BLOOD PRESSURE: 82 MMHG | HEIGHT: 70 IN | HEART RATE: 63 BPM | SYSTOLIC BLOOD PRESSURE: 137 MMHG

## 2023-06-29 VITALS
SYSTOLIC BLOOD PRESSURE: 114 MMHG | TEMPERATURE: 98 F | DIASTOLIC BLOOD PRESSURE: 67 MMHG | RESPIRATION RATE: 14 BRPM | OXYGEN SATURATION: 99 % | HEART RATE: 54 BPM

## 2023-06-29 DIAGNOSIS — R06.83 SNORING: Chronic | ICD-10-CM

## 2023-06-29 DIAGNOSIS — Z98.1 ARTHRODESIS STATUS: Chronic | ICD-10-CM

## 2023-06-29 DIAGNOSIS — M12.9 ARTHROPATHY, UNSPECIFIED: Chronic | ICD-10-CM

## 2023-06-29 DIAGNOSIS — M17.10 UNILATERAL PRIMARY OSTEOARTHRITIS, UNSPECIFIED KNEE: Chronic | ICD-10-CM

## 2023-06-29 LAB
ALBUMIN SERPL ELPH-MCNC: 3.4 G/DL — SIGNIFICANT CHANGE UP (ref 3.3–5)
ALP SERPL-CCNC: 61 U/L — SIGNIFICANT CHANGE UP (ref 30–120)
ALT FLD-CCNC: 21 U/L DA — SIGNIFICANT CHANGE UP (ref 10–60)
ANION GAP SERPL CALC-SCNC: 9 MMOL/L — SIGNIFICANT CHANGE UP (ref 5–17)
APTT BLD: 23.7 SEC — LOW (ref 27.5–35.5)
AST SERPL-CCNC: 21 U/L — SIGNIFICANT CHANGE UP (ref 10–40)
BASOPHILS # BLD AUTO: 0.03 K/UL — SIGNIFICANT CHANGE UP (ref 0–0.2)
BASOPHILS NFR BLD AUTO: 0.3 % — SIGNIFICANT CHANGE UP (ref 0–2)
BILIRUB SERPL-MCNC: 0.9 MG/DL — SIGNIFICANT CHANGE UP (ref 0.2–1.2)
BUN SERPL-MCNC: 16 MG/DL — SIGNIFICANT CHANGE UP (ref 7–23)
CALCIUM SERPL-MCNC: 8.7 MG/DL — SIGNIFICANT CHANGE UP (ref 8.4–10.5)
CHLORIDE SERPL-SCNC: 102 MMOL/L — SIGNIFICANT CHANGE UP (ref 96–108)
CO2 SERPL-SCNC: 26 MMOL/L — SIGNIFICANT CHANGE UP (ref 22–31)
CREAT SERPL-MCNC: 0.83 MG/DL — SIGNIFICANT CHANGE UP (ref 0.5–1.3)
D DIMER BLD IA.RAPID-MCNC: 314 NG/ML DDU — HIGH
EGFR: 105 ML/MIN/1.73M2 — SIGNIFICANT CHANGE UP
EOSINOPHIL # BLD AUTO: 0.13 K/UL — SIGNIFICANT CHANGE UP (ref 0–0.5)
EOSINOPHIL NFR BLD AUTO: 1.4 % — SIGNIFICANT CHANGE UP (ref 0–6)
GLUCOSE SERPL-MCNC: 104 MG/DL — HIGH (ref 70–99)
HCT VFR BLD CALC: 44.6 % — SIGNIFICANT CHANGE UP (ref 39–50)
HGB BLD-MCNC: 14.9 G/DL — SIGNIFICANT CHANGE UP (ref 13–17)
IMM GRANULOCYTES NFR BLD AUTO: 0.2 % — SIGNIFICANT CHANGE UP (ref 0–0.9)
INR BLD: 0.97 RATIO — SIGNIFICANT CHANGE UP (ref 0.88–1.16)
LACTATE SERPL-SCNC: 0.6 MMOL/L — LOW (ref 0.7–2)
LIDOCAIN IGE QN: 88 U/L — SIGNIFICANT CHANGE UP (ref 73–393)
LYMPHOCYTES # BLD AUTO: 1.86 K/UL — SIGNIFICANT CHANGE UP (ref 1–3.3)
LYMPHOCYTES # BLD AUTO: 19.9 % — SIGNIFICANT CHANGE UP (ref 13–44)
MCHC RBC-ENTMCNC: 29.4 PG — SIGNIFICANT CHANGE UP (ref 27–34)
MCHC RBC-ENTMCNC: 33.4 GM/DL — SIGNIFICANT CHANGE UP (ref 32–36)
MCV RBC AUTO: 88.1 FL — SIGNIFICANT CHANGE UP (ref 80–100)
MONOCYTES # BLD AUTO: 1.53 K/UL — HIGH (ref 0–0.9)
MONOCYTES NFR BLD AUTO: 16.3 % — HIGH (ref 2–14)
NEUTROPHILS # BLD AUTO: 5.8 K/UL — SIGNIFICANT CHANGE UP (ref 1.8–7.4)
NEUTROPHILS NFR BLD AUTO: 61.9 % — SIGNIFICANT CHANGE UP (ref 43–77)
NRBC # BLD: 0 /100 WBCS — SIGNIFICANT CHANGE UP (ref 0–0)
PLATELET # BLD AUTO: 257 K/UL — SIGNIFICANT CHANGE UP (ref 150–400)
POTASSIUM SERPL-MCNC: 4.3 MMOL/L — SIGNIFICANT CHANGE UP (ref 3.5–5.3)
POTASSIUM SERPL-SCNC: 4.3 MMOL/L — SIGNIFICANT CHANGE UP (ref 3.5–5.3)
PROT SERPL-MCNC: 7.2 G/DL — SIGNIFICANT CHANGE UP (ref 6–8.3)
PROTHROM AB SERPL-ACNC: 11.5 SEC — SIGNIFICANT CHANGE UP (ref 10.5–13.4)
RBC # BLD: 5.06 M/UL — SIGNIFICANT CHANGE UP (ref 4.2–5.8)
RBC # FLD: 12.8 % — SIGNIFICANT CHANGE UP (ref 10.3–14.5)
SODIUM SERPL-SCNC: 137 MMOL/L — SIGNIFICANT CHANGE UP (ref 135–145)
TROPONIN I, HIGH SENSITIVITY RESULT: 7.6 NG/L — SIGNIFICANT CHANGE UP
WBC # BLD: 9.37 K/UL — SIGNIFICANT CHANGE UP (ref 3.8–10.5)
WBC # FLD AUTO: 9.37 K/UL — SIGNIFICANT CHANGE UP (ref 3.8–10.5)

## 2023-06-29 PROCEDURE — 96361 HYDRATE IV INFUSION ADD-ON: CPT

## 2023-06-29 PROCEDURE — 76705 ECHO EXAM OF ABDOMEN: CPT

## 2023-06-29 PROCEDURE — 80053 COMPREHEN METABOLIC PANEL: CPT

## 2023-06-29 PROCEDURE — 71045 X-RAY EXAM CHEST 1 VIEW: CPT | Mod: 26

## 2023-06-29 PROCEDURE — 93005 ELECTROCARDIOGRAM TRACING: CPT

## 2023-06-29 PROCEDURE — 99285 EMERGENCY DEPT VISIT HI MDM: CPT | Mod: 25

## 2023-06-29 PROCEDURE — 71045 X-RAY EXAM CHEST 1 VIEW: CPT

## 2023-06-29 PROCEDURE — 85379 FIBRIN DEGRADATION QUANT: CPT

## 2023-06-29 PROCEDURE — 71275 CT ANGIOGRAPHY CHEST: CPT | Mod: 26,MA

## 2023-06-29 PROCEDURE — 74176 CT ABD & PELVIS W/O CONTRAST: CPT | Mod: 26,MA

## 2023-06-29 PROCEDURE — 71275 CT ANGIOGRAPHY CHEST: CPT | Mod: MA

## 2023-06-29 PROCEDURE — 85730 THROMBOPLASTIN TIME PARTIAL: CPT

## 2023-06-29 PROCEDURE — 96374 THER/PROPH/DIAG INJ IV PUSH: CPT | Mod: XU

## 2023-06-29 PROCEDURE — 99285 EMERGENCY DEPT VISIT HI MDM: CPT

## 2023-06-29 PROCEDURE — 85610 PROTHROMBIN TIME: CPT

## 2023-06-29 PROCEDURE — 93010 ELECTROCARDIOGRAM REPORT: CPT

## 2023-06-29 PROCEDURE — 96375 TX/PRO/DX INJ NEW DRUG ADDON: CPT | Mod: XU

## 2023-06-29 PROCEDURE — 84484 ASSAY OF TROPONIN QUANT: CPT

## 2023-06-29 PROCEDURE — 85025 COMPLETE CBC W/AUTO DIFF WBC: CPT

## 2023-06-29 PROCEDURE — 83605 ASSAY OF LACTIC ACID: CPT

## 2023-06-29 PROCEDURE — 74176 CT ABD & PELVIS W/O CONTRAST: CPT | Mod: MA

## 2023-06-29 PROCEDURE — 83690 ASSAY OF LIPASE: CPT

## 2023-06-29 PROCEDURE — 36415 COLL VENOUS BLD VENIPUNCTURE: CPT

## 2023-06-29 PROCEDURE — 76705 ECHO EXAM OF ABDOMEN: CPT | Mod: 26

## 2023-06-29 RX ORDER — FAMOTIDINE 10 MG/ML
20 INJECTION INTRAVENOUS ONCE
Refills: 0 | Status: COMPLETED | OUTPATIENT
Start: 2023-06-29 | End: 2023-06-29

## 2023-06-29 RX ORDER — PANTOPRAZOLE SODIUM 20 MG/1
1 TABLET, DELAYED RELEASE ORAL
Qty: 30 | Refills: 0
Start: 2023-06-29 | End: 2023-07-28

## 2023-06-29 RX ORDER — PANTOPRAZOLE SODIUM 20 MG/1
40 TABLET, DELAYED RELEASE ORAL ONCE
Refills: 0 | Status: COMPLETED | OUTPATIENT
Start: 2023-06-29 | End: 2023-06-29

## 2023-06-29 RX ORDER — ONDANSETRON 8 MG/1
4 TABLET, FILM COATED ORAL ONCE
Refills: 0 | Status: COMPLETED | OUTPATIENT
Start: 2023-06-29 | End: 2023-06-29

## 2023-06-29 RX ORDER — SODIUM CHLORIDE 9 MG/ML
1000 INJECTION INTRAMUSCULAR; INTRAVENOUS; SUBCUTANEOUS ONCE
Refills: 0 | Status: COMPLETED | OUTPATIENT
Start: 2023-06-29 | End: 2023-06-29

## 2023-06-29 RX ORDER — MORPHINE SULFATE 50 MG/1
4 CAPSULE, EXTENDED RELEASE ORAL ONCE
Refills: 0 | Status: DISCONTINUED | OUTPATIENT
Start: 2023-06-29 | End: 2023-06-29

## 2023-06-29 RX ORDER — SUCRALFATE 1 G
1 TABLET ORAL ONCE
Refills: 0 | Status: COMPLETED | OUTPATIENT
Start: 2023-06-29 | End: 2023-06-29

## 2023-06-29 RX ADMIN — SODIUM CHLORIDE 1000 MILLILITER(S): 9 INJECTION INTRAMUSCULAR; INTRAVENOUS; SUBCUTANEOUS at 08:00

## 2023-06-29 RX ADMIN — SODIUM CHLORIDE 1000 MILLILITER(S): 9 INJECTION INTRAMUSCULAR; INTRAVENOUS; SUBCUTANEOUS at 09:00

## 2023-06-29 RX ADMIN — Medication 1 GRAM(S): at 06:54

## 2023-06-29 RX ADMIN — MORPHINE SULFATE 4 MILLIGRAM(S): 50 CAPSULE, EXTENDED RELEASE ORAL at 04:43

## 2023-06-29 RX ADMIN — FAMOTIDINE 20 MILLIGRAM(S): 10 INJECTION INTRAVENOUS at 06:57

## 2023-06-29 RX ADMIN — SODIUM CHLORIDE 1000 MILLILITER(S): 9 INJECTION INTRAMUSCULAR; INTRAVENOUS; SUBCUTANEOUS at 04:43

## 2023-06-29 RX ADMIN — ONDANSETRON 4 MILLIGRAM(S): 8 TABLET, FILM COATED ORAL at 04:43

## 2023-06-29 RX ADMIN — PANTOPRAZOLE SODIUM 40 MILLIGRAM(S): 20 TABLET, DELAYED RELEASE ORAL at 06:59

## 2023-06-29 NOTE — ED PROVIDER NOTE - PROGRESS NOTE DETAILS
Signout pending CT of abdomen and pelvis.  CT without acute finding, results discussed with pt. will discharge.

## 2023-06-29 NOTE — ED PROVIDER NOTE - PATIENT PORTAL LINK FT
You can access the FollowMyHealth Patient Portal offered by [facility] by registering at the following website: [email or web address]. By joining [employer]’s FollowMyHealth portal, you will also be able to view your health information using other applications (apps) compatible with our system.

## 2023-06-29 NOTE — CONSULT NOTE ADULT - SUBJECTIVE AND OBJECTIVE BOX
History of Present Illness: The patient is a 52 year old male with a history of HTN, hypothyroidism, asthma who presents with epigastric pain. Over the past 3 days, he had noted worsening upper abdominal and epigastric discomfort. There has been associated fevers and chills up to 102. He felt nauseous and short of breath at times. Pain worsened so he came to ED.    Past Medical/Surgical History:  HTN, hypothyroidism, asthma    Medications:  Home Medications:  levothyroxine 100 mcg (0.1 mg) oral capsule: 1 cap(s) orally once a day (22 Dec 2021 10:25)  metoprolol succinate 50 mg oral tablet, extended release: 1 tab(s) orally once a day (22 Dec 2021 10:25)  Xanax 1 mg oral tablet: orally 2 times a day, As Needed (22 Dec 2021 10:25)      Family History: Non-contributory family history of premature cardiovascular atherosclerotic disease    Social History: No tobacco, alcohol or drug use    Review of Systems:  General: No fevers, chills, weight gain  Skin: No rashes, color changes  Cardiovascular: +chest pain, orthopnea  Respiratory: +shortness of breath, cough  Gastrointestinal: +nausea, +abdominal pain  Genitourinary: No incontinence, pain with urination  Musculoskeletal: No pain, swelling, decreased range of motion  Neurological: No headache, weakness  Psychiatric: No depression, anxiety  Endocrine: No weight gain, increased thirst  All other systems are comprehensively negative.    Physical Exam:  Vitals:        Vital Signs Last 24 Hrs  T(C): 36.6 (29 Jun 2023 05:40), Max: 36.6 (29 Jun 2023 04:10)  T(F): 97.9 (29 Jun 2023 05:40), Max: 97.9 (29 Jun 2023 05:40)  HR: 54 (29 Jun 2023 05:40) (54 - 63)  BP: 103/60 (29 Jun 2023 05:40) (103/60 - 137/82)  BP(mean): --  RR: 17 (29 Jun 2023 05:40) (16 - 17)  SpO2: 97% (29 Jun 2023 05:40) (97% - 99%)  Parameters below as of 29 Jun 2023 05:40  Patient On (Oxygen Delivery Method): room air  General: NAD  HEENT: MMM  Neck: No JVD, no carotid bruit  Lungs: CTAB  CV: RRR, nl S1/S2, no M/R/G  Abdomen: S/NT/ND, +BS  Extremities: No LE edema, no cyanosis  Neuro: AAOx3, non-focal  Skin: No rash    Labs:                        14.9   9.37  )-----------( 257      ( 29 Jun 2023 04:45 )             44.6     06-29    137  |  102  |  16  ----------------------------<  104<H>  4.3   |  26  |  0.83    Ca    8.7      29 Jun 2023 04:45    TPro  7.2  /  Alb  3.4  /  TBili  0.9  /  DBili  x   /  AST  21  /  ALT  21  /  AlkPhos  61  06-29        PT/INR - ( 29 Jun 2023 04:45 )   PT: 11.5 sec;   INR: 0.97 ratio         PTT - ( 29 Jun 2023 04:45 )  PTT:23.7 sec    ECG/Telemetry: Sinus bradycardia, normal axis, no ST abnormality

## 2023-06-29 NOTE — ED ADULT NURSE NOTE - OBJECTIVE STATEMENT
c/o 10/10 mid epigastric pain N/V since sunday. worsening this AM. as per pt. " recently prescribed zofran for acid reflux"  pmhx HTN

## 2023-06-29 NOTE — ED PROVIDER NOTE - CARE PROVIDER_API CALL
Nikolas León  Gastroenterology  [address] [place] [address]  Phone: [phone]  Fax: [phone]  Follow Up Time: 1-3 Days

## 2023-06-29 NOTE — ED PROVIDER NOTE - OBJECTIVE STATEMENT
52 y.o. M c/o lower chest pain/epigastric pain, started 4d ago, reports temp of 102 and chills 4d ago, now 4d pain with nausea and vomiting, was trying to wait to see pcp later today, but yesterday pain worsened, went [facility], was advised possibly gastritis and given antacid, this morning continues to worsen, couldn't wait for pcp appt, hasn't taken anything for pain, no further fever, h/o partial nephrectomy left side for cancer about 4yr ago

## 2023-06-29 NOTE — CONSULT NOTE ADULT - ASSESSMENT
The patient is a 52 year old male with a history of HTN, hypothyroidism, asthma who presents with epigastric pain.     Plan:  - Symptoms concerning for GI or infectious process, less likely cardiac in origin  - ECG with no evidence of ischemia or infarction  - Given duration of symptoms, an acute MI is ruled out with one set of cardiac enzymes  - D-dimer mildly elevated; CTA chest pending  - GI/ID work-up as indicated by primary team  - No further cardiac work-up indicated at this time if CTA chest without significant pathology

## 2023-08-16 ENCOUNTER — RX RENEWAL (OUTPATIENT)
Age: 53
End: 2023-08-16

## 2023-09-28 NOTE — CURRENT MEDS
Post- Surgical Discharge Summary    Patient ID:  Denisse Nj  [id number]  female  66 y.o.  1944    Admit date: 9/26/2023    Discharge date: 09/28/23     Admitting Physician: Missy Castellanos MD     Date of Surgery:   9/26/2023     Preoperative Diagnosis:  Diverticulosis [K57.90]  Stricture of colon ([address]) [K56.699]    Postoperative Diagnosis:   * No post-op diagnosis entered *    Procedure(s):  ROBOTIC ASSISTED SIGMOID HEMICOLECTOMY WITH FLEXIBLE SIGMOIDOSCOPY,CYSTOSCOPY WITH INSERTION BILATERAL URETERALCATHETERS  (E R A S), . Anesthesia Type:   General     Surgeon: Missy Castellanos MD                            HPI:  Pt is a 66 y.o. female who has a history of Diverticulosis [K57.90]  Stricture of colon ([address]) [K56.699] who presents at this time for a sigmoid hemicolectomy . Problem List:   Patient Active Problem List   Diagnosis    Angioedema    CAD (coronary artery disease)    Hyperlipidemia    H/O allergy    Hypertension    IFG (impaired fasting glucose)    Encounter for preadmission testing    Diverticular stricture [facility])        Hospital Course: The patient underwent surgery. Intra-operative complications: None; patient tolerated the procedure well. Was taken to the PACU in stable condition and then transferred to the surgical floor. Pathology is pending. Surgeon will follow results as outpatient. Perioperative Antibiotics: Cefoxitin    Postoperative Pain Management:  Oxycodone     Postoperative transfusions:    Number of units banked PRBCs =   none     Post Op complications: None     Incisions  - clean, dry and intact. No significant erythema or swelling. Wound(s) appear to be healing without any evidence of infection. Patient mobilized with nursing and was found to be safe and steady with ambulation.      Discharged to: Home     Condition on Discharge: Stable     Discharge instructions:    - Take pain medications as prescribed  - Diet Low Fiber  - Discharge activity:    - [Takes medication as prescribed] : takes [None] : Patient does not have any barriers to medication adherence [No] : Did not review medication list for presence of high-risk medications.

## 2023-10-22 ENCOUNTER — EMERGENCY (EMERGENCY)
Facility: HOSPITAL | Age: 53
LOS: 1 days | Discharge: HOME | End: 2023-10-22
Attending: EMERGENCY MEDICINE | Admitting: EMERGENCY MEDICINE
Payer: MEDICARE

## 2023-10-22 VITALS
OXYGEN SATURATION: 97 % | SYSTOLIC BLOOD PRESSURE: 124 MMHG | HEART RATE: 85 BPM | WEIGHT: 200.62 LBS | RESPIRATION RATE: 15 BRPM | TEMPERATURE: 100 F | HEIGHT: 70 IN | DIASTOLIC BLOOD PRESSURE: 78 MMHG

## 2023-10-22 VITALS
RESPIRATION RATE: 15 BRPM | SYSTOLIC BLOOD PRESSURE: 121 MMHG | HEART RATE: 88 BPM | OXYGEN SATURATION: 97 % | DIASTOLIC BLOOD PRESSURE: 84 MMHG | TEMPERATURE: 100 F

## 2023-10-22 DIAGNOSIS — M12.9 ARTHROPATHY, UNSPECIFIED: Chronic | ICD-10-CM

## 2023-10-22 DIAGNOSIS — R06.83 SNORING: Chronic | ICD-10-CM

## 2023-10-22 DIAGNOSIS — Z98.1 ARTHRODESIS STATUS: Chronic | ICD-10-CM

## 2023-10-22 DIAGNOSIS — M17.10 UNILATERAL PRIMARY OSTEOARTHRITIS, UNSPECIFIED KNEE: Chronic | ICD-10-CM

## 2023-10-22 LAB
SARS-COV-2 RNA SPEC QL NAA+PROBE: DETECTED
SARS-COV-2 RNA SPEC QL NAA+PROBE: DETECTED

## 2023-10-22 PROCEDURE — 99283 EMERGENCY DEPT VISIT LOW MDM: CPT | Mod: 25

## 2023-10-22 PROCEDURE — 71046 X-RAY EXAM CHEST 2 VIEWS: CPT

## 2023-10-22 PROCEDURE — 99284 EMERGENCY DEPT VISIT MOD MDM: CPT

## 2023-10-22 PROCEDURE — 87635 SARS-COV-2 COVID-19 AMP PRB: CPT

## 2023-10-22 PROCEDURE — 71046 X-RAY EXAM CHEST 2 VIEWS: CPT | Mod: 26

## 2023-10-22 RX ORDER — NIRMATRELVIR AND RITONAVIR 150-100 MG
1 KIT ORAL
Qty: 1 | Refills: 0
Start: 2023-10-22

## 2023-10-22 RX ORDER — IBUPROFEN 200 MG
600 TABLET ORAL ONCE
Refills: 0 | Status: COMPLETED | OUTPATIENT
Start: 2023-10-22 | End: 2023-10-22

## 2023-10-22 RX ADMIN — Medication 600 MILLIGRAM(S): at 11:39

## 2023-10-22 NOTE — ED ADULT NURSE NOTE - OBJECTIVE STATEMENT
Pt came in ambulatory complains of cough , headache , on and off fever in the last few days now. Pt's wife reported tested (+) for Covid last week.

## 2023-10-22 NOTE — ED PROVIDER NOTE - OBJECTIVE STATEMENT
52 M hx asthma, hypothyroid, anxiety c/o fever, cough, myalgias for the past few days. Wife recently tested positive for covid. Pt taking tylenol, sudafed, using albuterol pump. Concerned about pneumonia.

## 2023-10-22 NOTE — ED PROVIDER NOTE - PATIENT PORTAL LINK FT
You can access the FollowMyHealth Patient Portal offered by [facility] by registering at the following website: [email or web address]. By joining [employer]’s FollowMyHealth portal, you will also be able to view your health information using other applications (apps) compatible with our system.

## 2023-10-22 NOTE — ED ADULT TRIAGE NOTE - ESI TRIAGE ACUITY LEVEL, MLM
4 Transposition Flap Text: The defect edges were debeveled with a #15 scalpel blade.  Given the location of the defect and the proximity to free margins a transposition flap was deemed most appropriate.  Using a sterile surgical marker, an appropriate transposition flap was drawn incorporating the defect.    The area thus outlined was incised deep to adipose tissue with a #15 scalpel blade.  The skin margins were undermined to an appropriate distance in all directions utilizing iris scissors.

## 2023-10-22 NOTE — ED PROVIDER NOTE - CLINICAL SUMMARY MEDICAL DECISION MAKING FREE TEXT BOX
52 M hx asthma, hypothyroid, anxiety c/o fever, cough, myalgias for the past few days. Wife recently tested positive for covid. Pt taking tylenol, sudafed, using albuterol pump. Concerned about pneumonia.    Physical exam vital signs stable afebrile no distress.  Pulse ox 99 on room air.  Lungs are clear heart S1-S2 regular rhythm abdomen soft nontender skin warm and dry no rash.  Impression is cough rule out COVID rule out pneumonia.  Plan is x-ray swab for COVID May need Paxlovid.

## 2023-10-22 NOTE — ED PROVIDER NOTE - CARE PROVIDERS DIRECT ADDRESSES
[email or web address]

## 2023-10-22 NOTE — ED PROVIDER NOTE - CARE PROVIDER_API CALL
Sheri Juarez  [address] [address]  Phone: [phone]  Fax: [phone]  Follow Up Time:

## 2023-11-20 ENCOUNTER — RX RENEWAL (OUTPATIENT)
Age: 53
End: 2023-11-20

## 2023-11-20 RX ORDER — MULTIVITAMIN WITH FOLIC ACID 400 MCG
TABLET ORAL
Qty: 30 | Refills: 5 | Status: ACTIVE | COMMUNITY
Start: 2023-11-20 | End: 1900-01-01

## 2023-12-06 ENCOUNTER — RX RENEWAL (OUTPATIENT)
Age: 53
End: 2023-12-06

## 2024-01-29 ENCOUNTER — APPOINTMENT (OUTPATIENT)
Dept: FAMILY MEDICINE | Facility: CLINIC | Age: 54
End: 2024-01-29
Payer: MEDICARE

## 2024-01-29 VITALS
SYSTOLIC BLOOD PRESSURE: 125 MMHG | HEART RATE: 54 BPM | BODY MASS INDEX: 30.36 KG/M2 | WEIGHT: 205 LBS | DIASTOLIC BLOOD PRESSURE: 81 MMHG | RESPIRATION RATE: 16 BRPM | HEIGHT: 69 IN | TEMPERATURE: 98.1 F | OXYGEN SATURATION: 98 %

## 2024-01-29 DIAGNOSIS — M62.830 MUSCLE SPASM OF BACK: ICD-10-CM

## 2024-01-29 DIAGNOSIS — M62.838 OTHER MUSCLE SPASM: ICD-10-CM

## 2024-01-29 DIAGNOSIS — R79.89 OTHER SPECIFIED ABNORMAL FINDINGS OF BLOOD CHEMISTRY: ICD-10-CM

## 2024-01-29 DIAGNOSIS — E55.9 VITAMIN D DEFICIENCY, UNSPECIFIED: ICD-10-CM

## 2024-01-29 PROCEDURE — 96372 THER/PROPH/DIAG INJ SC/IM: CPT

## 2024-01-29 PROCEDURE — 99215 OFFICE O/P EST HI 40 MIN: CPT | Mod: 25

## 2024-01-29 PROCEDURE — 36415 COLL VENOUS BLD VENIPUNCTURE: CPT

## 2024-01-29 RX ORDER — ALBUTEROL SULFATE 90 UG/1
108 (90 BASE) INHALANT RESPIRATORY (INHALATION) EVERY 6 HOURS
Qty: 1 | Refills: 5 | Status: ACTIVE | COMMUNITY
Start: 2021-09-21 | End: 1900-01-01

## 2024-01-29 RX ORDER — BUDESONIDE AND FORMOTEROL FUMARATE DIHYDRATE 80; 4.5 UG/1; UG/1
80-4.5 AEROSOL RESPIRATORY (INHALATION) TWICE DAILY
Qty: 1 | Refills: 0 | Status: ACTIVE | COMMUNITY
Start: 2024-01-29 | End: 1900-01-01

## 2024-01-29 RX ORDER — CYANOCOBALAMIN 1000 UG/ML
1000 INJECTION INTRAMUSCULAR; SUBCUTANEOUS
Qty: 0 | Refills: 0 | Status: COMPLETED | OUTPATIENT
Start: 2024-01-29

## 2024-01-29 RX ADMIN — CYANOCOBALAMIN 0 MCG/ML: 1000 INJECTION INTRAMUSCULAR; SUBCUTANEOUS at 00:00

## 2024-01-29 NOTE — PHYSICAL EXAM
[No Acute Distress] : no acute distress [Well Nourished] : well nourished [Well Developed] : well developed [Well-Appearing] : well-appearing [Normal Sclera/Conjunctiva] : normal sclera/conjunctiva [PERRL] : pupils equal round and reactive to light [EOMI] : extraocular movements intact [Normal Outer Ear/Nose] : the outer ears and nose were normal in appearance [Normal Oropharynx] : the oropharynx was normal [No JVD] : no jugular venous distention [No Lymphadenopathy] : no lymphadenopathy [Supple] : supple [Thyroid Normal, No Nodules] : the thyroid was normal and there were no nodules present [No Respiratory Distress] : no respiratory distress  [No Accessory Muscle Use] : no accessory muscle use [Clear to Auscultation] : lungs were clear to auscultation bilaterally [Normal Rate] : normal rate  [Regular Rhythm] : with a regular rhythm [Normal S1, S2] : normal S1 and S2 [No Murmur] : no murmur heard [No Carotid Bruits] : no carotid bruits [No Abdominal Bruit] : a ~M bruit was not heard ~T in the abdomen [No Varicosities] : no varicosities [Pedal Pulses Present] : the pedal pulses are present [No Edema] : there was no peripheral edema [No Palpable Aorta] : no palpable aorta [No Extremity Clubbing/Cyanosis] : no extremity clubbing/cyanosis [Soft] : abdomen soft [Non Tender] : non-tender [Non-distended] : non-distended [No Masses] : no abdominal mass palpated [No HSM] : no HSM [Normal Bowel Sounds] : normal bowel sounds [Normal Posterior Cervical Nodes] : no posterior cervical lymphadenopathy [Normal Anterior Cervical Nodes] : no anterior cervical lymphadenopathy [No CVA Tenderness] : no CVA  tenderness [No Spinal Tenderness] : no spinal tenderness [No Joint Swelling] : no joint swelling [Grossly Normal Strength/Tone] : grossly normal strength/tone [No Rash] : no rash [Coordination Grossly Intact] : coordination grossly intact [No Focal Deficits] : no focal deficits [Normal Gait] : normal gait [Deep Tendon Reflexes (DTR)] : deep tendon reflexes were 2+ and symmetric [Normal Affect] : the affect was normal [Normal Insight/Judgement] : insight and judgment were intact [de-identified] : neck discomfort

## 2024-01-29 NOTE — HEALTH RISK ASSESSMENT
[No] : In the past 12 months have you used drugs other than those required for medical reasons? No [No falls in past year] : Patient reported no falls in the past year [0] : 2) Feeling down, depressed, or hopeless: Not at all (0) [PHQ-2 Negative - No further assessment needed] : PHQ-2 Negative - No further assessment needed [de-identified] : exercise [de-identified] : plant based  [[id number]] : 0 [With Patient/Caregiver] : , with patient/caregiver [AdvancecareDate] : 01/24 [Never] : Never

## 2024-01-29 NOTE — HISTORY OF PRESENT ILLNESS
[FreeTextEntry1] : see HPI [de-identified] : he is on plant based diet.  he has nasal congestion . he feels fatigue has improved. he is due for labs. Her is requesting hydrotherapy referral. He has lost weight and feels spasms in neck shoulders and back. He is requesting inhaler refill for asthma/ chest tightness. He felt lightheaded last week in a parking lot due to fatigue. he is using inhaler once a day and frequently at night.Here with wife.

## 2024-02-02 ENCOUNTER — RX RENEWAL (OUTPATIENT)
Age: 54
End: 2024-02-02

## 2024-02-04 ENCOUNTER — EMERGENCY (EMERGENCY)
Facility: HOSPITAL | Age: 54
LOS: 1 days | Discharge: HOME | End: 2024-02-04
Attending: EMERGENCY MEDICINE | Admitting: EMERGENCY MEDICINE
Payer: MEDICARE

## 2024-02-04 VITALS
RESPIRATION RATE: 17 BRPM | TEMPERATURE: 98 F | WEIGHT: 197.09 LBS | HEART RATE: 53 BPM | SYSTOLIC BLOOD PRESSURE: 125 MMHG | OXYGEN SATURATION: 100 % | DIASTOLIC BLOOD PRESSURE: 71 MMHG | HEIGHT: 70 IN

## 2024-02-04 VITALS
OXYGEN SATURATION: 100 % | HEART RATE: 54 BPM | RESPIRATION RATE: 18 BRPM | SYSTOLIC BLOOD PRESSURE: 144 MMHG | DIASTOLIC BLOOD PRESSURE: 82 MMHG | TEMPERATURE: 98 F

## 2024-02-04 DIAGNOSIS — M17.10 UNILATERAL PRIMARY OSTEOARTHRITIS, UNSPECIFIED KNEE: Chronic | ICD-10-CM

## 2024-02-04 DIAGNOSIS — R06.83 SNORING: Chronic | ICD-10-CM

## 2024-02-04 DIAGNOSIS — Z98.1 ARTHRODESIS STATUS: Chronic | ICD-10-CM

## 2024-02-04 DIAGNOSIS — M12.9 ARTHROPATHY, UNSPECIFIED: Chronic | ICD-10-CM

## 2024-02-04 LAB
ALBUMIN SERPL ELPH-MCNC: 3.8 G/DL — SIGNIFICANT CHANGE UP (ref 3.3–5)
ALP SERPL-CCNC: 73 U/L — SIGNIFICANT CHANGE UP (ref 30–120)
ALT FLD-CCNC: 23 U/L — SIGNIFICANT CHANGE UP (ref 10–60)
ANION GAP SERPL CALC-SCNC: 5 MMOL/L — SIGNIFICANT CHANGE UP (ref 5–17)
APTT BLD: 26.1 SEC — SIGNIFICANT CHANGE UP (ref 24.5–35.6)
AST SERPL-CCNC: 17 U/L — SIGNIFICANT CHANGE UP (ref 10–40)
BASOPHILS # BLD AUTO: 0.05 K/UL — SIGNIFICANT CHANGE UP (ref 0–0.2)
BASOPHILS NFR BLD AUTO: 0.6 % — SIGNIFICANT CHANGE UP (ref 0–2)
BILIRUB SERPL-MCNC: 0.8 MG/DL — SIGNIFICANT CHANGE UP (ref 0.2–1.2)
BUN SERPL-MCNC: 10 MG/DL — SIGNIFICANT CHANGE UP (ref 7–23)
CALCIUM SERPL-MCNC: 9 MG/DL — SIGNIFICANT CHANGE UP (ref 8.4–10.5)
CHLORIDE SERPL-SCNC: 107 MMOL/L — SIGNIFICANT CHANGE UP (ref 96–108)
CO2 SERPL-SCNC: 25 MMOL/L — SIGNIFICANT CHANGE UP (ref 22–31)
CREAT SERPL-MCNC: 1 MG/DL — SIGNIFICANT CHANGE UP (ref 0.5–1.3)
D DIMER BLD IA.RAPID-MCNC: <150 NG/ML DDU — SIGNIFICANT CHANGE UP
EGFR: 90 ML/MIN/1.73M2 — SIGNIFICANT CHANGE UP
EOSINOPHIL # BLD AUTO: 0.08 K/UL — SIGNIFICANT CHANGE UP (ref 0–0.5)
EOSINOPHIL NFR BLD AUTO: 1 % — SIGNIFICANT CHANGE UP (ref 0–6)
GLUCOSE SERPL-MCNC: 108 MG/DL — HIGH (ref 70–99)
HCT VFR BLD CALC: 42.6 % — SIGNIFICANT CHANGE UP (ref 39–50)
HGB BLD-MCNC: 13.7 G/DL — SIGNIFICANT CHANGE UP (ref 13–17)
IMM GRANULOCYTES NFR BLD AUTO: 0.1 % — SIGNIFICANT CHANGE UP (ref 0–0.9)
INR BLD: 0.99 RATIO — SIGNIFICANT CHANGE UP (ref 0.85–1.18)
LYMPHOCYTES # BLD AUTO: 1.57 K/UL — SIGNIFICANT CHANGE UP (ref 1–3.3)
LYMPHOCYTES # BLD AUTO: 19.1 % — SIGNIFICANT CHANGE UP (ref 13–44)
MCHC RBC-ENTMCNC: 28.5 PG — SIGNIFICANT CHANGE UP (ref 27–34)
MCHC RBC-ENTMCNC: 32.2 GM/DL — SIGNIFICANT CHANGE UP (ref 32–36)
MCV RBC AUTO: 88.8 FL — SIGNIFICANT CHANGE UP (ref 80–100)
MONOCYTES # BLD AUTO: 0.76 K/UL — SIGNIFICANT CHANGE UP (ref 0–0.9)
MONOCYTES NFR BLD AUTO: 9.3 % — SIGNIFICANT CHANGE UP (ref 2–14)
NEUTROPHILS # BLD AUTO: 5.74 K/UL — SIGNIFICANT CHANGE UP (ref 1.8–7.4)
NEUTROPHILS NFR BLD AUTO: 69.9 % — SIGNIFICANT CHANGE UP (ref 43–77)
NRBC # BLD: 0 /100 WBCS — SIGNIFICANT CHANGE UP (ref 0–0)
PLATELET # BLD AUTO: 235 K/UL — SIGNIFICANT CHANGE UP (ref 150–400)
POTASSIUM SERPL-MCNC: 5 MMOL/L — SIGNIFICANT CHANGE UP (ref 3.5–5.3)
POTASSIUM SERPL-SCNC: 5 MMOL/L — SIGNIFICANT CHANGE UP (ref 3.5–5.3)
PROT SERPL-MCNC: 7.2 G/DL — SIGNIFICANT CHANGE UP (ref 6–8.3)
PROTHROM AB SERPL-ACNC: 11.1 SEC — SIGNIFICANT CHANGE UP (ref 9.5–13)
RBC # BLD: 4.8 M/UL — SIGNIFICANT CHANGE UP (ref 4.2–5.8)
RBC # FLD: 12.3 % — SIGNIFICANT CHANGE UP (ref 10.3–14.5)
SODIUM SERPL-SCNC: 137 MMOL/L — SIGNIFICANT CHANGE UP (ref 135–145)
TROPONIN I, HIGH SENSITIVITY RESULT: 6.6 NG/L — SIGNIFICANT CHANGE UP
WBC # BLD: 8.21 K/UL — SIGNIFICANT CHANGE UP (ref 3.8–10.5)
WBC # FLD AUTO: 8.21 K/UL — SIGNIFICANT CHANGE UP (ref 3.8–10.5)

## 2024-02-04 PROCEDURE — 96372 THER/PROPH/DIAG INJ SC/IM: CPT

## 2024-02-04 PROCEDURE — 84484 ASSAY OF TROPONIN QUANT: CPT

## 2024-02-04 PROCEDURE — 85025 COMPLETE CBC W/AUTO DIFF WBC: CPT

## 2024-02-04 PROCEDURE — 85379 FIBRIN DEGRADATION QUANT: CPT

## 2024-02-04 PROCEDURE — 99285 EMERGENCY DEPT VISIT HI MDM: CPT

## 2024-02-04 PROCEDURE — 85730 THROMBOPLASTIN TIME PARTIAL: CPT

## 2024-02-04 PROCEDURE — 36415 COLL VENOUS BLD VENIPUNCTURE: CPT

## 2024-02-04 PROCEDURE — 80053 COMPREHEN METABOLIC PANEL: CPT

## 2024-02-04 PROCEDURE — 72125 CT NECK SPINE W/O DYE: CPT | Mod: MA

## 2024-02-04 PROCEDURE — 71045 X-RAY EXAM CHEST 1 VIEW: CPT | Mod: 26

## 2024-02-04 PROCEDURE — 93010 ELECTROCARDIOGRAM REPORT: CPT

## 2024-02-04 PROCEDURE — 72125 CT NECK SPINE W/O DYE: CPT | Mod: 26,MA

## 2024-02-04 PROCEDURE — 85610 PROTHROMBIN TIME: CPT

## 2024-02-04 PROCEDURE — 99285 EMERGENCY DEPT VISIT HI MDM: CPT | Mod: 25

## 2024-02-04 PROCEDURE — 93005 ELECTROCARDIOGRAM TRACING: CPT

## 2024-02-04 PROCEDURE — 71045 X-RAY EXAM CHEST 1 VIEW: CPT

## 2024-02-04 RX ORDER — LIDOCAINE 4 G/100G
1 CREAM TOPICAL ONCE
Refills: 0 | Status: COMPLETED | OUTPATIENT
Start: 2024-02-04 | End: 2024-02-04

## 2024-02-04 RX ORDER — DIAZEPAM 5 MG
1 TABLET ORAL
Qty: 8 | Refills: 0
Start: 2024-02-04 | End: 2024-02-07

## 2024-02-04 RX ORDER — DIAZEPAM 5 MG
5 TABLET ORAL ONCE
Refills: 0 | Status: DISCONTINUED | OUTPATIENT
Start: 2024-02-04 | End: 2024-02-04

## 2024-02-04 RX ORDER — KETOROLAC TROMETHAMINE 30 MG/ML
30 SYRINGE (ML) INJECTION ONCE
Refills: 0 | Status: DISCONTINUED | OUTPATIENT
Start: 2024-02-04 | End: 2024-02-04

## 2024-02-04 RX ADMIN — LIDOCAINE 1 PATCH: 4 CREAM TOPICAL at 11:33

## 2024-02-04 RX ADMIN — Medication 5 MILLIGRAM(S): at 11:32

## 2024-02-04 RX ADMIN — Medication 30 MILLIGRAM(S): at 13:08

## 2024-02-04 RX ADMIN — Medication 30 MILLIGRAM(S): at 13:34

## 2024-02-04 NOTE — ED ADULT NURSE REASSESSMENT NOTE - NS ED NURSE REASSESS COMMENT FT1
patient A&Ox3 in no acute distress discharge as orders wife come to pick him up , left ER self ambulated no heplock

## 2024-02-04 NOTE — ED PROVIDER NOTE - NSFOLLOWUPINSTRUCTIONS_ED_ALL_ED_FT
1) Follow-up with Orthopedics, See referred doctor. Call today/next business day for close, prompt follow-up.  2) Return to Emergency room for any worsening or persistent pain, weakness, numbness, fever, color change to extremity, or any other concerning symptoms.  3) Take Tylenol as needed for pain   4) You can consider discussing with your doctor if physical therapy or further imaging as an MRI may be beneficial.   5) Be cautious when taking Valium as it may cause drowsiness. Do not drive or operate machinery when taking percocet. Valium is a narcotic. Narcotics have addictive properties, be cautious when taking this medication. Avoid taking if history of alcohol/drug abuse.       Acute Neck Pain    WHAT YOU NEED TO KNOW:    Acute neck pain starts suddenly, increases quickly, and goes away in a few days. The pain may come and go, or be worse with certain movements. The pain may be only in your neck, or it may move to your arms, back, or shoulders. You may also have pain that starts in another body area and moves to your neck.  Vertebral Column    DISCHARGE INSTRUCTIONS:    Return to the emergency department if:    You have an injury that causes neck pain and shooting pain down your arms or legs.    Your neck pain suddenly becomes severe.    You have neck pain along with numbness, tingling, or weakness in your arms or legs.    You have a stiff neck, a headache, and a fever.  Call your doctor if:    You have new or worsening symptoms.    Your symptoms continue even after treatment.    You have questions or concerns about your condition or care.  Medicines: You may need any of the following:    NSAIDs, such as ibuprofen, help decrease swelling, pain, and fever. This medicine is available with or without a doctor's order. NSAIDs can cause stomach bleeding or kidney problems in certain people. If you take blood thinner medicine, always ask your healthcare provider if NSAIDs are safe for you. Always read the medicine label and follow directions.    Acetaminophen decreases pain and fever. It is available without a doctor's order. Ask how much to take and how often to take it. Follow directions. Read the labels of all other medicines you are using to see if they also contain acetaminophen, or ask your doctor or pharmacist. Acetaminophen can cause liver damage if not taken correctly.    Steroid medicine may be used to reduce inflammation. This can help relieve pain caused by swelling.    Muscle relaxers help relax tense muscles and can prevent muscle spasms.    Nerve medicine may be given if your pain is caused by a nerve problem.    Take your medicine as directed. Contact your healthcare provider if you think your medicine is not helping or if you have side effects. Tell your provider if you are allergic to any medicine. Keep a list of the medicines, vitamins, and herbs you take. Include the amounts, and when and why you take them. Bring the list or the pill bottles to follow-up visits. Carry your medicine list with you in case of an emergency.  Manage or prevent acute neck pain:    Rest your neck as directed. Do not make sudden movements, such as turning your head quickly. Your healthcare provider may recommend you wear a cervical collar for a short time. The collar will prevent you from moving your head. This will give your neck time to heal if an injury is causing your neck pain. Ask your healthcare provider when you can return to sports or other normal daily activities.  Cervical Collars      Apply heat as directed. Heat helps relieve pain and swelling. Use a heat wrap, or soak a small towel in warm water. Wring out the extra water. Apply the heat wrap or towel for 20 minutes every hour, or as directed.    Apply ice as directed. Ice helps relieve pain and swelling, and can help prevent tissue damage. Use an ice pack, or put ice in a bag. Cover the ice pack or back with a towel before you apply it to your neck. Apply the ice pack or ice for 15 minutes every hour, or as directed. Your healthcare provider can tell you how often to apply ice.    Do neck exercises as directed. Neck exercises help strengthen the muscles and increase range of motion. Your healthcare provider will tell you which exercises are right for you. He or she may give you instructions or recommend that you work with a physical therapist. Your healthcare provider or therapist can make sure you are doing the exercises correctly.    Maintain good posture. Try to keep your head and shoulders lifted when you sit. If you work in front of a computer, make sure the monitor is at the right level. You should not need to look up down to see the screen. You should also not have to lean forward to be able to read what is on the screen. Make sure your keyboard, mouse, and other computer items are placed where you do not have to extend your shoulder to reach them. Get up often if you work in front of a computer or sit for long periods of time. Stretch or walk around to keep your neck muscles loose.  Proper Ergonomics  Follow up with your doctor as directed: He or she may refer you to a specialist if your pain does not get better with treatment. Write down your questions so you remember to ask them during your visits

## 2024-02-04 NOTE — ED PROVIDER NOTE - CLINICAL SUMMARY MEDICAL DECISION MAKING FREE TEXT BOX
Independent review of EKG reveals a sinus bradycardia at 51 bpm Patient is a 53-year-old male who presents to the emergency room chief complaint of neck pain.  Past medical history of hypertension history of renal neoplasm with partial nephrectomy hypothyroidism.  Coming in today with right-sided neck pain rating down into his upper right anterior chest.  Reports that the pain has been present for the last 2 weeks seems to be somewhat progressively worsened.  Patient did follow-up with his primary care doctor and subsequently a pulmonologist because he was experiencing some shortness of breath due to the pain.  He is currently taking albuterol and Symbicort.  Then followed up with a chiropractor and has had 2 adjustments.  He said minimal relief.  States the pain is now radiating to his right upper chest worsened with movement.  Took Advil yesterday with little to no improvement.  Denies any headache visual changes fevers chills nausea vomiting abdominal pain extremity numbness.  On exam patient sitting up in bed mildly anxious no acute distress normocephalic atraumatic pupils are equal round and reactive heart is regular rate lungs are clear to auscultation abdomen soft nontender nondistended.  No skin rashes noted to the chest.  Patient with diffuse tenderness to palpation to the neck right greater than left most notable overlying the trapezius region spasm noted not significant.  Full range of motion of bilateral upper extremities neurovascular intact.  Patient presenting to the emergency room with worsening right-sided neck pain.  Differential includes possible disc herniation versus spasm.  Given that pain radiates somewhat to the chest with shortness of breath must also consider PE.  Will obtain screening labs check D-dimer and cardiac enzymes EKG chest x-ray CT imaging of the cervical spine and monitor.  Independent review of EKG reveals a sinus bradycardia 51 bpm.  Independent review of chest x-ray reveals no acute infiltrate.  CT imaging reveals degenerative changes.  Patient advised that he will need to follow-up with orthopedics for further workup and management and possible MRI.  Strict return precautions reviewed Patient is a 53-year-old male who presents to the emergency room chief complaint of neck pain.  Past medical history of hypertension history of renal neoplasm with partial nephrectomy hypothyroidism.  Coming in today with right-sided neck pain rating down into his upper right anterior chest.  Reports that the pain has been present for the last 2 weeks seems to be somewhat progressively worsened.  Patient did follow-up with his primary care doctor and subsequently a pulmonologist because he was experiencing some shortness of breath due to the pain.  He is currently taking albuterol and Symbicort.  Then followed up with a chiropractor and has had 2 adjustments.  He said minimal relief.  States the pain is now radiating to his right upper chest worsened with movement.  Took Advil yesterday with little to no improvement.  Denies any headache visual changes fevers chills nausea vomiting abdominal pain extremity numbness.  On exam patient sitting up in bed mildly anxious no acute distress normocephalic atraumatic pupils are equal round and reactive heart is regular bradycardic lungs are clear to auscultation abdomen soft nontender nondistended.  No skin rashes noted to the chest.  Patient with diffuse tenderness to palpation to the neck right greater than left most notable overlying the trapezius region spasm noted not significant.  Full range of motion of bilateral upper extremities neurovascular intact.  Patient presenting to the emergency room with worsening right-sided neck pain.  Differential includes possible disc herniation versus spasm.  Given that pain radiates somewhat to the chest with shortness of breath must also consider PE.  Will obtain screening labs check D-dimer and cardiac enzymes EKG chest x-ray CT imaging of the cervical spine and monitor.  Independent review of EKG reveals a sinus bradycardia 51 bpm.  Independent review of chest x-ray reveals no acute infiltrate.  CT imaging reveals degenerative changes.  Patient advised that he will need to follow-up with orthopedics for further workup and management and possible MRI.  Strict return precautions reviewed

## 2024-02-04 NOTE — ED ADULT TRIAGE NOTE - CHIEF COMPLAINT QUOTE
54 y/o male presents aox4 ambulatory c/o chronic right posterior neck pain that has been becoming increasingly worse over the past couple of days and is now radiating downwards to right upper chest.

## 2024-02-04 NOTE — ED PROVIDER NOTE - DIFFERENTIAL DIAGNOSIS
Patient presenting to the emergency room with worsening right-sided neck pain.  Differential includes possible disc herniation versus spasm.  Given that pain radiates somewhat to the chest with shortness of breath must also consider PE.  Will obtain screening labs check D-dimer and cardiac enzymes EKG chest x-ray CT imaging of the cervical spine and monitor. Differential Diagnosis

## 2024-02-04 NOTE — ED ADULT NURSE NOTE - CHIEF COMPLAINT QUOTE
52 y/o male presents aox4 ambulatory c/o chronic right posterior neck pain that has been becoming increasingly worse over the past couple of days and is now radiating downwards to right upper chest.

## 2024-02-04 NOTE — ED PROVIDER NOTE - PATIENT PORTAL LINK FT
You can access the FollowMyHealth Patient Portal offered by [facility] by registering at the following website: [email or web address]. By joining [facility]’s FollowMyHealth portal, you will also be able to view your health information using other applications (apps) compatible with our system.

## 2024-02-04 NOTE — ED ADULT NURSE NOTE - OBJECTIVE STATEMENT
patient A&Ox3 in no acute distress c/o of r side neck pain radiating into the chest and R arm with difficulty breathing started 2 weeks ago getting worst 2 days ago , patient went to chiropractice stated " after that it got worst" place patient on cardiac monitor HR 50 Md aware no other orders at this time

## 2024-02-04 NOTE — ED PROVIDER NOTE - WHICH SHOWED
Independent review of EKG reveals a sinus bradycardia 51 bpm.  Independent review of chest x-ray reveals no acute infiltrate.  CT imaging reveals degenerative changes.

## 2024-02-04 NOTE — ED PROVIDER NOTE - PHYSICAL EXAMINATION
Constitutional: Awake, Alert, non-toxic. NAD  HEAD: Normocephalic, atraumatic.   EYES: EOM intact, conjunctiva and sclera are clear bilaterally. No raccoon eyes.   ENT: No rhinorrhea, normal pharynx, patent, no tonsillar exudate or enlargement, mucous membranes pink/moist, no erythema, no drooling or stridor.   NECK: Supple, non-tender  BACK: No midline or paraspinal TTP of cervical/thoracic/lumbar spine, FROM. No ecchymosis or hematomas.   CARDIOVASCULAR: Normal S1, S2; regular rate and rhythm.  RESPIRATORY: Normal respiratory effort; breath sounds CTAB, no wheezes, rhonchi, or rales. Speaking in full sentences. No accessory muscle use.   ABDOMEN: Soft; non-tender, non-distended  EXTREMITIES: Full passive and active ROM in all extremities; non-tender to palpation; distal pulses palpable and symmetric, no edema, no crepitus or step off  SKIN: Warm, dry; good skin turgor, no apparent lesions or rashes, no ecchymosis, brisk capillary refill.  NEURO: A&O x3. Sensory and motor functions are grossly intact. Speech is normal. Appearance and judgement seem appropriate for gender and age. No neurological deficits. Neurovascular sensation intact motor function 5/5 of upper and lower extremities, CN II-XII grossly intact, no ataxia, absent romberg and pronator drift, intact cerebellar function. Speech clear, without articulation or word-finding difficulties. Eyes- PERRL bilaterally. EOMs in tact. No nystagmus. No facial droop.

## 2024-02-04 NOTE — ED ADULT NURSE NOTE - NSFALLUNIVINTERV_ED_ALL_ED
Bed/Stretcher in lowest position, wheels locked, appropriate side rails in place/Call bell, personal items and telephone in reach/Instruct patient to call for assistance before getting out of bed/chair/stretcher/Non-slip footwear applied when patient is off stretcher/[place] to call system/Physically safe environment - no spills, clutter or unnecessary equipment/Purposeful proactive rounding/Room/bathroom lighting operational, light cord in reach

## 2024-02-04 NOTE — ED PROVIDER NOTE - OBJECTIVE STATEMENT
53-year-old male with past medical history of hypertension, kidney neoplasm with partial nephrectomy, and hypothyroidism presents today due to right-sided neck pain.  Patient reports that he had the neck pain for 2 weeks in which he has followed up with his chiropractor.  Patient reports that he is also followed up with her as primary care doctor and a pulmonologist due to shortness of breath.  Patient is currently taking albuterol and Symbicort.  Patient reports that the pain has recently been radiating to his right upper chest, worsened with movement, and currently 8 out of 10.  Patient took Advil yesterday.  Patient reports he has a history of chronic back pain and lumbar fusions.  Patient denies headache, vertigo, fever, hemoptysis, palpitations, numbness, weakness, or any other complaints.

## 2024-02-04 NOTE — ED PROVIDER NOTE - CARE PROVIDER_API CALL
Cory Huff  Orthopaedic Surgery  [address], [address]  [place] [address]  Phone: [phone]  Fax: [phone]  Follow Up Time: 1-3 Days

## 2024-02-06 ENCOUNTER — NON-APPOINTMENT (OUTPATIENT)
Age: 54
End: 2024-02-06

## 2024-02-06 ENCOUNTER — APPOINTMENT (OUTPATIENT)
Dept: ORTHOPEDIC SURGERY | Facility: CLINIC | Age: 54
End: 2024-02-06
Payer: MEDICARE

## 2024-02-06 VITALS
HEIGHT: 69 IN | HEART RATE: 72 BPM | DIASTOLIC BLOOD PRESSURE: 69 MMHG | BODY MASS INDEX: 29.18 KG/M2 | SYSTOLIC BLOOD PRESSURE: 104 MMHG | WEIGHT: 197 LBS

## 2024-02-06 LAB
25(OH)D3 SERPL-MCNC: 41.7 NG/ML
ALBUMIN SERPL ELPH-MCNC: 4.6 G/DL
ALP BLD-CCNC: 86 U/L
ALT SERPL-CCNC: 18 U/L
ANION GAP SERPL CALC-SCNC: 13 MMOL/L
APPEARANCE: CLEAR
AST SERPL-CCNC: 17 U/L
BILIRUB SERPL-MCNC: 1.4 MG/DL
BILIRUBIN URINE: NEGATIVE
BLOOD URINE: NEGATIVE
BUN SERPL-MCNC: 7 MG/DL
CALCIUM SERPL-MCNC: 9.6 MG/DL
CHLORIDE SERPL-SCNC: 103 MMOL/L
CHOLEST SERPL-MCNC: 156 MG/DL
CO2 SERPL-SCNC: 24 MMOL/L
COLOR: YELLOW
CREAT SERPL-MCNC: 0.9 MG/DL
CRP SERPL HS-MCNC: 2.83 MG/L
EGFR: 102 ML/MIN/1.73M2
FOLATE SERPL-MCNC: >20 NG/ML
GLUCOSE QUALITATIVE U: NEGATIVE MG/DL
GLUCOSE SERPL-MCNC: 97 MG/DL
HDLC SERPL-MCNC: 62 MG/DL
HOMOCYSTEINE LEVEL: 10.3 UMOL/L
KETONES URINE: NEGATIVE MG/DL
LDLC SERPL CALC-MCNC: 80 MG/DL
LEUKOCYTE ESTERASE URINE: NEGATIVE
METHYLMALONATE SERPL-SCNC: 170 NMOL/L
NITRITE URINE: NEGATIVE
NONHDLC SERPL-MCNC: 94 MG/DL
PH URINE: 6
POTASSIUM SERPL-SCNC: 4.8 MMOL/L
PROT SERPL-MCNC: 6.9 G/DL
PROTEIN URINE: NEGATIVE MG/DL
PSA SERPL-MCNC: 1.67 NG/ML
SODIUM SERPL-SCNC: 141 MMOL/L
SPECIFIC GRAVITY URINE: 1.02
T3FREE SERPL-MCNC: 2.97 PG/ML
T3REVERSE SERPL-MCNC: 24.9 NG/DL
T4 FREE SERPL-MCNC: 1.8 NG/DL
TRIGL SERPL-MCNC: 72 MG/DL
TSH SERPL-ACNC: 1 UIU/ML
URATE SERPL-MCNC: 6.6 MG/DL
UROBILINOGEN URINE: 0.2 MG/DL
VIT B12 SERPL-MCNC: >2000 PG/ML

## 2024-02-06 PROCEDURE — 72050 X-RAY EXAM NECK SPINE 4/5VWS: CPT

## 2024-02-06 PROCEDURE — 99204 OFFICE O/P NEW MOD 45 MIN: CPT

## 2024-02-06 RX ORDER — LIDOCAINE 5% 700 MG/1
5 PATCH TOPICAL
Qty: 30 | Refills: 1 | Status: ACTIVE | COMMUNITY
Start: 2024-02-06 | End: 1900-01-01

## 2024-02-07 ENCOUNTER — APPOINTMENT (OUTPATIENT)
Dept: MRI IMAGING | Facility: CLINIC | Age: 54
End: 2024-02-07
Payer: MEDICARE

## 2024-02-07 PROCEDURE — 72156 MRI NECK SPINE W/O & W/DYE: CPT

## 2024-02-07 PROCEDURE — A9585: CPT

## 2024-02-09 ENCOUNTER — APPOINTMENT (OUTPATIENT)
Dept: OTOLARYNGOLOGY | Facility: CLINIC | Age: 54
End: 2024-02-09
Payer: MEDICARE

## 2024-02-09 ENCOUNTER — APPOINTMENT (OUTPATIENT)
Dept: PULMONOLOGY | Facility: CLINIC | Age: 54
End: 2024-02-09

## 2024-02-09 VITALS
DIASTOLIC BLOOD PRESSURE: 70 MMHG | WEIGHT: 197 LBS | BODY MASS INDEX: 29.18 KG/M2 | SYSTOLIC BLOOD PRESSURE: 104 MMHG | OXYGEN SATURATION: 96 % | HEIGHT: 69 IN | RESPIRATION RATE: 16 BRPM | HEART RATE: 60 BPM

## 2024-02-09 DIAGNOSIS — R09.81 NASAL CONGESTION: ICD-10-CM

## 2024-02-09 DIAGNOSIS — H61.21 IMPACTED CERUMEN, RIGHT EAR: ICD-10-CM

## 2024-02-09 DIAGNOSIS — H91.93 UNSPECIFIED HEARING LOSS, BILATERAL: ICD-10-CM

## 2024-02-09 DIAGNOSIS — K21.9 GASTRO-ESOPHAGEAL REFLUX DISEASE W/OUT ESOPHAGITIS: ICD-10-CM

## 2024-02-09 PROCEDURE — 99204 OFFICE O/P NEW MOD 45 MIN: CPT | Mod: 25

## 2024-02-09 PROCEDURE — 69210 REMOVE IMPACTED EAR WAX UNI: CPT | Mod: RT

## 2024-02-09 PROCEDURE — 31575 DIAGNOSTIC LARYNGOSCOPY: CPT

## 2024-02-09 RX ORDER — OMEPRAZOLE 20 MG/1
20 CAPSULE, DELAYED RELEASE ORAL DAILY
Qty: 30 | Refills: 4 | Status: ACTIVE | COMMUNITY
Start: 2024-02-09 | End: 1900-01-01

## 2024-02-09 RX ORDER — FLUTICASONE PROPIONATE 50 UG/1
50 SPRAY, METERED NASAL
Qty: 1 | Refills: 3 | Status: ACTIVE | COMMUNITY
Start: 2024-02-09 | End: 1900-01-01

## 2024-02-09 NOTE — ASSESSMENT
[FreeTextEntry1] : - Start Omeprazole 20 mg BID to help decrease reflux symptoms  - Diet and lifestyle changes discussed  - Start Flonase 2 sprays QD to helps nasal congestion  - Follow up with audiologist for hearing loss - Follow up in 2 months

## 2024-02-09 NOTE — HISTORY OF PRESENT ILLNESS
[de-identified] : 54 yo male presents today for initial evaluation for hearing loss and nasal congestion. Had nasal reconstruction, Uvulopalatopharyngoplasty, septoplasty, and tonsillectomy several years ago (approx. 9 years ago).  Nasal congestion is all year round, has tried multiple nasal sprays in the past used for several months but gave no relief.  Also c/o hearing loos, started in 2011 after being treated for staph infection. Says hearing is worse in both ears equally. had hearing test done 2 years ago. Is interested in getting hearing aids. Patient also c/o ringing in both ears.  Also c/o shortness of breath, hax hx of asthma, uses inhaler as needed   Denies otalgia, otorrhea, throat pain.  [Hearing Loss] : hearing loss [Tinnitus] : tinnitus [Clear Rhinorrhea] : clear rhinorrhea [Postnasal Drainage] : postnasal drainage

## 2024-02-09 NOTE — REVIEW OF SYSTEMS
[Hearing Loss] : hearing loss [Nasal Congestion] : nasal congestion [Shortness Of Breath] : shortness of breath [Negative] : Heme/Lymph [Ear Pain] : no ear pain [Ear Itch] : no ear itch [Dizziness] : no dizziness [Nose Bleeds] : no nose bleeds [Wheezing] : no wheezing [Cough] : no cough [SOB on Exertion] : no shortness of breath during exertion

## 2024-02-09 NOTE — PHYSICAL EXAM
[] : septum deviated to the left [Midline] : trachea located in midline position [Normal] : no rashes [de-identified] : + cerumen impaction of R ear- removed

## 2024-02-09 NOTE — CONSULT LETTER
[Dear  ___] : Dear  [unfilled], [Courtesy Letter:] : I had the pleasure of seeing your patient, [unfilled], in my office today. [Please see my note below.] : Please see my note below. [Sincerely,] : Sincerely, [FreeTextEntry2] : Sheri Juarez MD [FreeTextEntry3] : Rain Moody, YARELY, PADontrellC Sr. Physician Assistant, Otolaryngology at [facility] Adjunct Clinical Instructor, Department of Physician Assistant Studies, [address] [address]

## 2024-02-09 NOTE — PROCEDURE
[Topical Lidocaine] : topical lidocaine [Oxymetazoline HCl] : oxymetazoline HCl [Flexible Endoscope] : examined with the flexible endoscope [Normal] : posterior cricoid area had healthy pink mucosa in the interarytenoid area and the esophageal inlet [Cerumen Impaction] : Cerumen Impaction [Same] : same as the Pre Op Dx. [] : Removal of Cerumen [Unable to Cooperate with Mirror] : patient unable to cooperate with mirror [Globus] : globus [Serial Number: ___] : Serial Number: [unfilled] [Glottis Arytenoid Cartilages Erythema] : bilateral arytenoid ~M erythema [Arytenoid Erythema ___ /4] : arytenoid erythema [unfilled]U/4 [de-identified] : - a lot of clear mucus secretions noted in the back of the larynx  [FreeTextEntry6] : After informed verbal consent is obtained, cerumen is removed from the R ear canal after applying peroxide to loosen the cerumen.  With the use of a microscope and suctioning, cerumen was removed.  The patient tolerated the procedure well.

## 2024-02-12 ENCOUNTER — APPOINTMENT (OUTPATIENT)
Dept: PULMONOLOGY | Facility: CLINIC | Age: 54
End: 2024-02-12
Payer: MEDICARE

## 2024-02-12 VITALS — DIASTOLIC BLOOD PRESSURE: 81 MMHG | OXYGEN SATURATION: 97 % | HEART RATE: 68 BPM | SYSTOLIC BLOOD PRESSURE: 128 MMHG

## 2024-02-12 DIAGNOSIS — M54.12 RADICULOPATHY, CERVICAL REGION: ICD-10-CM

## 2024-02-12 DIAGNOSIS — R17 UNSPECIFIED JAUNDICE: ICD-10-CM

## 2024-02-12 PROCEDURE — 71046 X-RAY EXAM CHEST 2 VIEWS: CPT

## 2024-02-12 PROCEDURE — 99205 OFFICE O/P NEW HI 60 MIN: CPT

## 2024-02-12 PROCEDURE — 36415 COLL VENOUS BLD VENIPUNCTURE: CPT

## 2024-02-12 NOTE — ASSESSMENT
[FreeTextEntry1] : Labs drawn in office today. Rib Films PRN Ventolin Orthopedic follow-up. Decision on further evaluation pending above. Will follow-up and reassess airways disease when clinically improved.  80-minute spent evaluation management review of studies and discussion.

## 2024-02-12 NOTE — PHYSICAL EXAM
[No Acute Distress] : no acute distress [Normal Oropharynx] : normal oropharynx [Normal Appearance] : normal appearance [No Neck Mass] : no neck mass [Normal Rate/Rhythm] : normal rate/rhythm [Normal S1, S2] : normal s1, s2 [No Murmurs] : no murmurs [No Resp Distress] : no resp distress [Clear to Auscultation Bilaterally] : clear to auscultation bilaterally [No Abnormalities] : no abnormalities [Benign] : benign [Normal Gait] : normal gait [No Clubbing] : no clubbing [No Cyanosis] : no cyanosis [No Edema] : no edema [FROM] : FROM [Normal Color/ Pigmentation] : normal color/ pigmentation [No Focal Deficits] : no focal deficits [Oriented x3] : oriented x3 [Normal Affect] : normal affect [Normal to Percussion] : normal to percussion [Not Tender] : not tender [No HSM] : no hsm [TextBox_80] : Pain on palpation right posterior chest wall approximately 10th rib.  No skin lesions.  No ecchymosis.

## 2024-02-12 NOTE — REASON FOR VISIT
[Consultation] : a consultation [Shortness of Breath] : shortness of breath [TextBox_44] : Atypical chest pain.

## 2024-02-12 NOTE — PROCEDURE
[FreeTextEntry1] : 1 / 1 Ibrahima Escobar  Report date:2/4/2024 View Order (Report matches exam selected in Patient History pane)     ACC: [id number] EXAM: XR CHEST PORTABLE URGENT 1V ORDERED BY: CANDACE HUNTER  PROCEDURE DATE: 02/04/2024    INTERPRETATION: XR CHEST URGENT dated 2/4/2024 12:03 PM  CLINICAL INFORMATION: Male, 53 years old. chest pain.  PRIOR STUDIES: 10/22/2023  FINDINGS/ IMPRESSION: Heart size, mediastinal and hilar contours are unchanged and within normal limits. Lung zones are clear. Soft tissues and osseous structures are intact.  --- End of Report ---      IBRAHIMA ESCOBAR MD; Attending Radiologist This document has been electronically signed. Feb 4 2024 3:45PM  1 / 1 Ibrahima Bennett  Report date:2/8/2024 View Order (Report matches exam selected in Patient History pane)        EXAM: [id number] - MR SPINE CERVICAL WAW IC - ORDERED BY: ALLEN MCKEON   PROCEDURE DATE: 02/07/2024    INTERPRETATION: CERVICAL SPINE MRI  CLINICAL INFORMATION: Pain following chiropractic manipulation.  COMPARISON: Cervical CT dated 2/4/2024. No prior MRI available.  TECHNIQUE: Multiplanar, multisequence MRI was obtained of the cervical spine both prior to and following administration of 10 cc Gadavist intravenous contrast.  FINDINGS:  OSSEOUS: Acute on chronic degenerative endplate changes and corresponding mild patchy enhancement at C6-C7. No acute fracture, spondylolisthesis, or aggressive osseous neoplasm  LEVEL BY LEVEL ANALYSIS:  C1-C2: No significant abnormality.  C2-C3: Left worse than right facet arthrosis contributes to mild left-sided neural canal stenosis. Spinal canal and right neural canal are adequate..  C3-C4: Facet arthrosis contributes to mild left-sided neural canal stenosis. Spinal canal and right neural canal are adequate.  C4-C5: Facet arthrosis contributes to mild bilateral neural canal stenosis. Spinal canal is adequate.  C5-C6: Shallow posterior disc osteophyte complex and facet arthrosis contribute to mild spinal canal stenosis and mild to moderate left worse than right neural canal stenosis.  C6-C7: Right posterior paracentral predominant disc osteophyte complex contributes to mild to moderate spinal canal stenosis with abutment and mild flattening of the right anterior column of the spinal cord. Moderate bilateral neural canal stenosis.  C7-T1: Shallow posterior disc osteophyte complex and facet arthrosis contribute to mild spinal canal stenosis and mild bilateral neural canal stenosis.  THECAL SPACE: No intradural or extradural lesion. No abnormal leptomeningeal enhancement.  SKULL BASE: No significant abnormality within the field of view.  IMPRESSION: 1. No high-grade cervical spinal canal or neural canal stenosis. 2. Discogenic cervical spondylosis most advanced at C6-C7 with acute on chronic endplate osseous stress reaction, mild to moderate spinal canal stenosis, and moderate bilateral neural canal stenosis at this level.  --- End of Report ---       IBRAHIMA BENNETT MD; Attending Radiologist This document has been electronically signed. Feb 8 2024 3:37PM   Notes

## 2024-02-12 NOTE — DISCUSSION/SUMMARY
[FreeTextEntry1] : History of mild intermittent asthma.  Difficult to assess at this time secondary to discomfort.  No active wheezing on physical exam.  No significant cough or congestive symptoms. SOB without definitive evidence of parenchymal disease.  Likely related to discomfort. Chest Pain.   Most likely musculoskeletal in origin based upon clinical presentation and exam. Cervical spine issues needs orthopedic follow-up.

## 2024-02-12 NOTE — HISTORY OF PRESENT ILLNESS
[Never] : never [TextBox_4] : RACHEL OHARA is a 53 year old  M referred for pulmonary evaluation for chest discomfort.  Went to chiropractor for treatment. About 10 days ago.  Felt cack in neck.  Since feeling SOB.  Positive pain and fatigue. Went back to chiropractor had another adjustment got worse. Went to ER at [place].  Told HR in 47-48. Had CT of neck and CXR Also pain in right posterior chest. Sharp pain. Feels SOB.  Feels pain with deep inspiration. Recently given Symbicort after issue with neck. Had pain taking Symbicort.  Saw orthopedic surgeon last Tuesday awaiting result of MRI.   Past pulmonary history. Asthma dx 2019 after surgery for renal cell CA. Has inhalers uses PRN. Albuterol Occupational Exposure. N Family history of pulmonary disease. N Recent travel N Pets N  5/19

## 2024-02-14 LAB
BASOPHILS # BLD AUTO: 0.03 K/UL
BASOPHILS NFR BLD AUTO: 0.4 %
CRP SERPL-MCNC: <3 MG/L
DEPRECATED D DIMER PPP IA-ACNC: <150 NG/ML DDU
EOSINOPHIL # BLD AUTO: 0.11 K/UL
EOSINOPHIL NFR BLD AUTO: 1.5 %
ERYTHROCYTE [SEDIMENTATION RATE] IN BLOOD BY WESTERGREN METHOD: 16 MM/HR
HCT VFR BLD CALC: 44.5 %
HGB BLD-MCNC: 14.4 G/DL
IMM GRANULOCYTES NFR BLD AUTO: 0.3 %
LYMPHOCYTES # BLD AUTO: 2.11 K/UL
LYMPHOCYTES NFR BLD AUTO: 28.6 %
MAN DIFF?: NORMAL
MCHC RBC-ENTMCNC: 28.7 PG
MCHC RBC-ENTMCNC: 32.4 GM/DL
MCV RBC AUTO: 88.8 FL
MONOCYTES # BLD AUTO: 0.67 K/UL
MONOCYTES NFR BLD AUTO: 9.1 %
NEUTROPHILS # BLD AUTO: 4.44 K/UL
NEUTROPHILS NFR BLD AUTO: 60.1 %
PLATELET # BLD AUTO: 258 K/UL
RBC # BLD: 5.01 M/UL
RBC # FLD: 12.5 %
WBC # FLD AUTO: 7.38 K/UL

## 2024-02-26 ENCOUNTER — APPOINTMENT (OUTPATIENT)
Dept: ENDOCRINOLOGY | Facility: CLINIC | Age: 54
End: 2024-02-26
Payer: MEDICARE

## 2024-02-27 ENCOUNTER — APPOINTMENT (OUTPATIENT)
Dept: ENDOCRINOLOGY | Facility: CLINIC | Age: 54
End: 2024-02-27
Payer: MEDICARE

## 2024-02-27 VITALS
TEMPERATURE: 97.7 F | OXYGEN SATURATION: 97 % | SYSTOLIC BLOOD PRESSURE: 148 MMHG | RESPIRATION RATE: 18 BRPM | WEIGHT: 200 LBS | DIASTOLIC BLOOD PRESSURE: 76 MMHG | HEIGHT: 70 IN | HEART RATE: 60 BPM | BODY MASS INDEX: 28.63 KG/M2

## 2024-02-27 DIAGNOSIS — E03.9 HYPOTHYROIDISM, UNSPECIFIED: ICD-10-CM

## 2024-02-27 DIAGNOSIS — R79.89 OTHER SPECIFIED ABNORMAL FINDINGS OF BLOOD CHEMISTRY: ICD-10-CM

## 2024-02-27 PROCEDURE — 99214 OFFICE O/P EST MOD 30 MIN: CPT

## 2024-02-27 RX ORDER — DEXAMETHASONE 1 MG/1
1 TABLET ORAL
Qty: 1 | Refills: 0 | Status: DISCONTINUED | COMMUNITY
Start: 2023-05-10 | End: 2024-02-27

## 2024-02-27 RX ORDER — LEVOTHYROXINE SODIUM 0.1 MG/1
100 TABLET ORAL
Qty: 90 | Refills: 3 | Status: ACTIVE | COMMUNITY
Start: 2023-05-10 | End: 1900-01-01

## 2024-02-27 NOTE — HISTORY OF PRESENT ILLNESS
[FreeTextEntry1] : Problems: 1. Elevated cortisol level 2. Fatigue 3. Primary hypothyroidism  Note - patient had renal cell carcinoma in the past.   Primary hypothyroidism 1. Diagnosed in 2016 - patient never had thyroid surgery/radiation/radioactive iodine therapy 2019 - Thyroglobulin antibodies and TPO antibodies negative 2. Mother had unknown thyroid disorder, no family history of thyroid cancer, no personal history of radiation therapy 3. Meds: Levothyroxine (generic) 100 micrograms po daily - fully compliant and patient advised on the appropriate use of levothyroxine.   Elevated Cortisol level/Fatigue 1. Diagnosed in May 2023 - his serum cortisol level was 18.9 on 05/05/2023 - this was checked as he had fatigue. 2. Labs: 05/03/2023 - serum cortisol 18.9 so patient does not have adrenal insufficiency. 05/11/2023 - 1 mg dexamethasone suppression test Normal 2. No family history of Cushing's syndrome 3. No personal history of DM, BMI on 05/10/2023 was 30.13 4. Patient is not on exogenous glucocorticoids

## 2024-02-27 NOTE — ASSESSMENT
[FreeTextEntry1] : Target: TSH in the normal range for the testing lab  Last TSH was at goal.   Dexamethasone suppression test  in May 2023 was normal so he does not have Cushing's syndrome. The patient does not have adrenal insufficiency as the serum cortisol on 05/03/2023 was > 15   Last TSH - Jan 2024  - N   Plan: 1. Continue levothyroxine (generic) 100 micrograms po daily 2. Labs to done in 6 months - TSH  3. Follow up in 6 months to review results - telehealth visit ok.

## 2024-02-27 NOTE — PHYSICAL EXAM
[de-identified] : General: No distress, well nourished Eyes: Normal Sclera, EOMI, PERRL ENT: Normal appearance of the nose, normal oropharynx Neck/Thyroid: No cervical lymphadenopathy, thyroid gland 20 g in size, no thyroid nodules, non-tender Respiratory: No use of accessory muscles of respiration, vesicular breath sounds heard bilaterally, no crepitations or ronchi Cardiovascular: S1 and S2 heard and normal, no S3 or S4, no murmurs, radial pulse normal bilaterally Abdomen: soft, non-tender, no masses, normal bowel sounds Musculoskeletal: No swelling or deformities of joints of hands, no pedal edema Neurological: Normal range of motion in the hands, Normal brachioradialis reflexes bilaterally Psychiatry: Patient converses normally, good judgement and insight Skin: No rashes in hands, no nodules palpated in hands

## 2024-03-01 ENCOUNTER — APPOINTMENT (OUTPATIENT)
Dept: FAMILY MEDICINE | Facility: CLINIC | Age: 54
End: 2024-03-01
Payer: MEDICARE

## 2024-03-01 VITALS
SYSTOLIC BLOOD PRESSURE: 142 MMHG | TEMPERATURE: 97.7 F | WEIGHT: 197 LBS | BODY MASS INDEX: 28.2 KG/M2 | RESPIRATION RATE: 18 BRPM | HEIGHT: 70 IN | HEART RATE: 87 BPM | OXYGEN SATURATION: 98 % | DIASTOLIC BLOOD PRESSURE: 92 MMHG

## 2024-03-01 DIAGNOSIS — H53.8 OTHER VISUAL DISTURBANCES: ICD-10-CM

## 2024-03-01 DIAGNOSIS — H93.19 TINNITUS, UNSPECIFIED EAR: ICD-10-CM

## 2024-03-01 DIAGNOSIS — F41.9 ANXIETY DISORDER, UNSPECIFIED: ICD-10-CM

## 2024-03-01 DIAGNOSIS — H91.93 UNSPECIFIED HEARING LOSS, BILATERAL: ICD-10-CM

## 2024-03-01 PROCEDURE — 99215 OFFICE O/P EST HI 40 MIN: CPT

## 2024-03-01 RX ORDER — ELECTROLYTES/DEXTROSE
SOLUTION, ORAL ORAL DAILY
Qty: 30 | Refills: 5 | Status: ACTIVE | COMMUNITY
Start: 2023-05-02 | End: 1900-01-01

## 2024-03-01 RX ORDER — ERGOCALCIFEROL 1.25 MG/1
1.25 MG CAPSULE, LIQUID FILLED ORAL
Qty: 12 | Refills: 0 | Status: ACTIVE | COMMUNITY
Start: 2020-09-28 | End: 1900-01-01

## 2024-03-01 RX ORDER — METOPROLOL SUCCINATE 25 MG/1
25 TABLET, EXTENDED RELEASE ORAL
Qty: 15 | Refills: 3 | Status: ACTIVE | COMMUNITY
Start: 2024-03-01 | End: 1900-01-01

## 2024-03-01 NOTE — PHYSICAL EXAM
[No Acute Distress] : no acute distress [Well Nourished] : well nourished [Well Developed] : well developed [Well-Appearing] : well-appearing [Normal Sclera/Conjunctiva] : normal sclera/conjunctiva [PERRL] : pupils equal round and reactive to light [EOMI] : extraocular movements intact [Normal Outer Ear/Nose] : the outer ears and nose were normal in appearance [Normal Oropharynx] : the oropharynx was normal [No JVD] : no jugular venous distention [No Lymphadenopathy] : no lymphadenopathy [Supple] : supple [No Respiratory Distress] : no respiratory distress  [Thyroid Normal, No Nodules] : the thyroid was normal and there were no nodules present [No Accessory Muscle Use] : no accessory muscle use [Clear to Auscultation] : lungs were clear to auscultation bilaterally [Normal Rate] : normal rate  [Normal S1, S2] : normal S1 and S2 [Regular Rhythm] : with a regular rhythm [No Murmur] : no murmur heard [No Carotid Bruits] : no carotid bruits [No Abdominal Bruit] : a ~M bruit was not heard ~T in the abdomen [No Varicosities] : no varicosities [No Edema] : there was no peripheral edema [Pedal Pulses Present] : the pedal pulses are present [No Palpable Aorta] : no palpable aorta [No Extremity Clubbing/Cyanosis] : no extremity clubbing/cyanosis [Soft] : abdomen soft [Non Tender] : non-tender [Non-distended] : non-distended [No Masses] : no abdominal mass palpated [Normal Bowel Sounds] : normal bowel sounds [No HSM] : no HSM [Normal Posterior Cervical Nodes] : no posterior cervical lymphadenopathy [Normal Anterior Cervical Nodes] : no anterior cervical lymphadenopathy [No CVA Tenderness] : no CVA  tenderness [No Joint Swelling] : no joint swelling [No Spinal Tenderness] : no spinal tenderness [Grossly Normal Strength/Tone] : grossly normal strength/tone [No Rash] : no rash [Coordination Grossly Intact] : coordination grossly intact [No Focal Deficits] : no focal deficits [Normal Gait] : normal gait [Deep Tendon Reflexes (DTR)] : deep tendon reflexes were 2+ and symmetric [Normal Affect] : the affect was normal [Normal Insight/Judgement] : insight and judgment were intact

## 2024-03-04 ENCOUNTER — APPOINTMENT (OUTPATIENT)
Dept: PULMONOLOGY | Facility: CLINIC | Age: 54
End: 2024-03-04

## 2024-03-07 VITALS — SYSTOLIC BLOOD PRESSURE: 139 MMHG | DIASTOLIC BLOOD PRESSURE: 89 MMHG

## 2024-03-07 NOTE — HISTORY OF PRESENT ILLNESS
[de-identified] : RACHEL OHARA  is an 53 year-old male presents today with a chief complaint of cervical neck pain after a chiropractic adjustment.  He reports that he underwent chiropractic adjustment and then developed worsening neck and head pain.  He went to the ER for initial imaging workup which was negative.  Denies any blurry vision or difficulty with balance  Patient points to the cervical spine, specifically right side as maximum point of tenderness. Pain is typically 9-10/10 in severity, sharp in quality with certain activities. Symptoms are improved with rest and modifications of daily routines. Patient denies any radiation of symptoms beyond the surrounding area.    A complete review of symptoms as well as past medical/surgical history, medications, allergies, social and family history, and other details of HPI and exam were reviewed per first visit intake form and updated accordingly. Additional and more relevant details are noted in further detail today.

## 2024-03-07 NOTE — PHYSICAL EXAM
[de-identified] : Spine Exam Skin: intact Palpation:  non ttp, No step off appreciated and Deformity tender to palpation along the lower cervical spine especially the right side Motor/Sensation:  Intact 5/5, SILT C5-T1 BUE and L2-S1 BLE unless specified   C5 (elbow flexion):  Left: 5/5, sensation intact   Right: 5/5, sensation intact C6 (wrist extension):  Left: 5/5, sensation intact   Right: 5/5, sensation intact C7 (elbow extension):  Left: 5/5, sensation intact   Right: 5/5, sensation intact C8 (finger flexion): Left: 5/5, sensation intact Right: 5/5, sensation intact T1 (IO):  Left: 5/5, sensation intact    Right: 5/5, sensation intact   Lower Extremity L2 (hip flexion):  Left: 5/5, sensation intact  Right: 5/5 sensation intact L3 (knee extension): Left: 5/5, sensation intact  Right: 5/5 sensation intact L4 (ankle dorsiflexion):  Left: 5/5, sensation intact   Right: 5/5 sensation intact  L5 (EHL):  Left: 5/5, sensation intact   Right: 5/5 sensation intact S1 (ankle plantarflexion):  Left: 5/5, sensation intact  Right: 5/5 sensation intact   Reflexes: Biceps:  left: 2+ and right: 2+, Triceps:  left: 2+ and right: 2+, Brachioradialis:  left: 2+ and right: 2+, Quad:  left: 2+ and right: 2+  Achilles: left: 2+ and right 2+ Pathologic Reflexes: Michelle's:  Left: neg   Right: neg Rectal: deferred Gait: wnl Pupils equal round and reactive [de-identified] : Imagin views of the cervical spine including flexion extension views were obtained today that show  no obvious fracture, or dislocation. There are degenerative changes seen with an anterior disc osteophyte complex at C6-7. There is no malalignment. No obvious osseous abnormality. Otherwise unremarkable.

## 2024-03-07 NOTE — DISCUSSION/SUMMARY
[de-identified] : I discussed nonoperative treatment options for their neck pain with radiculopathy. We discussed nonoperative treatments options including activity modification, therapy, gait aides, anti-inflammatory medications. I explained that I would like to obtain an MRI with and without contrast for evaluation of the cervical spine and to also ensure no injury to the blood vessels given the history of manipulation with onset of pain.  While the majority of these will resolve with physical therapy however if they develop worsening pain, numbness, weakness, difficulty with balance or fine motor tasks, fevers, chills, night sweats, chest pain, shortness of breath, saddle anesthesia, bowel or bladder incontinence they should present to the emergency department as soon as possible. They know to reach out to our team with any questions or concerns.   I discussed with them that I would prescribe an anti-inflammatory medication taper that should be taken once a day with meals. They should not take this while also taking Aleve (Naprosyn), Motrin/ Advil (Ibuprofen), Toradol (ketoralac). They must stop taking it if they develops stomach pain, increased bleeding or bruising and they should follow-up with their primary care doctor for routine blood work including kidney function to monitor its effect. While it Is not a habit-forming substance, it should only  be taken as needed and to discontinue use once symptoms have resolved.  They already taking send medication as well as Valium.  Recommended lidocaine patches   My cumulative time spent on this patients visit included: Preparation for the visit, review of the medical records, review of pertinent diagnostic studies, examination and counseling of the patient on the above diagnosis, treatment plan and prognosis, orders of diagnostic tests, medications and/or appropriate procedures and documentation in the medical records of todays visit.

## 2024-03-07 NOTE — HISTORY OF PRESENT ILLNESS
[de-identified] : RACHEL OHARA  is an 53 year-old male presents today with a chief complaint of cervical neck pain after a chiropractic adjustment.  He reports that he underwent chiropractic adjustment and then developed worsening neck and head pain.  He went to the ER for initial imaging workup which was negative.  Denies any blurry vision or difficulty with balance  Patient points to the cervical spine, specifically right side as maximum point of tenderness. Pain is typically 9-10/10 in severity, sharp in quality with certain activities. Symptoms are improved with rest and modifications of daily routines. Patient denies any radiation of symptoms beyond the surrounding area.    A complete review of symptoms as well as past medical/surgical history, medications, allergies, social and family history, and other details of HPI and exam were reviewed per first visit intake form and updated accordingly. Additional and more relevant details are noted in further detail today.

## 2024-03-07 NOTE — DISCUSSION/SUMMARY
[de-identified] : I discussed nonoperative treatment options for their neck pain with radiculopathy. We discussed nonoperative treatments options including activity modification, therapy, gait aides, anti-inflammatory medications. I explained that I would like to obtain an MRI with and without contrast for evaluation of the cervical spine and to also ensure no injury to the blood vessels given the history of manipulation with onset of pain.  While the majority of these will resolve with physical therapy however if they develop worsening pain, numbness, weakness, difficulty with balance or fine motor tasks, fevers, chills, night sweats, chest pain, shortness of breath, saddle anesthesia, bowel or bladder incontinence they should present to the emergency department as soon as possible. They know to reach out to our team with any questions or concerns.   I discussed with them that I would prescribe an anti-inflammatory medication taper that should be taken once a day with meals. They should not take this while also taking Aleve (Naprosyn), Motrin/ Advil (Ibuprofen), Toradol (ketoralac). They must stop taking it if they develops stomach pain, increased bleeding or bruising and they should follow-up with their primary care doctor for routine blood work including kidney function to monitor its effect. While it Is not a habit-forming substance, it should only  be taken as needed and to discontinue use once symptoms have resolved.  They already taking send medication as well as Valium.  Recommended lidocaine patches   My cumulative time spent on this patients visit included: Preparation for the visit, review of the medical records, review of pertinent diagnostic studies, examination and counseling of the patient on the above diagnosis, treatment plan and prognosis, orders of diagnostic tests, medications and/or appropriate procedures and documentation in the medical records of todays visit.

## 2024-03-07 NOTE — PLAN
[FreeTextEntry1] : Arnica  Mg for leg cramps.   form completed for disability. stress management.  Metoprolol 25 mg start with 1/2 tab daily. f/u in a month. audiology referral.  MVI and D3 supplement.

## 2024-03-07 NOTE — HEALTH RISK ASSESSMENT
[No] : In the past 12 months have you used drugs other than those required for medical reasons? No [No falls in past year] : Patient reported no falls in the past year [0] : 2) Feeling down, depressed, or hopeless: Not at all (0) [PHQ-2 Negative - No further assessment needed] : PHQ-2 Negative - No further assessment needed [Never] : Never [de-identified] : plant based [[id number]] : 0

## 2024-03-07 NOTE — PHYSICAL EXAM
[de-identified] : Spine Exam Skin: intact Palpation:  non ttp, No step off appreciated and Deformity tender to palpation along the lower cervical spine especially the right side Motor/Sensation:  Intact 5/5, SILT C5-T1 BUE and L2-S1 BLE unless specified   C5 (elbow flexion):  Left: 5/5, sensation intact   Right: 5/5, sensation intact C6 (wrist extension):  Left: 5/5, sensation intact   Right: 5/5, sensation intact C7 (elbow extension):  Left: 5/5, sensation intact   Right: 5/5, sensation intact C8 (finger flexion): Left: 5/5, sensation intact Right: 5/5, sensation intact T1 (IO):  Left: 5/5, sensation intact    Right: 5/5, sensation intact   Lower Extremity L2 (hip flexion):  Left: 5/5, sensation intact  Right: 5/5 sensation intact L3 (knee extension): Left: 5/5, sensation intact  Right: 5/5 sensation intact L4 (ankle dorsiflexion):  Left: 5/5, sensation intact   Right: 5/5 sensation intact  L5 (EHL):  Left: 5/5, sensation intact   Right: 5/5 sensation intact S1 (ankle plantarflexion):  Left: 5/5, sensation intact  Right: 5/5 sensation intact   Reflexes: Biceps:  left: 2+ and right: 2+, Triceps:  left: 2+ and right: 2+, Brachioradialis:  left: 2+ and right: 2+, Quad:  left: 2+ and right: 2+  Achilles: left: 2+ and right 2+ Pathologic Reflexes: Michelle's:  Left: neg   Right: neg Rectal: deferred Gait: wnl Pupils equal round and reactive [de-identified] : Imagin views of the cervical spine including flexion extension views were obtained today that show  no obvious fracture, or dislocation. There are degenerative changes seen with an anterior disc osteophyte complex at C6-7. There is no malalignment. No obvious osseous abnormality. Otherwise unremarkable.

## 2024-03-07 NOTE — HISTORY OF PRESENT ILLNESS
[FreeTextEntry8] : c/o leg cramps for several weeks, intermittent . He is eating plant-based diet and his energy is better. He has HTN . He stopped Metoprolol due to fatigue and bradycardia and now is experiencing Anxiety and increased BP. He is AAOX3,NAD.   c/o ringing in ear and hearing loss b/l , worse recently and is requesting audiology referral.

## 2024-03-08 ENCOUNTER — APPOINTMENT (OUTPATIENT)
Dept: PULMONOLOGY | Facility: CLINIC | Age: 54
End: 2024-03-08
Payer: MEDICARE

## 2024-03-08 VITALS
BODY MASS INDEX: 28.2 KG/M2 | HEART RATE: 77 BPM | OXYGEN SATURATION: 97 % | HEIGHT: 70 IN | DIASTOLIC BLOOD PRESSURE: 80 MMHG | WEIGHT: 197 LBS | SYSTOLIC BLOOD PRESSURE: 120 MMHG

## 2024-03-08 PROCEDURE — G2211 COMPLEX E/M VISIT ADD ON: CPT

## 2024-03-08 PROCEDURE — 99417 PROLNG OP E/M EACH 15 MIN: CPT

## 2024-03-08 PROCEDURE — 99215 OFFICE O/P EST HI 40 MIN: CPT

## 2024-03-08 NOTE — HISTORY OF PRESENT ILLNESS
[Never] : never [TextBox_4] : seen by Dr Abbasi     2/2024     did not follow up  discussed to odin nguyen  with  regular  pulmonary   3/8/2024 52y/o male  born in  [facility]      never smoker    work    construction   company - h/o renal  cancer - left nephrectomy  2019    injury  with  left knee  ACL   back fusion  at [facility] compliacted by staph  infection -   h/o  shoulder  surgery bilateral  bridge nose  laser -   throat issue nasal reconstruction, Uvulopalatopharyngoplasty, septoplasty, and tonsillectomy several years ago (approx. 9 years ago).  seen by ENT recently -     -     now fall with   right hand   injury  here for   asthma  -asthma   dx 2019 -   no intubation     symbicort      Ventolin  - did not continue   Symbicort   steroids  did not  like  -  does not like  to take steroids - chart reviewed  negative d dimer  -seen by ENT  2/2024  started  on  omeprazole for  gerd     flonase  for  nasal congestion   -imaging    ct abd  1/2029  lower chest normal

## 2024-03-08 NOTE — ASSESSMENT
[FreeTextEntry1] : 52y/o  male  1- asthma   stable 2- allergic rhintis     h/o  GERD    nasal reconstruction, Uvulopalatopharyngoplasty, septoplasty, and tonsillectomy  3- right chest pain  with  h/o  multiple falls and injury  recent right hand injury  4- vaccination per primary   Recommendations 1- symbicort   encouraged to use  SMART   prn     2- ct chest   3- PPI 4- nasal sprays f/u   ENT 5-   is going to ED due to  recent  right hand injury 6- PFT  chart reviewed  encouraged to return to his prior   Pulm MD    - prevention and  discussion   agreement on plan

## 2024-03-08 NOTE — REVIEW OF SYSTEMS
[Chest Discomfort] : chest discomfort [Back Pain] : back pain [Chronic Pain] : chronic pain [Thyroid Problem] : thyroid problem [Fever] : no fever [Fatigue] : no fatigue [Recent Wt Gain (___ Lbs)] : ~T no recent weight gain [Chills] : no chills [Poor Appetite] : no poor appetite [Recent Wt Loss (___ Lbs)] : ~T no recent weight loss [Sore Throat] : no sore throat [Epistaxis] : no epistaxis [Nasal Congestion] : no nasal congestion [Dry Mouth] : no dry mouth [Postnasal Drip] : no postnasal drip [Sinus Problems] : no sinus problems [Cough] : no cough [Hemoptysis] : no hemoptysis [Dyspnea] : no dyspnea [Sputum] : no sputum [Wheezing] : no wheezing [SOB on Exertion] : no sob on exertion [GERD] : no gerd [Abdominal Pain] : no abdominal pain [Nausea] : no nausea [Vomiting] : no vomiting [Dysphagia] : no dysphagia [Bleeding] : no bleeding [Rash] : no rash [Clotting Disorder/ Frequent bleeding] : no clotting disorder/ frequent bleeding [Blood Transfusion] : no blood transfusion [Diabetes] : no diabetes [Obesity] : no obesity [TextBox_141] : hypothyroid

## 2024-03-08 NOTE — PHYSICAL EXAM
[I] : Mallampati Class: I [Supple] : supple [No Neck Mass] : no neck mass [No JVD] : no jvd [Normal Rate/Rhythm] : normal rate/rhythm [Normal S1, S2] : normal s1, s2 [No Murmurs] : no murmurs [No Resp Distress] : no resp distress [No Acc Muscle Use] : no acc muscle use [Clear to Auscultation Bilaterally] : clear to auscultation bilaterally [Not Tender] : not tender [Benign] : benign [No Masses] : no masses [Soft] : soft [No Hernias] : no hernias [Normal Bowel Sounds] : normal bowel sounds [No Clubbing] : no clubbing [Normal Gait] : normal gait [No Edema] : no edema [No Rash] : no rash [No Motor Deficits] : no motor deficits [Normal Affect] : normal affect [TextBox_2] : pleasant male well built no distress no cough  [TextBox_11] : evidence of  UVPP    moist n olesion  [TextBox_99] :   well healed   scar     lumbar spine [TextBox_105] : right  wrist in     bandage

## 2024-03-12 ENCOUNTER — OUTPATIENT (OUTPATIENT)
Dept: OUTPATIENT SERVICES | Facility: HOSPITAL | Age: 54
LOS: 1 days | End: 2024-03-12
Payer: MEDICARE

## 2024-03-12 ENCOUNTER — APPOINTMENT (OUTPATIENT)
Dept: CT IMAGING | Facility: CLINIC | Age: 54
End: 2024-03-12
Payer: MEDICARE

## 2024-03-12 DIAGNOSIS — M17.10 UNILATERAL PRIMARY OSTEOARTHRITIS, UNSPECIFIED KNEE: Chronic | ICD-10-CM

## 2024-03-12 DIAGNOSIS — R06.83 SNORING: Chronic | ICD-10-CM

## 2024-03-12 DIAGNOSIS — M12.9 ARTHROPATHY, UNSPECIFIED: Chronic | ICD-10-CM

## 2024-03-12 DIAGNOSIS — K21.9 GASTRO-ESOPHAGEAL REFLUX DISEASE WITHOUT ESOPHAGITIS: ICD-10-CM

## 2024-03-12 DIAGNOSIS — J45.909 UNSPECIFIED ASTHMA, UNCOMPLICATED: ICD-10-CM

## 2024-03-12 DIAGNOSIS — Z00.8 ENCOUNTER FOR OTHER GENERAL EXAMINATION: ICD-10-CM

## 2024-03-12 DIAGNOSIS — Z98.1 ARTHRODESIS STATUS: Chronic | ICD-10-CM

## 2024-03-12 PROCEDURE — 71250 CT THORAX DX C-: CPT | Mod: 26

## 2024-03-12 PROCEDURE — 71250 CT THORAX DX C-: CPT

## 2024-03-25 ENCOUNTER — APPOINTMENT (OUTPATIENT)
Dept: PAIN MANAGEMENT | Facility: CLINIC | Age: 54
End: 2024-03-25
Payer: MEDICARE

## 2024-03-25 ENCOUNTER — LABORATORY RESULT (OUTPATIENT)
Age: 54
End: 2024-03-25

## 2024-03-25 VITALS
DIASTOLIC BLOOD PRESSURE: 80 MMHG | BODY MASS INDEX: 28.2 KG/M2 | SYSTOLIC BLOOD PRESSURE: 132 MMHG | HEIGHT: 70 IN | WEIGHT: 197 LBS | HEART RATE: 60 BPM

## 2024-03-25 DIAGNOSIS — G89.4 CHRONIC PAIN SYNDROME: ICD-10-CM

## 2024-03-25 PROCEDURE — 99214 OFFICE O/P EST MOD 30 MIN: CPT

## 2024-03-25 NOTE — HISTORY OF PRESENT ILLNESS
[FreeTextEntry1] : 54 y/o M with Pmhx HTN, hypothyroidism, asthma, anxiety, chronic back pain s/p multiple fusions as well as chronic migraines who presents today for follow up and MM recertification.  Overall since his last visit he feels has been doing very well.  Some increase in lower back pain for which he started medrol dose pack.  He continues to do very well in regards to HA, mood/anxiety and pain, no AE.  He is getting PT actively. Since starting this he is no longer having Migraines.    Denies hallucination, dysrhythmia.   Previous meds: Propranolol, Topamax, Quetiapine 25 mg q hs for sleep Current meds include: Levothyroxine 100 mcg, MM,  Xanax ( infrequently)

## 2024-03-25 NOTE — ASSESSMENT
[FreeTextEntry1] : 54 y/o M with chronic pain, chronic daily headaches resolved with the use of MM .    Recertification extended on this visit.  Urine drug screen done today.  fu in 6-12 months or sooner if needed.  I-Stop reviewed,  reference #: [id number]  Mr. RACHEL OHARA denies any history of psychosis, immediate family history of psychosis or arrhythmia. In my opinion He would benefit from medical marijuana. In regards to ratio, I believe He would benefit from a one-to-one plus a low THC: high CBD. He  has been advised of the potential risks and benefits of this agent. He has also been advised been advised not to drive up to four hours after taking medical marijuana. Patient has signed and fully understands Medical Cannabis Treatment Agreement our guidelines for prescription medication/cannabis and drug screening.  Medical marijuana agreement was reviewed and signed by patient.

## 2024-03-25 NOTE — PHYSICAL EXAM
[FreeTextEntry1] : Constitutional: No signs of distress. No signs of toxicity.  MS: Alert and well oriented. Speech fluent. No aphasia. Fund of knowledge intact.  Psychiatric: Mood stable. Motor: Adequate bulk, tone, strength. 5/5 strength DTR: present and symmetrical; no clonus Sensory: intact to primary and secondary modalities; neg Romberg Cerebellar and gait: intact Eyes: no redness or swelling HEENT: intact Neck: No masses noted Pulmonary: no respiratory distress Vascular: no temperature,color changes; no edema Musculoskeletal: examination of the cervical spine reveals no midline tenderness, range of motion full upon flexion, extension and lateral rotation.  Skin: No rash.

## 2024-03-26 NOTE — ED PROVIDER NOTE - CROS ED NEURO ALL NEG
PRE-SEDATION ASSESSMENT    CONSENT  Consent for procedure and sedation obtained: Yes    MEDICAL HISTORY  Significant medical/surgical history: Yes  Past Complications with Sedation/Anesthesia: No  Significant Family History: No  Smoking History: No  Alcohol/Drug abuse: No  Possible Pregnancy (LMP): No  Cardiac History: Yes  Respiratory History: Yes    PHYSICAL EXAM  History and Physical Reviewed: Patient has valid H&P within 30 days. I have reviewed and there are no changes. Airway Anatomy : Class II  Heart : Normal  Lungs : Normal  LOC/Mental Status : Normal    OTHER FINDINGS    SEDATION RISK ASSESSMENT    American Society of Anesthesiologists (ASA) A Physical Status Classifications  (For emergency operations, add the letter E after the classification)        ASA 1 A normal healthy patient, (Healthy, non smoker, no or minimal alcohol use). ASA 2 A patient with a mild systemic disease, (Mild diseases, no substantive functional limitations. E.g. obesity, smoker, occasional drinker, well controlled DM/HTN). ASA 3 A patient with severe systemic disease (Substantive functional Limitations. One or more moderate to severe diseases. E.g. COPD, uncontrolled DM/HTN, morbid obesity, pacemaker, CAD, CVA). ASA 4 A patient with severe systemic disease that is a constant threat to life  (Recent MI, CVA, or TIA (<3 months), ESRD, ongoing cardiac ischemia, DIC, ARDS, low ejection fraction). ASA 5 A moribund patient who is not expected to survive without the operation (Ruptured aorta, massive trauma, multiple organ/systems pathology). ASA 6 A brain dead patient for organ harvesting. Risk Status ASA: Class II - Normal patient with mild systemic disease  Plan for Sedation: Moderate Sedation  EKG Monitoring: Yes    NARRATIVE FINDINGS  Risk Status ASA: Class II   Narrative Findings: Mallampati Class II     [employer] Study of Health and Aging Clinical Frailty Score:    Well/Managing Well.     Time out performed prior to the commencement of the procedure to verify the patient and the procedure to be performed. Cath lab RN and  technician in attendance at the time of the time out. negative...

## 2024-03-27 ENCOUNTER — APPOINTMENT (OUTPATIENT)
Dept: ORTHOPEDIC SURGERY | Facility: CLINIC | Age: 54
End: 2024-03-27
Payer: MEDICARE

## 2024-03-27 DIAGNOSIS — M65.4 RADIAL STYLOID TENOSYNOVITIS [DE QUERVAIN]: ICD-10-CM

## 2024-03-27 DIAGNOSIS — S69.91XD UNSPECIFIED INJURY OF RIGHT WRIST, HAND AND FINGER(S), SUBSEQUENT ENCOUNTER: ICD-10-CM

## 2024-03-27 PROCEDURE — 99204 OFFICE O/P NEW MOD 45 MIN: CPT

## 2024-03-27 PROCEDURE — 73110 X-RAY EXAM OF WRIST: CPT | Mod: RT

## 2024-03-27 PROCEDURE — 99214 OFFICE O/P EST MOD 30 MIN: CPT

## 2024-03-27 RX ORDER — METHYLPREDNISOLONE 4 MG/1
4 TABLET ORAL
Qty: 1 | Refills: 0 | Status: ACTIVE | COMMUNITY
Start: 2024-03-27 | End: 1900-01-01

## 2024-03-28 ENCOUNTER — APPOINTMENT (OUTPATIENT)
Dept: MRI IMAGING | Facility: CLINIC | Age: 54
End: 2024-03-28
Payer: MEDICARE

## 2024-03-28 ENCOUNTER — OUTPATIENT (OUTPATIENT)
Dept: OUTPATIENT SERVICES | Facility: HOSPITAL | Age: 54
LOS: 1 days | End: 2024-03-28
Payer: MEDICARE

## 2024-03-28 DIAGNOSIS — S69.91XD UNSPECIFIED INJURY OF RIGHT WRIST, HAND AND FINGER(S), SUBSEQUENT ENCOUNTER: ICD-10-CM

## 2024-03-28 DIAGNOSIS — M12.9 ARTHROPATHY, UNSPECIFIED: Chronic | ICD-10-CM

## 2024-03-28 DIAGNOSIS — M17.10 UNILATERAL PRIMARY OSTEOARTHRITIS, UNSPECIFIED KNEE: Chronic | ICD-10-CM

## 2024-03-28 DIAGNOSIS — Z98.1 ARTHRODESIS STATUS: Chronic | ICD-10-CM

## 2024-03-28 DIAGNOSIS — R06.83 SNORING: Chronic | ICD-10-CM

## 2024-03-28 PROCEDURE — 73221 MRI JOINT UPR EXTREM W/O DYE: CPT

## 2024-03-28 PROCEDURE — 73221 MRI JOINT UPR EXTREM W/O DYE: CPT | Mod: 26,RT

## 2024-03-29 LAB
AMPHET UR-MCNC: NEGATIVE NG/ML
BARBITURATES UR-MCNC: NEGATIVE NG/ML
BENZODIAZ UR-MCNC: NEGATIVE NG/ML
COCAINE METAB.OTHER UR-MCNC: NEGATIVE NG/ML
CREATININE, URINE: 116.5 MG/DL
FENTANYL, URINE: NEGATIVE NG/ML
METHADONE UR-MCNC: NEGATIVE NG/ML
OPIATES UR-MCNC: NEGATIVE NG/ML
OXYCODONE/OXYMORPHONE, URINE: NEGATIVE NG/ML
PCP UR-MCNC: NEGATIVE NG/ML
PH, URINE: 5.6
PLEASE NOTE: DRUGSCRUR: NORMAL
THC UR QL: NORMAL NG/ML

## 2024-04-04 ENCOUNTER — NON-APPOINTMENT (OUTPATIENT)
Age: 54
End: 2024-04-04

## 2024-04-11 ENCOUNTER — APPOINTMENT (OUTPATIENT)
Dept: FAMILY MEDICINE | Facility: CLINIC | Age: 54
End: 2024-04-11
Payer: MEDICARE

## 2024-04-11 VITALS
BODY MASS INDEX: 28.49 KG/M2 | RESPIRATION RATE: 17 BRPM | TEMPERATURE: 97.6 F | OXYGEN SATURATION: 98 % | SYSTOLIC BLOOD PRESSURE: 130 MMHG | HEIGHT: 70 IN | WEIGHT: 199 LBS | HEART RATE: 74 BPM | DIASTOLIC BLOOD PRESSURE: 78 MMHG

## 2024-04-11 DIAGNOSIS — R41.82 ALTERED MENTAL STATUS, UNSPECIFIED: ICD-10-CM

## 2024-04-11 DIAGNOSIS — S80.12XS CONTUSION OF LEFT LOWER LEG, SEQUELA: ICD-10-CM

## 2024-04-11 PROCEDURE — 99496 TRANSJ CARE MGMT HIGH F2F 7D: CPT

## 2024-04-11 RX ORDER — RIVAROXABAN 15 MG/1
15 TABLET, FILM COATED ORAL
Qty: 36 | Refills: 0 | Status: ACTIVE | COMMUNITY
Start: 2024-04-11 | End: 1900-01-01

## 2024-04-15 ENCOUNTER — NON-APPOINTMENT (OUTPATIENT)
Age: 54
End: 2024-04-15

## 2024-04-17 ENCOUNTER — APPOINTMENT (OUTPATIENT)
Dept: CT IMAGING | Facility: CLINIC | Age: 54
End: 2024-04-17
Payer: MEDICARE

## 2024-04-17 ENCOUNTER — APPOINTMENT (OUTPATIENT)
Dept: PULMONOLOGY | Facility: CLINIC | Age: 54
End: 2024-04-17
Payer: MEDICARE

## 2024-04-17 ENCOUNTER — OUTPATIENT (OUTPATIENT)
Dept: OUTPATIENT SERVICES | Facility: HOSPITAL | Age: 54
LOS: 1 days | End: 2024-04-17
Payer: MEDICARE

## 2024-04-17 VITALS
HEART RATE: 62 BPM | TEMPERATURE: 97.5 F | OXYGEN SATURATION: 98 % | DIASTOLIC BLOOD PRESSURE: 70 MMHG | SYSTOLIC BLOOD PRESSURE: 122 MMHG

## 2024-04-17 DIAGNOSIS — M17.10 UNILATERAL PRIMARY OSTEOARTHRITIS, UNSPECIFIED KNEE: Chronic | ICD-10-CM

## 2024-04-17 DIAGNOSIS — I26.99 OTHER PULMONARY EMBOLISM W/OUT ACUTE COR PULMONALE: ICD-10-CM

## 2024-04-17 DIAGNOSIS — I26.99 OTHER PULMONARY EMBOLISM WITHOUT ACUTE COR PULMONALE: ICD-10-CM

## 2024-04-17 PROCEDURE — 71275 CT ANGIOGRAPHY CHEST: CPT | Mod: 26

## 2024-04-17 PROCEDURE — 71275 CT ANGIOGRAPHY CHEST: CPT

## 2024-04-17 PROCEDURE — 99214 OFFICE O/P EST MOD 30 MIN: CPT

## 2024-04-17 NOTE — PHYSICAL EXAM
[III] : Mallampati Class: III [Normal Appearance] : normal appearance [Supple] : supple [No Neck Mass] : no neck mass [No JVD] : no jvd [Normal Rate/Rhythm] : normal rate/rhythm [Normal S1, S2] : normal s1, s2 [No Resp Distress] : no resp distress [No Acc Muscle Use] : no acc muscle use [Clear to Auscultation Bilaterally] : clear to auscultation bilaterally [Benign] : benign [Not Tender] : not tender [No Masses] : no masses [Soft] : soft [No Hernias] : no hernias [Normal Bowel Sounds] : normal bowel sounds [No Clubbing] : no clubbing [No Edema] : no edema [No Focal Deficits] : no focal deficits [Normal Affect] : normal affect [TextBox_2] : pleasant male  alert conversant no cough  [TextBox_11] : moist no lesions  [TextBox_99] : back --  well healed  lumbar scar  he points there to area of radiating   pain chronic , slow gait  [TextBox_125] : no  ecchymosis  no deformity noted     no bleeding

## 2024-04-17 NOTE — ASSESSMENT
[FreeTextEntry1] : 52y/o  male  1- 4/4/2024   recent hospitalization     [facility] - notes - 4/4/24   ctpa + PE   2- asthma   stable /   allergic rhinitis     h/o  GERD    nasal reconstruction, Uvulopalatopharyngoplasty, septoplasty, and tonsillectomy  3- right chest pain  with  h/o  multiple falls and injury  recent right hand injury  4- multiple pain  complaints   ? police issue 5-  h/o 2019  left  nephrectomy   renal  cell cancer 6- vaccination per primary   Recommendations 1- repeat   ctpa   since pain    ? bleed 2- old records  requested to review  [facility] 3-  symbicort   encouraged to use  SMART   prn     4- pain referal    ,  f/u  his ortho   back pain  5- heme onc input with work up    risk factor is his renal cell cancer  needs enioo patrick imaging and  work up reviewed with patient and his wife not sure in [facility]   - referral given as well    -  discussion to go to ED if  needed  worse pain and multiple pain complaints

## 2024-04-17 NOTE — PROCEDURE
[FreeTextEntry1] : resting room air   :  sats   98%  hr  62    walk in hallway  he uses   cane  slow   sats room air   96%  heart rate   76

## 2024-04-17 NOTE — REVIEW OF SYSTEMS
[Chest Discomfort] : chest discomfort [Back Pain] : back pain [Chronic Pain] : chronic pain [Thyroid Problem] : thyroid problem [Recent Wt Loss (___ Lbs)] : ~T recent [unfilled] lb weight loss [Wheezing] : wheezing [SOB on Exertion] : sob on exertion [Clotting Disorder/ Frequent bleeding] : clotting disorder/ frequent bleeding [Fever] : no fever [Fatigue] : no fatigue [Recent Wt Gain (___ Lbs)] : ~T no recent weight gain [Chills] : no chills [Poor Appetite] : no poor appetite [Epistaxis] : no epistaxis [Sore Throat] : no sore throat [Nasal Congestion] : no nasal congestion [Postnasal Drip] : no postnasal drip [Dry Mouth] : no dry mouth [Sinus Problems] : no sinus problems [Cough] : no cough [Hemoptysis] : no hemoptysis [Chest Tightness] : no chest tightness [Sputum] : no sputum [Dyspnea] : no dyspnea [GERD] : no gerd [Abdominal Pain] : no abdominal pain [Nausea] : no nausea [Vomiting] : no vomiting [Dysphagia] : no dysphagia [Bleeding] : no bleeding [Rash] : no rash [Blood Transfusion] : no blood transfusion [Diabetes] : no diabetes [Obesity] : no obesity [TextBox_3] : in hospital     5 pound weight loss  [TextBox_44] : chest pressure    [TextBox_91] : multiple  complaints      spine back  [TextBox_110] : recent       4/4/   CTPA  + PE  [TextBox_141] : hypothyroid
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
## 2024-04-18 ENCOUNTER — APPOINTMENT (OUTPATIENT)
Dept: UROLOGY | Facility: CLINIC | Age: 54
End: 2024-04-18
Payer: MEDICARE

## 2024-04-18 ENCOUNTER — OUTPATIENT (OUTPATIENT)
Dept: OUTPATIENT SERVICES | Facility: HOSPITAL | Age: 54
LOS: 1 days | End: 2024-04-18
Payer: MEDICARE

## 2024-04-18 ENCOUNTER — APPOINTMENT (OUTPATIENT)
Dept: MRI IMAGING | Facility: CLINIC | Age: 54
End: 2024-04-18
Payer: MEDICARE

## 2024-04-18 DIAGNOSIS — M12.9 ARTHROPATHY, UNSPECIFIED: Chronic | ICD-10-CM

## 2024-04-18 DIAGNOSIS — M17.10 UNILATERAL PRIMARY OSTEOARTHRITIS, UNSPECIFIED KNEE: Chronic | ICD-10-CM

## 2024-04-18 DIAGNOSIS — R06.83 SNORING: Chronic | ICD-10-CM

## 2024-04-18 DIAGNOSIS — Z98.1 ARTHRODESIS STATUS: Chronic | ICD-10-CM

## 2024-04-18 DIAGNOSIS — C64.2 MALIGNANT NEOPLASM OF LEFT KIDNEY, EXCEPT RENAL PELVIS: ICD-10-CM

## 2024-04-18 PROCEDURE — A9585: CPT

## 2024-04-18 PROCEDURE — 74183 MRI ABD W/O CNTR FLWD CNTR: CPT | Mod: 26

## 2024-04-18 PROCEDURE — G2211 COMPLEX E/M VISIT ADD ON: CPT

## 2024-04-18 PROCEDURE — 74183 MRI ABD W/O CNTR FLWD CNTR: CPT

## 2024-04-18 PROCEDURE — 99213 OFFICE O/P EST LOW 20 MIN: CPT

## 2024-04-18 NOTE — PHYSICAL EXAM
[General Appearance - Well Developed] : well developed [General Appearance - Well Nourished] : well nourished [General Appearance - In No Acute Distress] : no acute distress [Abdomen Soft] : soft [Abdomen Tenderness] : non-tender [Costovertebral Angle Tenderness] : no ~M costovertebral angle tenderness [Edema] : no peripheral edema [Exaggerated Use Of Accessory Muscles For Inspiration] : no accessory muscle use [Oriented To Time, Place, And Person] : oriented to person, place, and time [Normal Station and Gait] : the gait and station were normal for the patient's age [FreeTextEntry1] : RUE arm
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
## 2024-04-18 NOTE — ASSESSMENT
[FreeTextEntry1] : Almost 5y s/p L lap partial for papillary RCC. labs normal. Low risk that ? PE is related to hx of RCC.  --MRI and chest x-ray now --RTC in 1y for continued surveillance --RTC in 1y with Dr Reynoso

## 2024-04-21 ENCOUNTER — NON-APPOINTMENT (OUTPATIENT)
Age: 54
End: 2024-04-21

## 2024-04-21 ENCOUNTER — EMERGENCY (EMERGENCY)
Facility: HOSPITAL | Age: 54
LOS: 1 days | Discharge: HOME | End: 2024-04-21
Attending: EMERGENCY MEDICINE
Payer: MEDICARE

## 2024-04-21 VITALS
HEART RATE: 61 BPM | HEIGHT: 70 IN | DIASTOLIC BLOOD PRESSURE: 91 MMHG | RESPIRATION RATE: 20 BRPM | OXYGEN SATURATION: 99 % | WEIGHT: 195.11 LBS | TEMPERATURE: 98 F | SYSTOLIC BLOOD PRESSURE: 131 MMHG

## 2024-04-21 VITALS
HEART RATE: 55 BPM | SYSTOLIC BLOOD PRESSURE: 133 MMHG | TEMPERATURE: 98 F | DIASTOLIC BLOOD PRESSURE: 85 MMHG | OXYGEN SATURATION: 100 % | RESPIRATION RATE: 18 BRPM

## 2024-04-21 DIAGNOSIS — R06.83 SNORING: Chronic | ICD-10-CM

## 2024-04-21 DIAGNOSIS — Z98.1 ARTHRODESIS STATUS: Chronic | ICD-10-CM

## 2024-04-21 DIAGNOSIS — M17.10 UNILATERAL PRIMARY OSTEOARTHRITIS, UNSPECIFIED KNEE: Chronic | ICD-10-CM

## 2024-04-21 DIAGNOSIS — M12.9 ARTHROPATHY, UNSPECIFIED: Chronic | ICD-10-CM

## 2024-04-21 LAB
ALBUMIN SERPL ELPH-MCNC: 4.3 G/DL — SIGNIFICANT CHANGE UP (ref 3.3–5)
ALP SERPL-CCNC: 98 U/L — SIGNIFICANT CHANGE UP (ref 40–120)
ALT FLD-CCNC: 18 U/L — SIGNIFICANT CHANGE UP (ref 10–45)
ANION GAP SERPL CALC-SCNC: 11 MMOL/L — SIGNIFICANT CHANGE UP (ref 5–17)
APTT BLD: 33.6 SEC — SIGNIFICANT CHANGE UP (ref 24.5–35.6)
AST SERPL-CCNC: 17 U/L — SIGNIFICANT CHANGE UP (ref 10–40)
BASE EXCESS BLDV CALC-SCNC: 1 MMOL/L — SIGNIFICANT CHANGE UP (ref -2–3)
BASOPHILS # BLD AUTO: 0.04 K/UL — SIGNIFICANT CHANGE UP (ref 0–0.2)
BASOPHILS NFR BLD AUTO: 0.5 % — SIGNIFICANT CHANGE UP (ref 0–2)
BILIRUB SERPL-MCNC: 0.8 MG/DL — SIGNIFICANT CHANGE UP (ref 0.2–1.2)
BUN SERPL-MCNC: 7 MG/DL — SIGNIFICANT CHANGE UP (ref 7–23)
CA-I SERPL-SCNC: 1.21 MMOL/L — SIGNIFICANT CHANGE UP (ref 1.15–1.33)
CALCIUM SERPL-MCNC: 9.2 MG/DL — SIGNIFICANT CHANGE UP (ref 8.4–10.5)
CHLORIDE BLDV-SCNC: 104 MMOL/L — SIGNIFICANT CHANGE UP (ref 96–108)
CHLORIDE SERPL-SCNC: 106 MMOL/L — SIGNIFICANT CHANGE UP (ref 96–108)
CO2 BLDV-SCNC: 29 MMOL/L — HIGH (ref 22–26)
CO2 SERPL-SCNC: 23 MMOL/L — SIGNIFICANT CHANGE UP (ref 22–31)
CREAT SERPL-MCNC: 0.86 MG/DL — SIGNIFICANT CHANGE UP (ref 0.5–1.3)
EGFR: 104 ML/MIN/1.73M2 — SIGNIFICANT CHANGE UP
EOSINOPHIL # BLD AUTO: 0.11 K/UL — SIGNIFICANT CHANGE UP (ref 0–0.5)
EOSINOPHIL NFR BLD AUTO: 1.4 % — SIGNIFICANT CHANGE UP (ref 0–6)
GAS PNL BLDV: 138 MMOL/L — SIGNIFICANT CHANGE UP (ref 136–145)
GAS PNL BLDV: SIGNIFICANT CHANGE UP
GAS PNL BLDV: SIGNIFICANT CHANGE UP
GLUCOSE BLDV-MCNC: 115 MG/DL — HIGH (ref 70–99)
GLUCOSE SERPL-MCNC: 115 MG/DL — HIGH (ref 70–99)
HCO3 BLDV-SCNC: 27 MMOL/L — SIGNIFICANT CHANGE UP (ref 22–29)
HCT VFR BLD CALC: 41.1 % — SIGNIFICANT CHANGE UP (ref 39–50)
HCT VFR BLDA CALC: 40 % — SIGNIFICANT CHANGE UP (ref 39–51)
HGB BLD CALC-MCNC: 13.4 G/DL — SIGNIFICANT CHANGE UP (ref 12.6–17.4)
HGB BLD-MCNC: 13.1 G/DL — SIGNIFICANT CHANGE UP (ref 13–17)
IMM GRANULOCYTES NFR BLD AUTO: 0.3 % — SIGNIFICANT CHANGE UP (ref 0–0.9)
INR BLD: 1.34 RATIO — HIGH (ref 0.85–1.18)
LACTATE BLDV-MCNC: 1.6 MMOL/L — SIGNIFICANT CHANGE UP (ref 0.5–2)
LIDOCAIN IGE QN: 31 U/L — SIGNIFICANT CHANGE UP (ref 7–60)
LYMPHOCYTES # BLD AUTO: 2.04 K/UL — SIGNIFICANT CHANGE UP (ref 1–3.3)
LYMPHOCYTES # BLD AUTO: 26 % — SIGNIFICANT CHANGE UP (ref 13–44)
MAGNESIUM SERPL-MCNC: 2.2 MG/DL — SIGNIFICANT CHANGE UP (ref 1.6–2.6)
MCHC RBC-ENTMCNC: 28.8 PG — SIGNIFICANT CHANGE UP (ref 27–34)
MCHC RBC-ENTMCNC: 31.9 GM/DL — LOW (ref 32–36)
MCV RBC AUTO: 90.3 FL — SIGNIFICANT CHANGE UP (ref 80–100)
MONOCYTES # BLD AUTO: 0.47 K/UL — SIGNIFICANT CHANGE UP (ref 0–0.9)
MONOCYTES NFR BLD AUTO: 6 % — SIGNIFICANT CHANGE UP (ref 2–14)
NEUTROPHILS # BLD AUTO: 5.16 K/UL — SIGNIFICANT CHANGE UP (ref 1.8–7.4)
NEUTROPHILS NFR BLD AUTO: 65.8 % — SIGNIFICANT CHANGE UP (ref 43–77)
NRBC # BLD: 0 /100 WBCS — SIGNIFICANT CHANGE UP (ref 0–0)
NT-PROBNP SERPL-SCNC: 65 PG/ML — SIGNIFICANT CHANGE UP (ref 0–300)
PCO2 BLDV: 48 MMHG — SIGNIFICANT CHANGE UP (ref 42–55)
PH BLDV: 7.36 — SIGNIFICANT CHANGE UP (ref 7.32–7.43)
PLATELET # BLD AUTO: 324 K/UL — SIGNIFICANT CHANGE UP (ref 150–400)
PO2 BLDV: 33 MMHG — SIGNIFICANT CHANGE UP (ref 25–45)
POTASSIUM BLDV-SCNC: 4.2 MMOL/L — SIGNIFICANT CHANGE UP (ref 3.5–5.1)
POTASSIUM SERPL-MCNC: 4.4 MMOL/L — SIGNIFICANT CHANGE UP (ref 3.5–5.3)
POTASSIUM SERPL-SCNC: 4.4 MMOL/L — SIGNIFICANT CHANGE UP (ref 3.5–5.3)
PROT SERPL-MCNC: 6.8 G/DL — SIGNIFICANT CHANGE UP (ref 6–8.3)
PROTHROM AB SERPL-ACNC: 14.6 SEC — HIGH (ref 9.5–13)
RBC # BLD: 4.55 M/UL — SIGNIFICANT CHANGE UP (ref 4.2–5.8)
RBC # FLD: 12.7 % — SIGNIFICANT CHANGE UP (ref 10.3–14.5)
SAO2 % BLDV: 54.6 % — LOW (ref 67–88)
SODIUM SERPL-SCNC: 140 MMOL/L — SIGNIFICANT CHANGE UP (ref 135–145)
T3 SERPL-MCNC: 103 NG/DL — SIGNIFICANT CHANGE UP (ref 80–200)
T4 AB SER-ACNC: 9.8 UG/DL — SIGNIFICANT CHANGE UP (ref 4.6–12)
TROPONIN T, HIGH SENSITIVITY RESULT: <6 NG/L — SIGNIFICANT CHANGE UP (ref 0–51)
TSH SERPL-MCNC: 0.93 UIU/ML — SIGNIFICANT CHANGE UP (ref 0.27–4.2)
WBC # BLD: 7.84 K/UL — SIGNIFICANT CHANGE UP (ref 3.8–10.5)
WBC # FLD AUTO: 7.84 K/UL — SIGNIFICANT CHANGE UP (ref 3.8–10.5)

## 2024-04-21 PROCEDURE — 71046 X-RAY EXAM CHEST 2 VIEWS: CPT

## 2024-04-21 PROCEDURE — 85025 COMPLETE CBC W/AUTO DIFF WBC: CPT

## 2024-04-21 PROCEDURE — 82435 ASSAY OF BLOOD CHLORIDE: CPT

## 2024-04-21 PROCEDURE — 85014 HEMATOCRIT: CPT

## 2024-04-21 PROCEDURE — 82330 ASSAY OF CALCIUM: CPT

## 2024-04-21 PROCEDURE — 83690 ASSAY OF LIPASE: CPT

## 2024-04-21 PROCEDURE — 36415 COLL VENOUS BLD VENIPUNCTURE: CPT

## 2024-04-21 PROCEDURE — 71275 CT ANGIOGRAPHY CHEST: CPT | Mod: MC

## 2024-04-21 PROCEDURE — 84484 ASSAY OF TROPONIN QUANT: CPT

## 2024-04-21 PROCEDURE — 84443 ASSAY THYROID STIM HORMONE: CPT

## 2024-04-21 PROCEDURE — 93970 EXTREMITY STUDY: CPT

## 2024-04-21 PROCEDURE — 84132 ASSAY OF SERUM POTASSIUM: CPT

## 2024-04-21 PROCEDURE — 93005 ELECTROCARDIOGRAM TRACING: CPT

## 2024-04-21 PROCEDURE — 93970 EXTREMITY STUDY: CPT | Mod: 26

## 2024-04-21 PROCEDURE — 84295 ASSAY OF SERUM SODIUM: CPT

## 2024-04-21 PROCEDURE — 96374 THER/PROPH/DIAG INJ IV PUSH: CPT

## 2024-04-21 PROCEDURE — 83735 ASSAY OF MAGNESIUM: CPT

## 2024-04-21 PROCEDURE — 99285 EMERGENCY DEPT VISIT HI MDM: CPT | Mod: 25

## 2024-04-21 PROCEDURE — 83880 ASSAY OF NATRIURETIC PEPTIDE: CPT

## 2024-04-21 PROCEDURE — 85018 HEMOGLOBIN: CPT

## 2024-04-21 PROCEDURE — 84436 ASSAY OF TOTAL THYROXINE: CPT

## 2024-04-21 PROCEDURE — 80053 COMPREHEN METABOLIC PANEL: CPT

## 2024-04-21 PROCEDURE — 82947 ASSAY GLUCOSE BLOOD QUANT: CPT

## 2024-04-21 PROCEDURE — 99285 EMERGENCY DEPT VISIT HI MDM: CPT

## 2024-04-21 PROCEDURE — 84480 ASSAY TRIIODOTHYRONINE (T3): CPT

## 2024-04-21 PROCEDURE — 85610 PROTHROMBIN TIME: CPT

## 2024-04-21 PROCEDURE — 71046 X-RAY EXAM CHEST 2 VIEWS: CPT | Mod: 26

## 2024-04-21 PROCEDURE — 85730 THROMBOPLASTIN TIME PARTIAL: CPT

## 2024-04-21 PROCEDURE — 96375 TX/PRO/DX INJ NEW DRUG ADDON: CPT

## 2024-04-21 PROCEDURE — 82803 BLOOD GASES ANY COMBINATION: CPT

## 2024-04-21 PROCEDURE — 83605 ASSAY OF LACTIC ACID: CPT

## 2024-04-21 RX ORDER — FAMOTIDINE 10 MG/ML
20 INJECTION INTRAVENOUS ONCE
Refills: 0 | Status: COMPLETED | OUTPATIENT
Start: 2024-04-21 | End: 2024-04-21

## 2024-04-21 RX ORDER — ONDANSETRON 8 MG/1
4 TABLET, FILM COATED ORAL ONCE
Refills: 0 | Status: COMPLETED | OUTPATIENT
Start: 2024-04-21 | End: 2024-04-21

## 2024-04-21 RX ADMIN — ONDANSETRON 4 MILLIGRAM(S): 8 TABLET, FILM COATED ORAL at 18:05

## 2024-04-21 RX ADMIN — Medication 30 MILLILITER(S): at 18:05

## 2024-04-21 RX ADMIN — FAMOTIDINE 20 MILLIGRAM(S): 10 INJECTION INTRAVENOUS at 18:06

## 2024-04-21 NOTE — ED ADULT NURSE NOTE - CHIEF COMPLAINT QUOTE
chest pain x4 days, taking xarelto since beginning of april for blood clots in the lungs, prescribed by [facility]. has not been feeling well since starting xarelto, wife states pt is debilitated. pt is alert and awake, breathing unlabored in triage.   had imaging done outpatient with [facility] recently, pulm wanted pt to come to [facility] ED because "they see no evidence of what NUM saw in the lungs and are very concerned".

## 2024-04-21 NOTE — ED PROVIDER NOTE - PATIENT PORTAL LINK FT
You can access the FollowMyHealth Patient Portal offered by [facility] by registering at the following website: [email or web address]. By joining [employer]’s FollowMyHealth portal, you will also be able to view your health information using other applications (apps) compatible with our system.
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
## 2024-04-21 NOTE — ED PROVIDER NOTE - PHYSICAL EXAMINATION
Const: Well-nourished, Well-developed, appearing stated age.  Eyes: no conjunctival injection, and symmetrical lids.  HEENT: Head NCAT, no lesions. Atraumatic external nose and ears. Moist MM.  Neck: Symmetric, trachea midline.   CVS: +S1/S2, Peripheral pulses 2+ and equal in all extremities.  RESP: Unlabored respiratory effort. Clear to auscultation bilaterally.  GI: mid epi pain  MSK: Normocephalic/Atraumatic, Lower Extremities w/o calf tenderness or edema b/l.   Skin: Warm, dry and intact.   Neuro: CNs II-XII grossly intact. Motor & Sensation grossly intact.  Psych: Awake, Alert, & Oriented (AAO) x3. Appropriate mood and affect. Const: Well-nourished, Well-developed, appearing stated age.  Eyes: no conjunctival injection, and symmetrical lids.  HEENT: Head NCAT, no lesions. Atraumatic external nose and ears. Moist MM.  Neck: Symmetric, trachea midline.   CVS: +S1/S2, Peripheral pulses 2+ and equal in all extremities.  RESP: Unlabored respiratory effort. Clear to auscultation bilaterally.  GI: mid epi pain  MSK: Normocephalic/Atraumatic, Lower Extremities w/o calf tenderness or edema b/l.   Skin: Warm, dry and intact. No bruising or erythema or edema of the lower extremities bilaterally.  Neuro: CNs II-XII grossly intact. Motor & Sensation grossly intact.  Psych: Awake, Alert, & Oriented (AAO) x3. Appropriate mood and affect.
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
## 2024-04-21 NOTE — ED ADULT TRIAGE NOTE - CHIEF COMPLAINT QUOTE
chest pain x4 days, taking xarelto since beginning of april for blood clots in the lungs, prescribed by [facility]. has not been feeling well since starting xarelto, wife states pt is debilitated. pt is alert and awake, breathing unlabored in triage.   had imaging done outpatient with [facility] recently, pulm wanted pt to come to [facility] ED because "they see no evidence of what NUM saw in the lungs and are very concerned".

## 2024-04-21 NOTE — ED PROVIDER NOTE - NSFOLLOWUPINSTRUCTIONS_ED_ALL_ED_FT
It was a pleasure caring for you today.   AS discussed, your CT scan and ultrasound did not show any blood clots today. We cannot stop any medications in the ED today without further studies.   Please call your pulmonologist or your primary care doctor in the morning for an urgent follow up in the next few days. Please take these papers with you to your visit as it has the results of your workup today.   If you experience any symptoms of chest pain, shortness of breath, dizziness, passing out, PLEASE COME BACK TO THE HOSPITAL.

## 2024-04-21 NOTE — ED ADULT NURSE REASSESSMENT NOTE - NS ED NURSE REASSESS COMMENT FT1
Report received from SHER Alvarez. Pt A&Ox4, IV intact and patent, VSS, pt denies pain/discomfort, bed locked and in lowest position, call bell within reach and pt instructed on use, safety and comfort measures maintained.
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
## 2024-04-21 NOTE — ED ADULT NURSE NOTE - NSFALLRISKINTERV_ED_ALL_ED
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
## 2024-04-22 ENCOUNTER — NON-APPOINTMENT (OUTPATIENT)
Age: 54
End: 2024-04-22

## 2024-04-24 ENCOUNTER — NON-APPOINTMENT (OUTPATIENT)
Age: 54
End: 2024-04-24

## 2024-04-24 ENCOUNTER — APPOINTMENT (OUTPATIENT)
Dept: ORTHOPEDIC SURGERY | Facility: CLINIC | Age: 54
End: 2024-04-24
Payer: MEDICARE

## 2024-04-24 DIAGNOSIS — M18.11 UNILATERAL PRIMARY OSTEOARTHRITIS OF FIRST CARPOMETACARPAL JOINT, RIGHT HAND: ICD-10-CM

## 2024-04-24 DIAGNOSIS — S63.637D SPRAIN OF INTERPHALANGEAL JOINT OF LEFT LITTLE FINGER, SUBSEQUENT ENCOUNTER: ICD-10-CM

## 2024-04-24 PROCEDURE — 99213 OFFICE O/P EST LOW 20 MIN: CPT | Mod: 25

## 2024-04-24 PROCEDURE — 73140 X-RAY EXAM OF FINGER(S): CPT | Mod: F4

## 2024-04-26 ENCOUNTER — APPOINTMENT (OUTPATIENT)
Dept: FAMILY MEDICINE | Facility: CLINIC | Age: 54
End: 2024-04-26
Payer: MEDICARE

## 2024-04-26 VITALS
OXYGEN SATURATION: 98 % | WEIGHT: 200 LBS | HEIGHT: 70 IN | SYSTOLIC BLOOD PRESSURE: 140 MMHG | TEMPERATURE: 97.9 F | DIASTOLIC BLOOD PRESSURE: 85 MMHG | HEART RATE: 73 BPM | BODY MASS INDEX: 28.63 KG/M2 | RESPIRATION RATE: 17 BRPM

## 2024-04-26 VITALS — DIASTOLIC BLOOD PRESSURE: 86 MMHG | SYSTOLIC BLOOD PRESSURE: 136 MMHG

## 2024-04-26 DIAGNOSIS — I10 ESSENTIAL (PRIMARY) HYPERTENSION: ICD-10-CM

## 2024-04-26 DIAGNOSIS — C64.2 MALIGNANT NEOPLASM OF LEFT KIDNEY, EXCEPT RENAL PELVIS: ICD-10-CM

## 2024-04-26 DIAGNOSIS — R07.89 OTHER CHEST PAIN: ICD-10-CM

## 2024-04-26 DIAGNOSIS — R06.02 SHORTNESS OF BREATH: ICD-10-CM

## 2024-04-26 PROCEDURE — 99215 OFFICE O/P EST HI 40 MIN: CPT

## 2024-04-26 NOTE — PHYSICAL EXAM
[No Acute Distress] : no acute distress [Well Nourished] : well nourished [Well Developed] : well developed [Well-Appearing] : well-appearing [Normal Sclera/Conjunctiva] : normal sclera/conjunctiva [PERRL] : pupils equal round and reactive to light [EOMI] : extraocular movements intact [Normal Outer Ear/Nose] : the outer ears and nose were normal in appearance [Normal Oropharynx] : the oropharynx was normal [No JVD] : no jugular venous distention [No Lymphadenopathy] : no lymphadenopathy [Supple] : supple [Thyroid Normal, No Nodules] : the thyroid was normal and there were no nodules present [No Respiratory Distress] : no respiratory distress  [No Accessory Muscle Use] : no accessory muscle use [Clear to Auscultation] : lungs were clear to auscultation bilaterally [Normal Rate] : normal rate  [Regular Rhythm] : with a regular rhythm [Normal S1, S2] : normal S1 and S2 [No Murmur] : no murmur heard [Pedal Pulses Present] : the pedal pulses are present [Normal Posterior Cervical Nodes] : no posterior cervical lymphadenopathy [Normal Anterior Cervical Nodes] : no anterior cervical lymphadenopathy [No Rash] : no rash [Coordination Grossly Intact] : coordination grossly intact [No Focal Deficits] : no focal deficits [Normal Gait] : normal gait [Deep Tendon Reflexes (DTR)] : deep tendon reflexes were 2+ and symmetric [Normal Affect] : the affect was normal [Normal Insight/Judgement] : insight and judgment were intact [de-identified] : T SLO brace noted.  [de-identified] : Ambulating with a cane.  Patient wearing thumb spica splint

## 2024-04-26 NOTE — ASSESSMENT
[FreeTextEntry1] : Symptoms of chest pain, and shortness of breath have improved.  Patient reports that he is finally starting to feel better. Downloaded written report from outside hospital CTA which was read as at least 3 pulmonary embolisms. Patient to follow-up with pulmonology to further evaluate. Patient very adamant that he would not want to restart anticoagulant due to the belief that he was suffering from an allergic reaction.  Patient aware that general recommendation is to be on a blood thinner for at least 3 to 6 months from onset of initial clotting.  It is also not 100% clear with the initial insult of pulmonary insults were.    Did review hospital notes labs which were all unremarkable.  TFTs were unremarkable. Did review previous pulmonology note. Agree with pulmonologist that he would benefit from hematologic evaluation to rule out hypercoagulable state and to rediscuss potential anticoagulant therapy (was called by [facility] hematology group that they felt that it was unnecessary for patient to have hematological consult, due to the fact that follow-up CT scan was negative for any PE.  Asked hematology to document their recommendations of no hematological workup) Since initial scan was positive, which was 1 month prior it would be prudent for patient to have hematologic workup especially with remote history of renal CA  Currently patient is agreeable to taking aspirin.  He reports that he is taking 325 about twice a day.  Likely can titrate down to 81 mg.  Patient aware of increased risk of redeveloping possible PE.  If any chest pain, shortness of breath, palpitations, or shortness of breath on exertion-patient will report back to ED
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
## 2024-04-29 PROBLEM — S80.12XS LEG HEMATOMA, LEFT, SEQUELA: Status: ACTIVE | Noted: 2024-04-29

## 2024-04-29 NOTE — REVIEW OF SYSTEMS
[Shortness Of Breath] : shortness of breath [Joint Pain] : joint pain [Back Pain] : back pain [Joint Swelling] : joint swelling [Negative] : Heme/Lymph [de-identified] : Ecchymosis

## 2024-04-29 NOTE — PHYSICAL EXAM
[No Acute Distress] : no acute distress [Well Nourished] : well nourished [Well Developed] : well developed [Well-Appearing] : well-appearing [Normal Sclera/Conjunctiva] : normal sclera/conjunctiva [PERRL] : pupils equal round and reactive to light [EOMI] : extraocular movements intact [Normal Outer Ear/Nose] : the outer ears and nose were normal in appearance [Normal Oropharynx] : the oropharynx was normal [No JVD] : no jugular venous distention [No Lymphadenopathy] : no lymphadenopathy [Supple] : supple [Thyroid Normal, No Nodules] : the thyroid was normal and there were no nodules present [No Respiratory Distress] : no respiratory distress  [No Accessory Muscle Use] : no accessory muscle use [Clear to Auscultation] : lungs were clear to auscultation bilaterally [Normal Rate] : normal rate  [Regular Rhythm] : with a regular rhythm [Normal S1, S2] : normal S1 and S2 [No Murmur] : no murmur heard [No Carotid Bruits] : no carotid bruits [No Abdominal Bruit] : a ~M bruit was not heard ~T in the abdomen [No Varicosities] : no varicosities [Pedal Pulses Present] : the pedal pulses are present [No Edema] : there was no peripheral edema [No Palpable Aorta] : no palpable aorta [No Extremity Clubbing/Cyanosis] : no extremity clubbing/cyanosis [Soft] : abdomen soft [Non Tender] : non-tender [Non-distended] : non-distended [No Masses] : no abdominal mass palpated [No HSM] : no HSM [Normal Bowel Sounds] : normal bowel sounds [Normal Posterior Cervical Nodes] : no posterior cervical lymphadenopathy [Normal Anterior Cervical Nodes] : no anterior cervical lymphadenopathy [No CVA Tenderness] : no CVA  tenderness [No Spinal Tenderness] : no spinal tenderness [No Joint Swelling] : no joint swelling [Grossly Normal Strength/Tone] : grossly normal strength/tone [No Rash] : no rash [Coordination Grossly Intact] : coordination grossly intact [No Focal Deficits] : no focal deficits [Normal Gait] : normal gait [Deep Tendon Reflexes (DTR)] : deep tendon reflexes were 2+ and symmetric [Normal Affect] : the affect was normal [Normal Insight/Judgement] : insight and judgment were intact [de-identified] : Significant indurated fading large hematoma noted on the left lateral aspect of leg.  Moderate swelling of right wrist.  Currently does not have splint [de-identified] : Patient appears very paranoid, has a flight of ideas, hard for patient to refocus [08065 - High Complexity requires an extensive number of possible diagnoses and/or the management options, extensive complexity of the medical data (tests, etc.) to be reviewed, and a high risk of significant complications, morbidity, and/or mortality as w] : High Complexity
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
## 2024-04-30 ENCOUNTER — APPOINTMENT (OUTPATIENT)
Dept: FAMILY MEDICINE | Facility: CLINIC | Age: 54
End: 2024-04-30

## 2024-04-30 VITALS
HEIGHT: 70 IN | BODY MASS INDEX: 28.63 KG/M2 | WEIGHT: 200 LBS | OXYGEN SATURATION: 97 % | SYSTOLIC BLOOD PRESSURE: 132 MMHG | HEART RATE: 75 BPM | RESPIRATION RATE: 17 BRPM | DIASTOLIC BLOOD PRESSURE: 72 MMHG | TEMPERATURE: 97.5 F

## 2024-04-30 DIAGNOSIS — M54.9 CERVICALGIA: ICD-10-CM

## 2024-04-30 DIAGNOSIS — R42 DIZZINESS AND GIDDINESS: ICD-10-CM

## 2024-04-30 DIAGNOSIS — M54.2 CERVICALGIA: ICD-10-CM

## 2024-04-30 DIAGNOSIS — I26.99 OTHER PULMONARY EMBOLISM W/OUT ACUTE COR PULMONALE: ICD-10-CM

## 2024-04-30 DIAGNOSIS — G89.29 CERVICALGIA: ICD-10-CM

## 2024-04-30 DIAGNOSIS — R53.83 OTHER FATIGUE: ICD-10-CM

## 2024-04-30 PROCEDURE — 99215 OFFICE O/P EST HI 40 MIN: CPT

## 2024-04-30 PROCEDURE — 36415 COLL VENOUS BLD VENIPUNCTURE: CPT

## 2024-04-30 RX ORDER — ALPRAZOLAM 2 MG/1
2 TABLET ORAL
Qty: 60 | Refills: 0 | Status: DISCONTINUED | COMMUNITY
Start: 2019-03-25 | End: 2024-04-30

## 2024-05-01 NOTE — PHYSICAL EXAM
[No Acute Distress] : no acute distress [Well Nourished] : well nourished [Well Developed] : well developed [Well-Appearing] : well-appearing [Normal Sclera/Conjunctiva] : normal sclera/conjunctiva [PERRL] : pupils equal round and reactive to light [EOMI] : extraocular movements intact [Normal Outer Ear/Nose] : the outer ears and nose were normal in appearance [Normal Oropharynx] : the oropharynx was normal [No JVD] : no jugular venous distention [No Lymphadenopathy] : no lymphadenopathy [Supple] : supple [Thyroid Normal, No Nodules] : the thyroid was normal and there were no nodules present [No Respiratory Distress] : no respiratory distress  [No Accessory Muscle Use] : no accessory muscle use [Clear to Auscultation] : lungs were clear to auscultation bilaterally [Normal Rate] : normal rate  [Regular Rhythm] : with a regular rhythm [Normal S1, S2] : normal S1 and S2 [No Murmur] : no murmur heard [Pedal Pulses Present] : the pedal pulses are present [Normal Posterior Cervical Nodes] : no posterior cervical lymphadenopathy [Normal Anterior Cervical Nodes] : no anterior cervical lymphadenopathy [No Rash] : no rash [Coordination Grossly Intact] : coordination grossly intact [No Focal Deficits] : no focal deficits [Normal Gait] : normal gait [Deep Tendon Reflexes (DTR)] : deep tendon reflexes were 2+ and symmetric [Normal Affect] : the affect was normal [Normal Insight/Judgement] : insight and judgment were intact [de-identified] : T SLO brace noted.  [de-identified] : Ambulating with a cane.  Patient wearing thumb spica splint [de-identified] : anxious

## 2024-05-01 NOTE — ASSESSMENT
[FreeTextEntry1] : Patient to follow-up with pulmonology to further evaluate diagnosis of P.E at [facility] and negative CT chest done this month at [facility].  Patient states he would not want to restart anticoagulant due to the belief that he was suffering from an allergic reaction.  Did review hospital notes labs which were all unremarkable.   Did review previous pulmonology note. Agree with pulmonologist that he would benefit from hematologic evaluation to rule out hypercoagulable state and to rediscuss potential anticoagulant therapy. Currently patient is agreeable to taking aspirin.  He reports that he is Aleve PM currently.  If any chest pain, shortness of breath, palpitations, or shortness of breath on exertion-patient will report back to ED. PT eval. to address need for assistive device. Patient asking for wheelchair. He is using cane and is wearing lumbar belt.
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
## 2024-05-01 NOTE — HEALTH RISK ASSESSMENT
[No] : In the past 12 months have you used drugs other than those required for medical reasons? No [No falls in past year] : Patient reported no falls in the past year [0] : 2) Feeling down, depressed, or hopeless: Not at all (0) [PHQ-2 Negative - No further assessment needed] : PHQ-2 Negative - No further assessment needed [Never] : Never [de-identified] : limited due to low back pain  [de-identified] : regular [[id number]] : 0

## 2024-05-03 LAB
ALBUMIN SERPL ELPH-MCNC: 4.4 G/DL
ALP BLD-CCNC: 96 U/L
ALT SERPL-CCNC: 20 U/L
AMYLASE/CREAT SERPL: 64 U/L
AST SERPL-CCNC: 20 U/L
BILIRUB DIRECT SERPL-MCNC: 0.2 MG/DL
BILIRUB INDIRECT SERPL-MCNC: 0.8 MG/DL
BILIRUB SERPL-MCNC: 1 MG/DL
GGT SERPL-CCNC: 21 U/L
HAPTOGLOB SERPL-MCNC: 212 MG/DL
LDH SERPL-CCNC: 183 U/L
LPL SERPL-CCNC: 29 U/L
PROT SERPL-MCNC: 6.7 G/DL
RBC # BLD: 4.69 M/UL
RETICS # AUTO: 1 %
RETICS AGGREG/RBC NFR: 45.5 K/UL

## 2024-05-07 ENCOUNTER — APPOINTMENT (OUTPATIENT)
Dept: PULMONOLOGY | Facility: CLINIC | Age: 54
End: 2024-05-07
Payer: MEDICARE

## 2024-05-07 VITALS
HEART RATE: 63 BPM | DIASTOLIC BLOOD PRESSURE: 80 MMHG | OXYGEN SATURATION: 98 % | SYSTOLIC BLOOD PRESSURE: 122 MMHG | RESPIRATION RATE: 16 BRPM

## 2024-05-07 DIAGNOSIS — R07.89 OTHER CHEST PAIN: ICD-10-CM

## 2024-05-07 DIAGNOSIS — J45.909 UNSPECIFIED ASTHMA, UNCOMPLICATED: ICD-10-CM

## 2024-05-07 PROCEDURE — 99214 OFFICE O/P EST MOD 30 MIN: CPT

## 2024-05-08 PROBLEM — J45.909 ASTHMA: Status: ACTIVE | Noted: 2019-12-13

## 2024-05-08 NOTE — REVIEW OF SYSTEMS
[Recent Wt Loss (___ Lbs)] : ~T recent [unfilled] lb weight loss [Wheezing] : wheezing [SOB on Exertion] : sob on exertion [Chest Discomfort] : chest discomfort [Back Pain] : back pain [Chronic Pain] : chronic pain [Clotting Disorder/ Frequent bleeding] : clotting disorder/ frequent bleeding [Thyroid Problem] : thyroid problem [Negative] : Endocrine [Fever] : no fever [Fatigue] : no fatigue [Recent Wt Gain (___ Lbs)] : ~T no recent weight gain [Chills] : no chills [Poor Appetite] : no poor appetite [Epistaxis] : no epistaxis [Sore Throat] : no sore throat [Nasal Congestion] : no nasal congestion [Postnasal Drip] : no postnasal drip [Dry Mouth] : no dry mouth [Sinus Problems] : no sinus problems [Cough] : no cough [Hemoptysis] : no hemoptysis [Chest Tightness] : no chest tightness [Sputum] : no sputum [Dyspnea] : no dyspnea [GERD] : no gerd [Abdominal Pain] : no abdominal pain [Nausea] : no nausea [Vomiting] : no vomiting [Dysphagia] : no dysphagia [Bleeding] : no bleeding [Rash] : no rash [Blood Transfusion] : no blood transfusion [Diabetes] : no diabetes [Obesity] : no obesity [TextBox_3] : in hospital     5 pound weight loss  [TextBox_44] : chest pressure    [TextBox_91] : multiple  complaints      spine back  [TextBox_110] : recent       4/4/   CTPA  + PE  [TextBox_141] : hypothyroid

## 2024-05-08 NOTE — REASON FOR VISIT
[Consultation] : a consultation [Asthma] : asthma [Pulmonary Embolism] : pulmonary embolism [Shortness of Breath] : shortness of breath [TextBox_44] : Atypical chest pain.

## 2024-05-08 NOTE — DISCUSSION/SUMMARY
[FreeTextEntry1] : Reviewed multiple CAT scan angiograms.  Question of pulmonary embolic disease on initial CT but no evidence on 2 subsequent CAT scan angiograms.  Patient not tolerating and refusing anticoagulation.  Clinically stable. History of mild intermittent asthma.  Difficult to assess at this time secondary to discomfort.  No active wheezing on physical exam.  No significant cough or congestive symptoms. Cervical spine issues needs orthopedic follow-up.

## 2024-05-08 NOTE — PHYSICAL EXAM
[No Acute Distress] : no acute distress [Normal Oropharynx] : normal oropharynx [III] : Mallampati Class: III [Normal Appearance] : normal appearance [Supple] : supple [No Neck Mass] : no neck mass [No JVD] : no jvd [Normal Rate/Rhythm] : normal rate/rhythm [Normal S1, S2] : normal s1, s2 [No Murmurs] : no murmurs [No Resp Distress] : no resp distress [No Acc Muscle Use] : no acc muscle use [Clear to Auscultation Bilaterally] : clear to auscultation bilaterally [Normal to Percussion] : normal to percussion [Benign] : benign [Not Tender] : not tender [No Masses] : no masses [Soft] : soft [No HSM] : no hsm [No Hernias] : no hernias [Normal Bowel Sounds] : normal bowel sounds [Normal Gait] : normal gait [No Clubbing] : no clubbing [No Cyanosis] : no cyanosis [No Edema] : no edema [Normal Color/ Pigmentation] : normal color/ pigmentation [No Focal Deficits] : no focal deficits [Oriented x3] : oriented x3 [Normal Affect] : normal affect [TextBox_2] : pleasant male  alert conversant no cough  [TextBox_11] : moist no lesions  [TextBox_80] : Pain on palpation right posterior chest wall approximately 10th rib.  No skin lesions.  No ecchymosis. [TextBox_99] : back --  well healed  lumbar scar  he points there to area of radiating   pain chronic , slow gait  [TextBox_125] : no  ecchymosis  no deformity noted     no bleeding
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
## 2024-05-08 NOTE — ASSESSMENT
[FreeTextEntry1] : Observation. Call or follow-up if recurrent chest discomfort or shortness of breath. PRN Ventolin All discussed with patient and wife.  35-minute spent evaluation management review of studies and discussion.

## 2024-05-08 NOTE — PROCEDURE
[FreeTextEntry1] : CAT scan April 4, 2024 [facility].  1 / 1 Nicholas tSrong  Report date:4/18/2024 View Order (Report matches exam selected in Patient History pane)        EXAM: [id number] - CT ANGIO CHEST PULM ART [facility] - ORDERED BY: MIRIAN ARROYO   PROCEDURE DATE: 04/17/2024    INTERPRETATION: HISTORY: Ordering Dxs: I26.99 Other pulmonary embolism without acute cor pulmonale /// Admitting Dxs: I26.99 OTHER PULMONARY EMBOLISM WITHOUT ACUTE COR PULMONALE  EXAMINATION: CTA CHEST was performed for evaluation of the pulmonary arteries. Multiplanar reformatted images and MIPS were acquired. CONTRAST/COMPLICATIONS: IV Contrast: Omnipaque 350 90 cc administered 10 cc discarded Oral Contrast: NONE Complications: None reported at time of study completion  COMPARISON: 3/12/2024 CT chest.  FINDINGS:  PULMONARY ARTERIES: No pulmonary embolism.  MEDIASTINUM: Normal heart size. No pericardial effusion. Thoracic aorta normal caliber. No large mediastinal lymph nodes.  AIRWAYS, LUNGS, PLEURA: Clear central airways. Right middle lobe 0.3 cm nodule (image 264, series 6) is unchanged compared to 6/29/2023. No pleural effusion.  IMAGED ABDOMEN: Partial left nephrectomy. Bilateral renal hypodense lesions unchanged. Left adrenal myelolipoma.  SOFT TISSUES: Bilateral gynecomastia.  BONES: Lumbar spine postoperative changes.   IMPRESSION:.  No pulmonary embolism.  --- End of Report ---       NICHOLAS STRONG MD; Attending Radiologist This document has been electronically signed. Apr 18 2024 9:04AM  1 / 2 Ashutosh Varghese  Report date:4/23/2024 View Order (Report matches exam selected in Patient History pane)     ACC: [id number] EXAM: CT ANGIO CHEST PULM ART [facility] ORDERED BY: CARLITOS CHU  *** ADDENDUM # 1 ***  CTA chest study from [facility] performed on 4/4/2024 was made available for comparison. The images from the outside hospital were reviewed, and appears that evaluation of the subsegmental pulmonary artery tree may have been limited by motion artifact. Nevertheless, upon rereview of the current study performed on 4/21/2024, no PE is appreciated.  --- End of Report ---  *** END OF ADDENDUM # 1 ***   PROCEDURE DATE: 04/21/2024    INTERPRETATION: CLINICAL INFORMATION: Chest pain. Evaluate for pulmonary embolism  COMPARISON: CTA chest 4/17/2024 and MR abdomen 4/2/2019.  CONTRAST/COMPLICATIONS: IV Contrast: Omnipaque 350 60 cc administered 40 cc discarded Oral Contrast: NONE Complications: None reported at time of study completion  PROCEDURE: CT Angiography of the Chest. Sagittal and coronal reformats were performed as well as 3D (MIP) reconstructions.  FINDINGS:  LUNGS AND AIRWAYS: Patent central airways. No consolidation. Stable right middle lobe nodule measuring 3 mm (series 301 image 56). Few scattered triangular-shaped fissural nodes bilaterally, likely benign intrapulmonary lymph nodes. PLEURA: No pleural effusion. MEDIASTINUM AND GREGORIO: No lymphadenopathy. VESSELS: No pulmonary embolism in main, lobar, segmental or subsegmental pulmonary arteries. HEART: Heart size is normal. No pericardial effusion. CHEST WALL AND LOWER NECK: Bilateral gynecomastia. VISUALIZED UPPER ABDOMEN: Indeterminate right renal interpolar 2.2 cm hypodensity, possibly complex cyst, unchanged. Partial left nephrectomy. Left adrenal myelolipoma. Nonspecific right adrenal thickening. BONES: Degenerative changes. Old healed right rib fractures.  IMPRESSION: No pulmonary embolism or other acute intrathoracic pathology.  --- End of Report ---  ***Please see the addendum at the top of this report. It may contain additional important information or changes.****   PRAKASH ENCINAS MD; Resident Radiologist This document has been electronically signed. ASHUTOSH VARGHESE MD; Attending Radiologist This document has been electronically signed. Apr 21 2024 10:35PM 1st Addendum: ASHUTOSH VARGHESE MD; Attending Radiologist The first addendum was electronically signed on: Apr 23 2024 12:09AM.

## 2024-05-09 ENCOUNTER — NON-APPOINTMENT (OUTPATIENT)
Age: 54
End: 2024-05-09

## 2024-06-07 ENCOUNTER — RX RENEWAL (OUTPATIENT)
Age: 54
End: 2024-06-07

## 2024-06-07 RX ORDER — METOPROLOL SUCCINATE 25 MG/1
25 TABLET, EXTENDED RELEASE ORAL
Qty: 180 | Refills: 1 | Status: ACTIVE | COMMUNITY
Start: 2017-09-05 | End: 1900-01-01

## 2024-07-17 ENCOUNTER — APPOINTMENT (OUTPATIENT)
Dept: PULMONOLOGY | Facility: CLINIC | Age: 54
End: 2024-07-17

## 2024-09-18 ENCOUNTER — APPOINTMENT (OUTPATIENT)
Dept: FAMILY MEDICINE | Facility: CLINIC | Age: 54
End: 2024-09-18
Payer: MEDICARE

## 2024-09-18 VITALS
SYSTOLIC BLOOD PRESSURE: 120 MMHG | TEMPERATURE: 97.6 F | DIASTOLIC BLOOD PRESSURE: 82 MMHG | BODY MASS INDEX: 29.06 KG/M2 | RESPIRATION RATE: 17 BRPM | WEIGHT: 203 LBS | HEIGHT: 70 IN | OXYGEN SATURATION: 97 % | HEART RATE: 66 BPM

## 2024-09-18 DIAGNOSIS — H53.8 OTHER VISUAL DISTURBANCES: ICD-10-CM

## 2024-09-18 DIAGNOSIS — I10 ESSENTIAL (PRIMARY) HYPERTENSION: ICD-10-CM

## 2024-09-18 DIAGNOSIS — Z91.09 OTHER ALLERGY STATUS, OTHER THAN TO DRUGS AND BIOLOGICAL SUBSTANCES: ICD-10-CM

## 2024-09-18 DIAGNOSIS — Z87.898 PERSONAL HISTORY OF OTHER SPECIFIED CONDITIONS: ICD-10-CM

## 2024-09-18 DIAGNOSIS — Z12.11 ENCOUNTER FOR SCREENING FOR MALIGNANT NEOPLASM OF COLON: ICD-10-CM

## 2024-09-18 DIAGNOSIS — E03.9 HYPOTHYROIDISM, UNSPECIFIED: ICD-10-CM

## 2024-09-18 DIAGNOSIS — R53.83 OTHER FATIGUE: ICD-10-CM

## 2024-09-18 DIAGNOSIS — J45.909 UNSPECIFIED ASTHMA, UNCOMPLICATED: ICD-10-CM

## 2024-09-18 DIAGNOSIS — Z86.69 PERSONAL HISTORY OF OTHER DISEASES OF THE NERVOUS SYSTEM AND SENSE ORGANS: ICD-10-CM

## 2024-09-18 PROCEDURE — 96372 THER/PROPH/DIAG INJ SC/IM: CPT

## 2024-09-18 PROCEDURE — 99214 OFFICE O/P EST MOD 30 MIN: CPT | Mod: 25

## 2024-09-18 RX ORDER — CYANOCOBALAMIN 1000 UG/ML
1000 INJECTION INTRAMUSCULAR; SUBCUTANEOUS
Qty: 0 | Refills: 0 | Status: COMPLETED | OUTPATIENT
Start: 2024-09-18

## 2024-09-18 RX ORDER — FEXOFENADINE HCL 60 MG
CAPSULE ORAL
Refills: 0 | Status: ACTIVE | COMMUNITY

## 2024-09-18 RX ORDER — LEVOCETIRIZINE DIHYDROCHLORIDE 5 MG/1
5 TABLET ORAL
Qty: 30 | Refills: 3 | Status: ACTIVE | COMMUNITY
Start: 2024-09-18 | End: 1900-01-01

## 2024-09-18 RX ADMIN — CYANOCOBALAMIN 0 MCG/ML: 1000 INJECTION INTRAMUSCULAR; SUBCUTANEOUS at 00:00

## 2024-09-18 NOTE — HISTORY OF PRESENT ILLNESS
[FreeTextEntry1] : see HPI [de-identified] : Here to address HTN, allergies, asthma, Hypothyroidism. He feels tired and is requesting B12 injection. His last b12 level was normal but he states he was taking B12. He needs refill for Vit. d deficiency. labs reviewed. he uses cane for ambulation.

## 2024-09-18 NOTE — HEALTH RISK ASSESSMENT
[No] : In the past 12 months have you used drugs other than those required for medical reasons? No [No falls in past year] : Patient reported no falls in the past year [0] : 2) Feeling down, depressed, or hopeless: Not at all (0) [PHQ-2 Negative - No further assessment needed] : PHQ-2 Negative - No further assessment needed [de-identified] : walking  [de-identified] : regular [[id number]] : 0 [AdvancecareDate] : 09/24 [Never] : Never

## 2024-09-18 NOTE — ASSESSMENT
[FreeTextEntry1] : GI referral.  MVI.  Vit. D supplement, allergy medicine.  B12 IM given per pt. request.

## 2024-09-19 NOTE — HISTORY OF PRESENT ILLNESS
77 [FreeTextEntry8] : c/o fatigue. he has h/o vitamin D deficiency. \par he had nose bleed spontaneously last week. he saw ENT last time when this happened when he was under stress. also had spontaneous subconj. bleed. no blurred vision. \par He has GERD and Dexilant is the only PPI is working. \par he has anxiety and takes Xanax prescribed prn by dr. Villalobos. \par he has HTN and is taking his medicines. \par He has asthma ,well controlled. he is requesting inhaler. he got thrush from maintenance steroid inhaler. \par he is taking Synthroid for hypothyroidism.

## 2024-09-20 PROBLEM — H53.8 BLURRED VISION: Status: ACTIVE | Noted: 2024-09-20

## 2024-09-20 PROBLEM — Z86.69 HISTORY OF BLURRED VISION: Status: RESOLVED | Noted: 2023-09-14 | Resolved: 2024-09-20

## 2024-10-01 ENCOUNTER — APPOINTMENT (OUTPATIENT)
Dept: CT IMAGING | Facility: CLINIC | Age: 54
End: 2024-10-01
Payer: MEDICARE

## 2024-10-01 ENCOUNTER — OUTPATIENT (OUTPATIENT)
Dept: OUTPATIENT SERVICES | Facility: HOSPITAL | Age: 54
LOS: 1 days | End: 2024-10-01
Payer: MEDICARE

## 2024-10-01 DIAGNOSIS — Z00.8 ENCOUNTER FOR OTHER GENERAL EXAMINATION: ICD-10-CM

## 2024-10-01 DIAGNOSIS — M12.9 ARTHROPATHY, UNSPECIFIED: Chronic | ICD-10-CM

## 2024-10-01 DIAGNOSIS — Z98.1 ARTHRODESIS STATUS: Chronic | ICD-10-CM

## 2024-10-01 DIAGNOSIS — R06.83 SNORING: Chronic | ICD-10-CM

## 2024-10-01 DIAGNOSIS — M17.10 UNILATERAL PRIMARY OSTEOARTHRITIS, UNSPECIFIED KNEE: Chronic | ICD-10-CM

## 2024-10-01 PROCEDURE — 74178 CT ABD&PLV WO CNTR FLWD CNTR: CPT

## 2024-10-01 PROCEDURE — 74178 CT ABD&PLV WO CNTR FLWD CNTR: CPT | Mod: 26

## 2024-10-01 PROCEDURE — 71260 CT THORAX DX C+: CPT

## 2024-10-01 PROCEDURE — 71260 CT THORAX DX C+: CPT | Mod: 26

## 2024-10-17 ENCOUNTER — EMERGENCY (EMERGENCY)
Facility: HOSPITAL | Age: 54
LOS: 1 days | Discharge: HOME | End: 2024-10-17
Attending: EMERGENCY MEDICINE | Admitting: EMERGENCY MEDICINE
Payer: MEDICARE

## 2024-10-17 VITALS
RESPIRATION RATE: 18 BRPM | OXYGEN SATURATION: 98 % | HEIGHT: 70 IN | SYSTOLIC BLOOD PRESSURE: 146 MMHG | TEMPERATURE: 98 F | HEART RATE: 97 BPM | WEIGHT: 213.85 LBS | DIASTOLIC BLOOD PRESSURE: 103 MMHG

## 2024-10-17 DIAGNOSIS — R06.83 SNORING: Chronic | ICD-10-CM

## 2024-10-17 DIAGNOSIS — Z98.1 ARTHRODESIS STATUS: Chronic | ICD-10-CM

## 2024-10-17 DIAGNOSIS — M12.9 ARTHROPATHY, UNSPECIFIED: Chronic | ICD-10-CM

## 2024-10-17 DIAGNOSIS — M17.10 UNILATERAL PRIMARY OSTEOARTHRITIS, UNSPECIFIED KNEE: Chronic | ICD-10-CM

## 2024-10-17 PROCEDURE — 99284 EMERGENCY DEPT VISIT MOD MDM: CPT
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
## 2024-10-17 NOTE — ED ADULT NURSE NOTE - NSFALLUNIVINTERV_ED_ALL_ED
Bed/Stretcher in lowest position, wheels locked, appropriate side rails in place/Call bell, personal items and telephone in reach/Instruct patient to call for assistance before getting out of bed/chair/stretcher/Non-slip footwear applied when patient is off stretcher/[place] to call system/Physically safe environment - no spills, clutter or unnecessary equipment/Purposeful proactive rounding/Room/bathroom lighting operational, light cord in reach
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
## 2024-10-17 NOTE — ED ADULT NURSE NOTE - PERIPHERAL VASCULAR
Visit Information Date & Time Provider Department Dept. Phone Encounter #  
 1/19/2017 12:30 PM Matilde Anderson MD [facility] [phone] [id number] Follow-up Instructions Return in about 4 months (around 5/19/2017). Your Appointments 4/20/2017  9:15 AM  
FOLLOW UP EXAM with Matilde Anderson MD  
[facility] [address]) Appt Note: 3 month f/u  
 [address] [facility] [id number]  
[facility] [id number] Upcoming Health Maintenance Date Due DTaP/Tdap/Td series (1 - Tdap) 7/5/2001 PAP AKA CERVICAL CYTOLOGY 7/5/2001 INFLUENZA AGE 9 TO ADULT 8/1/2016 Allergies as of 1/19/2017  Review Complete On: 1/19/2017 By: Matilde Anderson MD  
  
 Severity Noted Reaction Type Reactions Tylenol-codeine #3 [Acetaminophen-codeine]  01/19/2017    Rash Vicodin [Hydrocodone-acetaminophen]  01/19/2017    Rash Current Immunizations  Never Reviewed No immunizations on file. Not reviewed this visit You Were Diagnosed With   
  
 Codes Comments Perennial allergic rhinitis, unspecified allergic rhinitis trigger    -  Primary ICD-10-CM: J30.89 ICD-9-CM: 477.8 Vitals BP Pulse Temp Resp Height(growth percentile) Weight(growth percentile) 110/74 (BP 1 Location: Right arm, BP Patient Position: Sitting) 91 98.2 °F (36.8 °C) (Oral) 18 5' 3\" (1.6 m) 158 lb 9.6 oz (71.9 kg) LMP SpO2 BMI OB Status Smoking Status 01/07/2017 (Exact Date) 98% 28.09 kg/m2 Having regular periods Current Every Day Smoker BMI and BSA Data Body Mass Index Body Surface Area 28.09 kg/m 2 1.79 m 2 Preferred Pharmacy Pharmacy Name Phone [facility] [address] [place], [place] [address] [address] [place] [address] [phone] Your Updated Medication List  
  
   
This list is accurate as of: 1/19/17 12:58 PM.  Always use your most recent med list.  
  
  
  
  
 fluticasone 50 mcg/actuation nasal spray Commonly known as:  FLONASE  
1-2 sprays in each nostril daily  
  
 multivitamin, tx-iron-ca-min 9 mg iron-400 mcg Tab tablet Commonly known as:  THERA-M w/ IRON Take 1 Tab by mouth daily. ORTHO MICRONOR 0.35 mg Tab Generic drug:  norethindrone Take  by mouth. Prescriptions Sent to Pharmacy Refills  
 fluticasone (FLONASE) 50 mcg/actuation nasal spray 3 Si-2 sprays in each nostril daily Class: Normal  
 Pharmacy: [facility] [address] [place], [place] [address] [address] [place] [address] #: [phone] Follow-up Instructions Return in about 4 months (around 2017). Patient Instructions Sinusitis: Care Instructions Your Care Instructions Sinusitis is an infection of the lining of the sinus cavities in your head. Sinusitis often follows a cold. It causes pain and pressure in your head and face. In most cases, sinusitis gets better on its own in 1 to 2 weeks. But some mild symptoms may last for several weeks. Sometimes antibiotics are needed. Follow-up care is a key part of your treatment and safety. Be sure to make and go to all appointments, and call your doctor if you are having problems. It's also a good idea to know your test results and keep a list of the medicines you take. How can you care for yourself at home? · Take an over-the-counter pain medicine, such as acetaminophen (Tylenol), ibuprofen (Advil, Motrin), or naproxen (Aleve). Read and follow all instructions on the label. · If the doctor prescribed antibiotics, take them as directed. Do not stop taking them just because you feel better. You need to take the full course of antibiotics. · Be careful when taking over-the-counter cold or flu medicines and Tylenol at the same time.  Many of these medicines have acetaminophen, which is Tylenol. Read the labels to make sure that you are not taking more than the recommended dose. Too much acetaminophen (Tylenol) can be harmful. · Breathe warm, moist air from a steamy shower, a hot bath, or a sink filled with hot water. Avoid cold, dry air. Using a humidifier in your home may help. Follow the directions for cleaning the machine. · Use saline (saltwater) nasal washes to help keep your nasal passages open and wash out mucus and bacteria. You can buy saline nose drops at a grocery store or [employer]. Or you can make your own at home by adding 1 teaspoon of salt and 1 teaspoon of baking soda to 2 cups of distilled water. If you make your own, fill a bulb syringe with the solution, insert the tip into your nostril, and squeeze gently. Sidonie Marcia your nose. · Put a hot, wet towel or a warm gel pack on your face 3 or 4 times a day for 5 to 10 minutes each time. · Try a decongestant nasal spray like oxymetazoline (Afrin). Do not use it for more than 3 days in a row. Using it for more than 3 days can make your congestion worse. When should you call for help? Call your doctor now or seek immediate medical care if: 
· You have new or worse swelling or redness in your face or around your eyes. · You have a new or higher fever. Watch closely for changes in your health, and be sure to contact your doctor if: 
· You have new or worse facial pain. · The mucus from your nose becomes thicker (like pus) or has new blood in it. · You are not getting better as expected. Where can you learn more? Go to http://varun-maría.info/. Enter Z120 in the search box to learn more about \"Sinusitis: Care Instructions. \" Current as of: July 29, 2016 Content Version: 11.1 © 3807-1056 [employer]. Care instructions adapted under license by [employer] (which disclaims liability or warranty for this information).  If you have questions about a medical condition or this instruction, always ask your healthcare professional. [place] [address] any warranty or liability for your use of this information. Introducing [facility] SERVICES! Flaco Hendricks introduces BuyItRideIt patient portal. Now you can access parts of your medical record, email your doctor's office, and request medication refills online. 1. In your internet browser, go to [email or web address] 2. Click on the First Time User? Click Here link in the Sign In box. You will see the New Member Sign Up page. 3. Enter your BuyItRideIt Access Code exactly as it appears below. You will not need to use this code after youve completed the sign-up process. If you do not sign up before the expiration date, you must request a new code. · BuyItRideIt Access Code: [email or web address] Expires: 4/19/2017 12:06 PM 
 
4. Enter the last four digits of your Social Security Number (xxxx) and Date of Birth (mm/dd/yyyy) as indicated and click Submit. You will be taken to the next sign-up page. 5. Create a BuyItRideIt ID. This will be your BuyItRideIt login ID and cannot be changed, so think of one that is secure and easy to remember. 6. Create a BuyItRideIt password. You can change your password at any time. 7. Enter your Password Reset Question and Answer. This can be used at a later time if you forget your password. 8. Enter your e-mail address. You will receive e-mail notification when new information is available in [address] 9. Click Sign Up. You can now view and download portions of your medical record. 10. Click the Download Summary menu link to download a portable copy of your medical information. If you have questions, please visit the Frequently Asked Questions section of the BuyItRideIt website. Remember, BuyItRideIt is NOT to be used for urgent needs. For medical emergencies, dial 911. Now available from your iPhone and Android! Please provide this summary of care documentation to your next provider. Your primary care clinician is listed as Popeye Lackey. If you have any questions after today's visit, please call [phone]. - - -
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
## 2024-10-18 VITALS
TEMPERATURE: 98 F | HEART RATE: 77 BPM | SYSTOLIC BLOOD PRESSURE: 148 MMHG | DIASTOLIC BLOOD PRESSURE: 90 MMHG | OXYGEN SATURATION: 98 % | RESPIRATION RATE: 18 BRPM

## 2024-10-18 PROCEDURE — 73030 X-RAY EXAM OF SHOULDER: CPT

## 2024-10-18 PROCEDURE — 73030 X-RAY EXAM OF SHOULDER: CPT | Mod: 26,50

## 2024-10-18 PROCEDURE — 99284 EMERGENCY DEPT VISIT MOD MDM: CPT | Mod: 25

## 2024-10-18 RX ORDER — ACETAMINOPHEN 325 MG
650 TABLET ORAL ONCE
Refills: 0 | Status: COMPLETED | OUTPATIENT
Start: 2024-10-18 | End: 2024-10-18

## 2024-10-18 RX ORDER — METOPROLOL TARTRATE 50 MG
0.5 TABLET ORAL
Refills: 0 | DISCHARGE

## 2024-10-18 RX ADMIN — Medication 650 MILLIGRAM(S): at 00:24

## 2024-10-18 RX ADMIN — Medication 650 MILLIGRAM(S): at 00:59

## 2024-10-18 NOTE — ED PROVIDER NOTE - MUSCULOSKELETAL, MLM
Spine appears normal, range of motion is not limited, mild diffuse ttp shoulder joints, able to raise both shoulders above 90deg and then appears in discomfort, no deformities, NVI distally
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
## 2024-10-18 NOTE — ED PROVIDER NOTE - CARE PROVIDER_API CALL
Follow up with your orthopedist if your pain is not improving,   Phone: (   )    -  Fax: (   )    -  Follow Up Time: 4-6 Days    Dimitri Tavera  Orthopaedic Surgery  [address] [facility] [address]  [place] [address]  Phone: [phone]  Fax: [phone]  Follow Up Time: 4-6 Days

## 2024-10-18 NOTE — ED PROVIDER NOTE - NSFOLLOWUPINSTRUCTIONS_ED_ALL_ED_FT
Intimate Partner Violence: [place]  Intimate partner violence, also called domestic violence, domestic abuse, or relationship abuse, is a pattern of behaviors used by one partner to gain or maintain power and control over the other partner. Intimate partner violence can happen to women and men and can happen between people who are or were:  .  Dating.  Living together.  It is important to be honest with yourself. If you are in a relationship that is violent, it is critical to take action.    [place] law    [place] law defines domestic violence as:    "A pattern of coercive tactics which can include physical, psychological, sexual, economic, and emotional abuse, perpetrated by one person against an adult intimate partner, with the goal of establishing and maintaining power and control over the victim."    What are the types of intimate partner violence?  Different types of abuse can occur at the same time within the same relationship.  Physical abuse. This includes rough handling, threats with a weapon, throwing objects, pushing, or hitting.  Emotional and psychological abuse. This includes verbal attacks, rejection, humiliation, intimidation, social isolation, or threats. Abuse may also include limiting contact with family and friends.  Sexual assault. Sexual assault is any unwanted sexual activity that occurs without clear permission (consent) from both people. This includes unwanted touching and sexual harassment.  Economic abuse. This includes controlling money, food, transportation, or other belongings.  Stalking. This involves such things as repeated, unwanted phone calls, e-mails, or text messages, or watching the partner affected by the abuse from a distance.  What are some warning signs of intimate partner violence?  Physical signs    Bruises.  Broken bones.  Burns or cuts.  Physical pain.  Head injury.  Emotional and psychological signs    Symptoms of depression, such as:  Tearfulness.  Hopelessness.  Trouble sleeping.  Suicidal thoughts or behavior.  Low self-esteem.  Symptoms of anxiety, such as:  Nervousness.  Fear of the partner.  Heightened startle response.  Avoiding others.  Flashbacks.  Sexual signs    Bruising, swelling, or bleeding of the genital or rectal area.  Signs of an STI, such as genital sores, warts, or discharge coming from the genital area.  Pain in the genital area.  Unintended pregnancy.  Problems with pregnancy.  Fear of having sex.  What are common behaviors of those affected by intimate partner violence?  Those affected by intimate partner violence may:  Be late to work or other events.  Avoid social activities.  Be isolated or kept from seeing friends or family and may have to let their partner know where they are and who they are with.  Make comments about their partner's temper and make excuses for their partner's behavior.  Engage in high-risk sexual behaviors.  Use drugs or alcohol.  Have unhealthy eating behaviors.  What are common feelings of those affected by intimate partner violence?  Those affected by intimate partner violence may feel that they:  Must be careful not to say or do things that trigger their partner's anger.  Cannot do anything right and deserve to be treated badly.  Overreact to their partner's behavior or temper.  Cannot trust other people or cannot trust their own feelings. They often feel emotionally drained or numb.  Are trapped and in danger if they try to leave.  May have their children taken away by their partner.  Where can you get help?  If you do not feel safe searching for help online at home, use a computer at a [employer] to access the internet. Call 901 if you are in immediate danger or need medical help.    Intimate partner violence hotlines and websites    The [facility] Domestic Violence Hotline.  24-hour phone hotline: 1-190.483.3426 (SAFE) or 1-336.630.3014 (TTY).  Text: "START" to 25571.  Online: [employer]  The [facility] Domestic Violence Hotline. This service is available in multiple languages.  Call: 1-538.846.8636. If you are deaf or hard of hearing, dial 711.  The [facility] Domestic Violence Hotline.  Call: 1-303.334.8353 ([place]). If you are deaf or hard of hearing, dial 311.  The [facility] Sexual Assault Hotline.  24-hour phone hotline: 1-919.898.3588 ([place]).  Online: [email or web address]  Shelters for persons affected by intimate partner violence    If you are a person who has experienced intimate partner violence, there are resources to help you find a temporary place for you and your children to live (shelter). The specific address of these shelters is often not known to the public. A complete list, by [facility], of domestic counseling centers and shelters in [facility] is online: www.[facility].org/find-help/program-directory.html    Police    Report assaults, threats, and stalking to the police.    Counselors and counseling centers      Counseling can help you manage difficult emotions and empower you to plan for your future safety. The topics you discuss with a counselor are private and confidential. Children of those affected by intimate partner violence might also need counseling to manage stress and anxiety.    The court system    You can work with a  or an advocate to get legal protection against an abuser. Protection includes restraining orders and private addresses. Crimes against you, such as assault, can also be prosecuted through the courts.    For legal information, contact [facility]:  1-296.229.6326 ([place])  [facility].org  Follow these instructions at home:  Create a safety plan that includes ways to remain safe while in a relationship with a person who is abusive, while you are planning to leave, or after you leave. This plan may be created by the person affected by intimate partner violence alone or with assistance from the domestic violence hotline staff or local shelter staff. Your safety plan may include:  How to manage emotions.  How to tell friends and family about the abuse.  How to take legal action.  How to create a safe home environment.  How to keep your children safe.  Emergency plans for life-threatening situations.  Get help right away if:  You feel like you are in immediate danger.  You feel like you may hurt yourself or others.  If you ever feel like you may hurt yourself or others, or have thoughts about taking your own life, get help right away. Go to your nearest emergency department or:  Call your local emergency services (911 in the [place]  Call a suicide crisis helpline, such as the National Suicide Prevention Lifeline at 1-169.294.9680 or 616 in the [place] This is open 24 hours a day in the [place]  Text the Crisis Text Line at 309956 (in the [place]  Summary  Intimate partner violence is a pattern of behaviors used by one partner to gain or maintain power and control over the other partner.  It is important to be honest with yourself. If you are in a relationship that is violent, it is critical to take action.  If you have experienced intimate partner violence, there are resources to help you find a temporary place for you and your children to live (shelter).  If you are in a relationship with a person who is abusive, there are resources available to you to find ways to leave the relationship.  Create a safety plan to find ways to remain safe while in a relationship with a person who is abusive, while you are planning to leave, or after you leave.  This information is not intended to replace advice given to you by your health care provider. Make sure you discuss any questions you have with your health care provider.      Shoulder Pain    AMBULATORY CARE:    Shoulder pain is a common problem that can affect your daily activities. Pain can be caused by a problem within your shoulder, such as soreness of a tendon or bursa. A tendon is a cord of tough tissue that connects your muscles to your bones. The bursa is a fluid-filled sac that acts as a cushion between a bone and a tendon. Shoulder pain may also be caused by pain that spreads to your shoulder from another part of your body.  Shoulder Anatomy    Seek care immediately if:    You have severe pain.    You cannot move your arm or shoulder.    You have numbness or tingling in your shoulder or arm.  Contact your healthcare provider if:    Your pain gets worse or does not go away with treatment.    You have trouble moving your arm or shoulder.    You have questions or concerns about your condition or care.  Treatment for shoulder pain may include any of the following:    Acetaminophen decreases pain and fever. It is available without a doctor's order. Ask how much to take and how often to take it. Follow directions. Read the labels of all other medicines you are using to see if they also contain acetaminophen, or ask your doctor or pharmacist. Acetaminophen can cause liver damage if not taken correctly.    NSAIDs, such as ibuprofen, help decrease swelling, pain, and fever. This medicine is available with or without a doctor's order. NSAIDs can cause stomach bleeding or kidney problems in certain people. If you take blood thinner medicine, always ask your healthcare provider if NSAIDs are safe for you. Always read the medicine label and follow directions.    A steroid injection may help decrease pain and swelling.    Surgery may be needed for long-term pain and loss of function.  Manage your symptoms:    Apply ice on your shoulder for 20 to 30 minutes every 2 hours or as directed. Use an ice pack, or put crushed ice in a plastic bag. Cover it with a towel before you apply it to your shoulder. Ice helps prevent tissue damage and decreases swelling and pain.    Apply heat if ice does not help your symptoms. Apply heat on your shoulder for 20 to 30 minutes every 2 hours for as many days as directed. Heat helps decrease pain and muscle spasms.    Limit activities as directed. Try to avoid repeated overhead movements.    Go to physical or occupational therapy as directed. A physical therapist teaches you exercises to help improve movement and strength, and to decrease pain. An occupational therapist teaches you skills to help with your daily activities.  Prevent shoulder pain:    Maintain a good range of motion in your shoulder. Ask your healthcare provider which exercises you should do on a regular basis after you have healed.    Stretch and strengthen your shoulder. Use proper technique during exercises and sports.  Follow up with your healthcare provider or orthopedist as directed: Write down your questions so you remember to ask them during your visits.

## 2024-10-18 NOTE — ED PROVIDER NOTE - CARE PROVIDERS DIRECT ADDRESSES
,DirectAddress_Unknown,[email or web address]

## 2024-10-18 NOTE — ED PROVIDER NOTE - PATIENT PORTAL LINK FT
You can access the FollowMyHealth Patient Portal offered by [facility] by registering at the following website: [email or web address]. By joining [employer]’s FollowMyHealth portal, you will also be able to view your health information using other applications (apps) compatible with our system.

## 2024-10-18 NOTE — ED PROVIDER NOTE - PROVIDER TOKENS
FREE:[LAST:[Follow up with your orthopedist if your pain is not improving],PHONE:[(   )    -],FAX:[(   )    -],FOLLOWUP:[4-6 Days]],PROVIDER:[TOKEN:[[id number]],FOLLOWUP:[4-6 Days]]

## 2024-10-25 RX ORDER — DICLOFENAC SODIUM 50 MG/1
50 TABLET, DELAYED RELEASE ORAL
Qty: 20 | Refills: 0 | Status: ACTIVE | COMMUNITY
Start: 2024-10-25 | End: 1900-01-01

## 2024-10-25 RX ORDER — ALBUTEROL SULFATE 90 UG/1
108 (90 BASE) AEROSOL, METERED RESPIRATORY (INHALATION)
Qty: 1 | Refills: 1 | Status: ACTIVE | COMMUNITY
Start: 2024-10-25 | End: 1900-01-01

## 2024-11-17 NOTE — ED PROVIDER NOTE - CHILD ABUSE FACILITY
No care due was identified.  [facility] Embedded Care Due Messages. Reference number: [id number].   11/17/2024 1:28:28 PM CST   [facility]

## 2024-11-19 PROBLEM — I10 ESSENTIAL (PRIMARY) HYPERTENSION: Chronic | Status: ACTIVE | Noted: 2024-10-17

## 2024-11-19 PROBLEM — E03.9 HYPOTHYROIDISM, UNSPECIFIED: Chronic | Status: ACTIVE | Noted: 2024-10-17

## 2024-12-17 ENCOUNTER — APPOINTMENT (OUTPATIENT)
Dept: UROLOGY | Facility: CLINIC | Age: 54
End: 2024-12-17
Payer: MEDICARE

## 2024-12-17 VITALS — SYSTOLIC BLOOD PRESSURE: 136 MMHG | DIASTOLIC BLOOD PRESSURE: 84 MMHG

## 2024-12-17 DIAGNOSIS — C64.2 MALIGNANT NEOPLASM OF LEFT KIDNEY, EXCEPT RENAL PELVIS: ICD-10-CM

## 2024-12-17 PROCEDURE — 99214 OFFICE O/P EST MOD 30 MIN: CPT

## 2025-01-22 ENCOUNTER — APPOINTMENT (OUTPATIENT)
Dept: FAMILY MEDICINE | Facility: CLINIC | Age: 55
End: 2025-01-22

## 2025-04-01 ENCOUNTER — OUTPATIENT (OUTPATIENT)
Dept: OUTPATIENT SERVICES | Facility: HOSPITAL | Age: 55
LOS: 1 days | End: 2025-04-01
Payer: COMMERCIAL

## 2025-04-01 ENCOUNTER — RESULT REVIEW (OUTPATIENT)
Age: 55
End: 2025-04-01

## 2025-04-01 ENCOUNTER — APPOINTMENT (OUTPATIENT)
Dept: ULTRASOUND IMAGING | Facility: CLINIC | Age: 55
End: 2025-04-01

## 2025-04-01 DIAGNOSIS — M17.10 UNILATERAL PRIMARY OSTEOARTHRITIS, UNSPECIFIED KNEE: Chronic | ICD-10-CM

## 2025-04-01 DIAGNOSIS — M12.9 ARTHROPATHY, UNSPECIFIED: Chronic | ICD-10-CM

## 2025-04-01 DIAGNOSIS — C64.2 MALIGNANT NEOPLASM OF LEFT KIDNEY, EXCEPT RENAL PELVIS: ICD-10-CM

## 2025-04-01 DIAGNOSIS — Z98.1 ARTHRODESIS STATUS: Chronic | ICD-10-CM

## 2025-04-01 DIAGNOSIS — R06.83 SNORING: Chronic | ICD-10-CM

## 2025-04-01 DIAGNOSIS — Z00.8 ENCOUNTER FOR OTHER GENERAL EXAMINATION: ICD-10-CM

## 2025-04-01 PROCEDURE — 76770 US EXAM ABDO BACK WALL COMP: CPT | Mod: 26

## 2025-04-01 PROCEDURE — 76770 US EXAM ABDO BACK WALL COMP: CPT

## 2025-04-04 ENCOUNTER — NON-APPOINTMENT (OUTPATIENT)
Age: 55
End: 2025-04-04

## 2025-04-04 ENCOUNTER — APPOINTMENT (OUTPATIENT)
Dept: PAIN MANAGEMENT | Facility: CLINIC | Age: 55
End: 2025-04-04

## 2025-04-04 ENCOUNTER — LABORATORY RESULT (OUTPATIENT)
Age: 55
End: 2025-04-04

## 2025-04-04 VITALS
DIASTOLIC BLOOD PRESSURE: 84 MMHG | HEIGHT: 70 IN | BODY MASS INDEX: 29.06 KG/M2 | SYSTOLIC BLOOD PRESSURE: 133 MMHG | HEART RATE: 66 BPM | WEIGHT: 203 LBS

## 2025-04-04 PROCEDURE — 99214 OFFICE O/P EST MOD 30 MIN: CPT

## 2025-04-08 ENCOUNTER — APPOINTMENT (OUTPATIENT)
Dept: UROLOGY | Facility: CLINIC | Age: 55
End: 2025-04-08

## 2025-04-11 ENCOUNTER — RX RENEWAL (OUTPATIENT)
Age: 55
End: 2025-04-11

## 2025-04-11 LAB
AMPHET UR-MCNC: NEGATIVE NG/ML
BARBITURATES UR-MCNC: NEGATIVE NG/ML
BENZODIAZ UR-MCNC: NEGATIVE NG/ML
COCAINE METAB.OTHER UR-MCNC: NEGATIVE NG/ML
CREATININE, URINE: 103.4 MG/DL
FENTANYL, URINE: NEGATIVE NG/ML
METHADONE SCREEN, UR: NEGATIVE NG/ML
OPIATES UR-MCNC: NEGATIVE NG/ML
OXYCODONE/OXYMORPHONE, URINE: NEGATIVE NG/ML
PCP UR-MCNC: NEGATIVE NG/ML
PH, URINE: 5.5
THC UR QL: NORMAL NG/ML

## 2025-04-16 ENCOUNTER — APPOINTMENT (OUTPATIENT)
Dept: UROLOGY | Facility: CLINIC | Age: 55
End: 2025-04-16
Payer: MEDICARE

## 2025-04-16 DIAGNOSIS — Z12.5 ENCOUNTER FOR SCREENING FOR MALIGNANT NEOPLASM OF PROSTATE: ICD-10-CM

## 2025-04-16 DIAGNOSIS — N28.1 CYST OF KIDNEY, ACQUIRED: ICD-10-CM

## 2025-04-16 PROCEDURE — 99214 OFFICE O/P EST MOD 30 MIN: CPT

## 2025-04-17 LAB — PSA SERPL-MCNC: 1.73 NG/ML

## 2025-04-20 NOTE — ED PROVIDER NOTE - DIAGNOSIS COUNSELING, MDM
show conducted a detailed discussion... I had a detailed discussion with the patient and/or guardian regarding the historical points, exam findings, and any diagnostic results supporting the discharge/admit diagnosis.

## 2025-04-21 ENCOUNTER — LABORATORY RESULT (OUTPATIENT)
Age: 55
End: 2025-04-21

## 2025-04-21 ENCOUNTER — APPOINTMENT (OUTPATIENT)
Dept: FAMILY MEDICINE | Facility: CLINIC | Age: 55
End: 2025-04-21
Payer: MEDICARE

## 2025-04-21 VITALS
RESPIRATION RATE: 16 BRPM | DIASTOLIC BLOOD PRESSURE: 76 MMHG | HEIGHT: 70 IN | TEMPERATURE: 97.5 F | HEART RATE: 67 BPM | BODY MASS INDEX: 30.06 KG/M2 | SYSTOLIC BLOOD PRESSURE: 129 MMHG | OXYGEN SATURATION: 99 % | WEIGHT: 210 LBS

## 2025-04-21 DIAGNOSIS — Z87.898 PERSONAL HISTORY OF OTHER SPECIFIED CONDITIONS: ICD-10-CM

## 2025-04-21 DIAGNOSIS — M62.830 MUSCLE SPASM OF BACK: ICD-10-CM

## 2025-04-21 DIAGNOSIS — Z86.19 PERSONAL HISTORY OF OTHER INFECTIOUS AND PARASITIC DISEASES: ICD-10-CM

## 2025-04-21 DIAGNOSIS — C64.2 MALIGNANT NEOPLASM OF LEFT KIDNEY, EXCEPT RENAL PELVIS: ICD-10-CM

## 2025-04-21 DIAGNOSIS — Z86.69 PERSONAL HISTORY OF OTHER DISEASES OF THE NERVOUS SYSTEM AND SENSE ORGANS: ICD-10-CM

## 2025-04-21 DIAGNOSIS — R41.82 ALTERED MENTAL STATUS, UNSPECIFIED: ICD-10-CM

## 2025-04-21 DIAGNOSIS — E03.9 HYPOTHYROIDISM, UNSPECIFIED: ICD-10-CM

## 2025-04-21 DIAGNOSIS — E55.9 VITAMIN D DEFICIENCY, UNSPECIFIED: ICD-10-CM

## 2025-04-21 DIAGNOSIS — M62.838 OTHER MUSCLE SPASM: ICD-10-CM

## 2025-04-21 DIAGNOSIS — F41.9 ANXIETY DISORDER, UNSPECIFIED: ICD-10-CM

## 2025-04-21 DIAGNOSIS — J45.909 UNSPECIFIED ASTHMA, UNCOMPLICATED: ICD-10-CM

## 2025-04-21 DIAGNOSIS — I10 ESSENTIAL (PRIMARY) HYPERTENSION: ICD-10-CM

## 2025-04-21 PROCEDURE — 81003 URINALYSIS AUTO W/O SCOPE: CPT | Mod: QW

## 2025-04-21 PROCEDURE — 36415 COLL VENOUS BLD VENIPUNCTURE: CPT

## 2025-04-21 PROCEDURE — 99215 OFFICE O/P EST HI 40 MIN: CPT

## 2025-04-21 PROCEDURE — 99401 PREV MED CNSL INDIV APPRX 15: CPT

## 2025-04-21 RX ORDER — CHOLECALCIFEROL (VITAMIN D3) 1250 MCG
1.25 MG CAPSULE ORAL
Qty: 12 | Refills: 0 | Status: ACTIVE | COMMUNITY
Start: 2025-04-21 | End: 1900-01-01

## 2025-04-24 ENCOUNTER — NON-APPOINTMENT (OUTPATIENT)
Age: 55
End: 2025-04-24

## 2025-04-24 LAB
25(OH)D3 SERPL-MCNC: 24.4 NG/ML
ALBUMIN SERPL ELPH-MCNC: 4.1 G/DL
ALP BLD-CCNC: 88 U/L
ALT SERPL-CCNC: 26 U/L
ANION GAP SERPL CALC-SCNC: 15 MMOL/L
APPEARANCE: CLEAR
AST SERPL-CCNC: 20 U/L
BASOPHILS # BLD AUTO: 0.05 K/UL
BASOPHILS NFR BLD AUTO: 0.7 %
BILIRUB SERPL-MCNC: 0.6 MG/DL
BILIRUBIN URINE: NEGATIVE
BLOOD URINE: NEGATIVE
BUN SERPL-MCNC: 16 MG/DL
CALCIUM SERPL-MCNC: 9.2 MG/DL
CHLORIDE SERPL-SCNC: 111 MMOL/L
CHOLEST SERPL-MCNC: 170 MG/DL
CO2 SERPL-SCNC: 19 MMOL/L
COLOR: YELLOW
CREAT SERPL-MCNC: 0.88 MG/DL
EGFRCR SERPLBLD CKD-EPI 2021: 102 ML/MIN/1.73M2
EOSINOPHIL # BLD AUTO: 0.07 K/UL
EOSINOPHIL NFR BLD AUTO: 1 %
ESTIMATED AVERAGE GLUCOSE: 117 MG/DL
GLUCOSE QUALITATIVE U: NEGATIVE MG/DL
GLUCOSE SERPL-MCNC: 112 MG/DL
HBA1C MFR BLD HPLC: 5.7 %
HCT VFR BLD CALC: 44.3 %
HDLC SERPL-MCNC: 66 MG/DL
HGB BLD-MCNC: 14.3 G/DL
IMM GRANULOCYTES NFR BLD AUTO: 0.7 %
KETONES URINE: NEGATIVE MG/DL
LDLC SERPL-MCNC: 89 MG/DL
LEUKOCYTE ESTERASE URINE: NEGATIVE
LYMPHOCYTES # BLD AUTO: 1.22 K/UL
LYMPHOCYTES NFR BLD AUTO: 16.6 %
MAGNESIUM SERPL-MCNC: 2.1 MG/DL
MAN DIFF?: NORMAL
MCHC RBC-ENTMCNC: 29.4 PG
MCHC RBC-ENTMCNC: 32.3 G/DL
MCV RBC AUTO: 91.2 FL
MONOCYTES # BLD AUTO: 0.47 K/UL
MONOCYTES NFR BLD AUTO: 6.4 %
NEUTROPHILS # BLD AUTO: 5.5 K/UL
NEUTROPHILS NFR BLD AUTO: 74.6 %
NITRITE URINE: NEGATIVE
NONHDLC SERPL-MCNC: 105 MG/DL
PH URINE: 5.5
PHOSPHATE SERPL-MCNC: 2.7 MG/DL
PLATELET # BLD AUTO: NORMAL K/UL
POTASSIUM SERPL-SCNC: 4.3 MMOL/L
PROT SERPL-MCNC: 6.7 G/DL
PROTEIN URINE: NORMAL MG/DL
PSA SERPL-MCNC: 1.81 NG/ML
RBC # BLD: 4.86 M/UL
RBC # FLD: 12.9 %
SODIUM SERPL-SCNC: 145 MMOL/L
SPECIFIC GRAVITY URINE: 1.03
T3FREE SERPL-MCNC: 2.78 PG/ML
T4 FREE SERPL-MCNC: 1.7 NG/DL
THYROGLOB AB SERPL-ACNC: 15.9 IU/ML
THYROPEROXIDASE AB SERPL IA-ACNC: 9.5 IU/ML
TRIGL SERPL-MCNC: 84 MG/DL
TSH SERPL-ACNC: 0.47 UIU/ML
URATE SERPL-MCNC: 5.5 MG/DL
UROBILINOGEN URINE: 0.2 MG/DL
WBC # FLD AUTO: 7.36 K/UL

## 2025-04-25 NOTE — ED PROVIDER NOTE - PROGRESS NOTE DETAILS
Nursing
Dorian Owen, PGY3 pt ct and us shows no blood clots at this time. pt notes his symptoms have resolved and he feels at his baseline. Pt was recommneded admission for chest pain and hx of possible PE from OSH. Wife and  at bedside noting they do not want to stay in the hospital as he feels at his baseline and would like to follow up with his pulmonologist outpt. return precautions discussed. discussed that we would prefer pt to stay in hospital as we cannot stop medications from OSH in the ED without further monitoring and workup. pt understands and will follow with his doctor.

## 2025-05-29 NOTE — ED PROVIDER NOTE - CARDIOVASCULAR NEGATIVE STATEMENT, MLM
FYI patient was referred to [facility] for PT/OT upon hospital discharge however declined services when contacted for scheduling. Please ensure patient has a hospital follow up scheduled as they will not be monitored by home care.  
no chest pain

## 2025-06-18 ENCOUNTER — EMERGENCY (EMERGENCY)
Facility: HOSPITAL | Age: 55
LOS: 1 days | End: 2025-06-18
Attending: EMERGENCY MEDICINE | Admitting: EMERGENCY MEDICINE
Payer: MEDICARE

## 2025-06-18 VITALS
RESPIRATION RATE: 18 BRPM | OXYGEN SATURATION: 98 % | SYSTOLIC BLOOD PRESSURE: 145 MMHG | TEMPERATURE: 98 F | HEIGHT: 70 IN | HEART RATE: 88 BPM | DIASTOLIC BLOOD PRESSURE: 90 MMHG | WEIGHT: 207.23 LBS

## 2025-06-18 DIAGNOSIS — Z98.1 ARTHRODESIS STATUS: Chronic | ICD-10-CM

## 2025-06-18 DIAGNOSIS — M17.10 UNILATERAL PRIMARY OSTEOARTHRITIS, UNSPECIFIED KNEE: Chronic | ICD-10-CM

## 2025-06-18 DIAGNOSIS — R06.83 SNORING: Chronic | ICD-10-CM

## 2025-06-18 DIAGNOSIS — M12.9 ARTHROPATHY, UNSPECIFIED: Chronic | ICD-10-CM

## 2025-06-18 LAB
ALBUMIN SERPL ELPH-MCNC: 3.3 G/DL — SIGNIFICANT CHANGE UP (ref 3.3–5)
ALP SERPL-CCNC: 82 U/L — SIGNIFICANT CHANGE UP (ref 30–120)
ALT FLD-CCNC: 46 U/L — SIGNIFICANT CHANGE UP (ref 10–60)
ANION GAP SERPL CALC-SCNC: 8 MMOL/L — SIGNIFICANT CHANGE UP (ref 5–17)
APTT BLD: 18.8 SEC — LOW (ref 26.1–36.8)
AST SERPL-CCNC: 25 U/L — SIGNIFICANT CHANGE UP (ref 10–40)
BASOPHILS # BLD AUTO: 0.05 K/UL — SIGNIFICANT CHANGE UP (ref 0–0.2)
BASOPHILS NFR BLD AUTO: 0.5 % — SIGNIFICANT CHANGE UP (ref 0–2)
BILIRUB SERPL-MCNC: 1 MG/DL — SIGNIFICANT CHANGE UP (ref 0.2–1.2)
BUN SERPL-MCNC: 21 MG/DL — SIGNIFICANT CHANGE UP (ref 7–23)
CALCIUM SERPL-MCNC: 8.7 MG/DL — SIGNIFICANT CHANGE UP (ref 8.4–10.5)
CHLORIDE SERPL-SCNC: 105 MMOL/L — SIGNIFICANT CHANGE UP (ref 96–108)
CO2 SERPL-SCNC: 29 MMOL/L — SIGNIFICANT CHANGE UP (ref 22–31)
CREAT SERPL-MCNC: 1.06 MG/DL — SIGNIFICANT CHANGE UP (ref 0.5–1.3)
EGFR: 83 ML/MIN/1.73M2 — SIGNIFICANT CHANGE UP
EGFR: 83 ML/MIN/1.73M2 — SIGNIFICANT CHANGE UP
EOSINOPHIL # BLD AUTO: 0.17 K/UL — SIGNIFICANT CHANGE UP (ref 0–0.5)
EOSINOPHIL NFR BLD AUTO: 1.5 % — SIGNIFICANT CHANGE UP (ref 0–6)
GLUCOSE SERPL-MCNC: 88 MG/DL — SIGNIFICANT CHANGE UP (ref 70–99)
HCT VFR BLD CALC: 40 % — SIGNIFICANT CHANGE UP (ref 39–50)
HGB BLD-MCNC: 12.9 G/DL — LOW (ref 13–17)
IMM GRANULOCYTES # BLD AUTO: 0.03 K/UL — SIGNIFICANT CHANGE UP (ref 0–0.07)
IMM GRANULOCYTES NFR BLD AUTO: 0.3 % — SIGNIFICANT CHANGE UP (ref 0–0.9)
INR BLD: 0.97 RATIO — SIGNIFICANT CHANGE UP (ref 0.85–1.16)
LYMPHOCYTES # BLD AUTO: 2.15 K/UL — SIGNIFICANT CHANGE UP (ref 1–3.3)
LYMPHOCYTES NFR BLD AUTO: 19.4 % — SIGNIFICANT CHANGE UP (ref 13–44)
MCHC RBC-ENTMCNC: 28.8 PG — SIGNIFICANT CHANGE UP (ref 27–34)
MCHC RBC-ENTMCNC: 32.3 G/DL — SIGNIFICANT CHANGE UP (ref 32–36)
MCV RBC AUTO: 89.3 FL — SIGNIFICANT CHANGE UP (ref 80–100)
MONOCYTES # BLD AUTO: 1.18 K/UL — HIGH (ref 0–0.9)
MONOCYTES NFR BLD AUTO: 10.7 % — SIGNIFICANT CHANGE UP (ref 2–14)
NEUTROPHILS # BLD AUTO: 7.49 K/UL — HIGH (ref 1.8–7.4)
NEUTROPHILS NFR BLD AUTO: 67.6 % — SIGNIFICANT CHANGE UP (ref 43–77)
NRBC # BLD AUTO: 0 K/UL — SIGNIFICANT CHANGE UP (ref 0–0)
NRBC # FLD: 0 K/UL — SIGNIFICANT CHANGE UP (ref 0–0)
NRBC BLD AUTO-RTO: 0 /100 WBCS — SIGNIFICANT CHANGE UP (ref 0–0)
PLATELET # BLD AUTO: 263 K/UL — SIGNIFICANT CHANGE UP (ref 150–400)
PMV BLD: 10.1 FL — SIGNIFICANT CHANGE UP (ref 7–13)
POTASSIUM SERPL-MCNC: 4.7 MMOL/L — SIGNIFICANT CHANGE UP (ref 3.5–5.3)
POTASSIUM SERPL-SCNC: 4.7 MMOL/L — SIGNIFICANT CHANGE UP (ref 3.5–5.3)
PROT SERPL-MCNC: 6.6 G/DL — SIGNIFICANT CHANGE UP (ref 6–8.3)
PROTHROM AB SERPL-ACNC: 11.2 SEC — SIGNIFICANT CHANGE UP (ref 9.9–13.4)
RBC # BLD: 4.48 M/UL — SIGNIFICANT CHANGE UP (ref 4.2–5.8)
RBC # FLD: 13 % — SIGNIFICANT CHANGE UP (ref 10.3–14.5)
SODIUM SERPL-SCNC: 142 MMOL/L — SIGNIFICANT CHANGE UP (ref 135–145)
TROPONIN I, HIGH SENSITIVITY RESULT: 5.3 NG/L — SIGNIFICANT CHANGE UP
WBC # BLD: 11.07 K/UL — HIGH (ref 3.8–10.5)
WBC # FLD AUTO: 11.07 K/UL — HIGH (ref 3.8–10.5)

## 2025-06-18 PROCEDURE — 99285 EMERGENCY DEPT VISIT HI MDM: CPT

## 2025-06-18 PROCEDURE — 71045 X-RAY EXAM CHEST 1 VIEW: CPT | Mod: 26

## 2025-06-18 PROCEDURE — 93010 ELECTROCARDIOGRAM REPORT: CPT

## 2025-06-18 RX ORDER — ASPIRIN 325 MG
324 TABLET ORAL ONCE
Refills: 0 | Status: COMPLETED | OUTPATIENT
Start: 2025-06-18 | End: 2025-06-18

## 2025-06-18 RX ADMIN — Medication 324 MILLIGRAM(S): at 23:44

## 2025-06-19 VITALS
DIASTOLIC BLOOD PRESSURE: 61 MMHG | SYSTOLIC BLOOD PRESSURE: 111 MMHG | HEART RATE: 60 BPM | RESPIRATION RATE: 14 BRPM | TEMPERATURE: 98 F | OXYGEN SATURATION: 98 %

## 2025-06-19 LAB — TROPONIN I, HIGH SENSITIVITY RESULT: 4.4 NG/L — SIGNIFICANT CHANGE UP

## 2025-06-19 PROCEDURE — 99285 EMERGENCY DEPT VISIT HI MDM: CPT | Mod: 25

## 2025-06-19 PROCEDURE — 80053 COMPREHEN METABOLIC PANEL: CPT

## 2025-06-19 PROCEDURE — 71275 CT ANGIOGRAPHY CHEST: CPT | Mod: 26

## 2025-06-19 PROCEDURE — 71275 CT ANGIOGRAPHY CHEST: CPT

## 2025-06-19 PROCEDURE — 93005 ELECTROCARDIOGRAM TRACING: CPT

## 2025-06-19 PROCEDURE — 85730 THROMBOPLASTIN TIME PARTIAL: CPT

## 2025-06-19 PROCEDURE — 96375 TX/PRO/DX INJ NEW DRUG ADDON: CPT | Mod: XU

## 2025-06-19 PROCEDURE — 74177 CT ABD & PELVIS W/CONTRAST: CPT

## 2025-06-19 PROCEDURE — 84484 ASSAY OF TROPONIN QUANT: CPT

## 2025-06-19 PROCEDURE — 85025 COMPLETE CBC W/AUTO DIFF WBC: CPT

## 2025-06-19 PROCEDURE — 85610 PROTHROMBIN TIME: CPT

## 2025-06-19 PROCEDURE — 93010 ELECTROCARDIOGRAM REPORT: CPT

## 2025-06-19 PROCEDURE — 71045 X-RAY EXAM CHEST 1 VIEW: CPT

## 2025-06-19 PROCEDURE — 96374 THER/PROPH/DIAG INJ IV PUSH: CPT | Mod: XU

## 2025-06-19 PROCEDURE — 74177 CT ABD & PELVIS W/CONTRAST: CPT | Mod: 26

## 2025-06-19 PROCEDURE — 36415 COLL VENOUS BLD VENIPUNCTURE: CPT

## 2025-06-19 RX ORDER — ONDANSETRON HCL/PF 4 MG/2 ML
4 VIAL (ML) INJECTION ONCE
Refills: 0 | Status: COMPLETED | OUTPATIENT
Start: 2025-06-19 | End: 2025-06-19

## 2025-06-19 RX ORDER — KETOROLAC TROMETHAMINE 30 MG/ML
30 INJECTION, SOLUTION INTRAMUSCULAR; INTRAVENOUS ONCE
Refills: 0 | Status: DISCONTINUED | OUTPATIENT
Start: 2025-06-19 | End: 2025-06-19

## 2025-06-19 RX ADMIN — Medication 1000 MILLILITER(S): at 02:17

## 2025-06-19 RX ADMIN — KETOROLAC TROMETHAMINE 30 MILLIGRAM(S): 30 INJECTION, SOLUTION INTRAMUSCULAR; INTRAVENOUS at 01:42

## 2025-06-19 RX ADMIN — Medication 4 MILLIGRAM(S): at 02:17

## 2025-06-19 RX ADMIN — KETOROLAC TROMETHAMINE 30 MILLIGRAM(S): 30 INJECTION, SOLUTION INTRAMUSCULAR; INTRAVENOUS at 01:37

## 2025-07-07 ENCOUNTER — RX RENEWAL (OUTPATIENT)
Age: 55
End: 2025-07-07

## 2025-07-10 ENCOUNTER — RX RENEWAL (OUTPATIENT)
Age: 55
End: 2025-07-10

## 2025-07-12 ENCOUNTER — RX RENEWAL (OUTPATIENT)
Age: 55
End: 2025-07-12